# Patient Record
Sex: FEMALE | Race: WHITE | NOT HISPANIC OR LATINO | Employment: PART TIME | URBAN - NONMETROPOLITAN AREA
[De-identification: names, ages, dates, MRNs, and addresses within clinical notes are randomized per-mention and may not be internally consistent; named-entity substitution may affect disease eponyms.]

---

## 2017-02-06 ENCOUNTER — TELEPHONE (OUTPATIENT)
Dept: FAMILY MEDICINE | Facility: CLINIC | Age: 57
End: 2017-02-06

## 2017-02-06 NOTE — TELEPHONE ENCOUNTER
2/6/2017    Call Regarding Preventive Health Screening Colonoscopy, Mammogram, Cervical/PAP and LDL    Attempt 1    Message on voicemail     Comments:         Outreach   Yanet Martin

## 2017-02-13 NOTE — TELEPHONE ENCOUNTER
2/13/2017    Call Regarding Preventive Health Screening Colonoscopy, Mammogram and Cervical/PAP and LDL    Attempt 2    Message on voicemail     Comments:           Outreach   chano

## 2017-02-20 NOTE — TELEPHONE ENCOUNTER
2/20/2017    Call Regarding Preventive Health Screening Colonoscopy, Mammogram and Cervical/PAP    Attempt 3    Message on voicemail     Comments:           Outreach   chano

## 2017-05-05 ENCOUNTER — OFFICE VISIT (OUTPATIENT)
Dept: FAMILY MEDICINE | Facility: CLINIC | Age: 57
End: 2017-05-05
Payer: COMMERCIAL

## 2017-05-05 VITALS — TEMPERATURE: 99.3 F | HEART RATE: 95 BPM | OXYGEN SATURATION: 93 %

## 2017-05-05 DIAGNOSIS — J44.9 CHRONIC OBSTRUCTIVE PULMONARY DISEASE, UNSPECIFIED COPD TYPE (H): Primary | ICD-10-CM

## 2017-05-05 DIAGNOSIS — L03.011 CELLULITIS OF FINGER OF RIGHT HAND: ICD-10-CM

## 2017-05-05 PROCEDURE — 99214 OFFICE O/P EST MOD 30 MIN: CPT | Performed by: NURSE PRACTITIONER

## 2017-05-05 RX ORDER — ALBUTEROL SULFATE 90 UG/1
1-2 AEROSOL, METERED RESPIRATORY (INHALATION) EVERY 4 HOURS PRN
Qty: 2 INHALER | Refills: 3 | Status: SHIPPED | OUTPATIENT
Start: 2017-05-05 | End: 2017-09-27

## 2017-05-05 RX ORDER — CEPHALEXIN 500 MG/1
500 CAPSULE ORAL 2 TIMES DAILY
Qty: 20 CAPSULE | Refills: 0 | Status: SHIPPED | OUTPATIENT
Start: 2017-05-05 | End: 2017-09-27

## 2017-05-05 NOTE — MR AVS SNAPSHOT
After Visit Summary   5/5/2017    Astrid Santoro    MRN: [id number]           Patient Information     Date Of Birth          1960        Visit Information        Provider Department      5/5/2017 2:40 PM Jacque Sherwood APRN [facility]        Today's Diagnoses     Chronic obstructive pulmonary disease, unspecified COPD type (H)    -  1    Cellulitis of finger of right hand          Care Instructions    Complete full course of antibiotics even if you notice improvement in the finger    Soak your finger in warm water twice a day.     No need to put a band aid on unless it opens up and drains    If you develop fever, increased swelling, warmth or redness go to the urgent care of ER.        Your clinic record indicates that you are due for:   Mammogram, Pap and physical exam, Colonoscopy or yearly stool blood testing (FIT) and fasting labs  Need to be fasting for 10 hrs so just coming in before eating breakfast is the easiest.   You are due for your mammogram, please schedule: [place] FallsVnyp=[phone], [place]Jbzv=[phone], [place] Clemente[place]dxetx=[phone], [facility] [phone] or Snicojz=[phone]              Follow-ups after your visit        Who to contact     If you have questions or need follow up information about today's clinic visit or your schedule please contact [facility] directly at [phone].  Normal or non-critical lab and imaging results will be communicated to you by MyChart, letter or phone within 4 business days after the clinic has received the results. If you do not hear from us within 7 days, please contact the clinic through MyChart or phone. If you have a critical or abnormal lab result, we will notify you by phone as soon as possible.  Submit refill requests through [employer] or call your pharmacy and they will forward the refill request to us. Please allow 3 business days for your refill to be completed.          Additional Information  "About Your Visit        [employer] Information     [employer] lets you send messages to your doctor, view your test results, renew your prescriptions, schedule appointments and more. To sign up, go to [email or web address] . Click on \"Log in\" on the left side of the screen, which will take you to the Welcome page. Then click on \"Sign up Now\" on the right side of the page.     You will be asked to enter the access code listed below, as well as some personal information. Please follow the directions to create your username and password.     Your access code is: [email or web address]  Expires: 8/3/2017  3:21 PM     Your access code will  in 90 days. If you need help or a new code, please call your [facility] clinic or [phone].        Care EveryWhere ID     This is your Care EveryWhere ID. This could be used by other organizations to access your [facility] medical records  [id number]        Your Vitals Were     Pulse Temperature Pulse Oximetry             95 99.3  F (37.4  C) (Tympanic) 93%          Blood Pressure from Last 3 Encounters:   16 114/60   12/11/15 108/60   07/10/15 118/60    Weight from Last 3 Encounters:   16 169 lb (76.7 kg)   16 169 lb 4.8 oz (76.8 kg)   12/11/15 173 lb (78.5 kg)              Today, you had the following     No orders found for display         Today's Medication Changes          These changes are accurate as of: 17  3:21 PM.  If you have any questions, ask your nurse or doctor.               Start taking these medicines.        Dose/Directions    cephALEXin 500 MG capsule   Commonly known as:  KEFLEX   Used for:  Cellulitis of finger of right hand        Dose:  500 mg   Take 1 capsule (500 mg) by mouth 2 times daily   Quantity:  20 capsule   Refills:  0            Where to get your medicines      These medications were sent to [facility] [address] [address]     Phone:  [phone]     albuterol 108 (90 " BASE) MCG/ACT Inhaler    cephALEXin 500 MG capsule    mometasone-formoterol 200-5 MCG/ACT oral inhaler    tiotropium 2.5 MCG/ACT inhalation aerosol                Primary Care Provider Office Phone # Fax #    MARISSA Alvarado CHRIS [phone]       [facility] [address] [facility] [address]        Thank you!     Thank you for choosing [facility]  for your care. Our goal is always to provide you with excellent care. Hearing back from our patients is one way we can continue to improve our services. Please take a few minutes to complete the written survey that you may receive in the mail after your visit with us. Thank you!             Your Updated Medication List - Protect others around you: Learn how to safely use, store and throw away your medicines at www.disposemymeds.org.          This list is accurate as of: 5/5/17  3:21 PM.  Always use your most recent med list.                   Brand Name Dispense Instructions for use    albuterol 108 (90 BASE) MCG/ACT Inhaler    PROAIR HFA/PROVENTIL HFA/VENTOLIN HFA    2 Inhaler    Inhale 1-2 puffs into the lungs every 4 hours as needed for shortness of breath / dyspnea       cephALEXin 500 MG capsule    KEFLEX    20 capsule    Take 1 capsule (500 mg) by mouth 2 times daily       IBU-200 PO      as directed as needed       mometasone-formoterol 200-5 MCG/ACT oral inhaler    DULERA    39 g    Inhale 2 puffs into the lungs 2 times daily       tiotropium 2.5 MCG/ACT inhalation aerosol    SPIRIVA RESPIMAT    12 g    Inhale 2 puffs into the lungs daily       TYLENOL EXTRA STRENGTH PO      as directed

## 2017-05-05 NOTE — PATIENT INSTRUCTIONS
Complete full course of antibiotics even if you notice improvement in the finger    Soak your finger in warm water twice a day.     No need to put a band aid on unless it opens up and drains    If you develop fever, increased swelling, warmth or redness go to the urgent care of ER.        Your clinic record indicates that you are due for:  Mammogram, Pap and physical exam, Colonoscopy or yearly stool blood testing (FIT) and fasting labs  Need to be fasting for 10 hrs so just coming in before eating breakfast is the easiest.   You are due for your mammogram, please schedule: [place]Rlsj=[phone], [place] ValleyWavz=[phone], MarionVmmtvf=[phone], [facility] [phone] or Dxzwemx=[phone]

## 2017-05-05 NOTE — PROGRESS NOTES
SUBJECTIVE:                                                    Astrid Santoro is a 56 year old female who presents to clinic today for the following health issues:    COPD Follow-Up    Symptoms are currently: stable    Current fatigue or dyspnea with ambulation: none    Shortness of breath: stable    Increased or change in Cough/Sputum: No    Fever(s): No    Baseline ambulation without stopping to rest 1 miles. Able to walk up 2 flights of stairs without stopping to rest.    Any ER/UC or hospital admissions since your last visit? No     History   Smoking Status     Current Every Day Smoker     Packs/day: 0.50     Years: 36.00     Types: Cigarettes   Smokeless Tobacco     Never Used     Comment: cutting down,      No results found for: FEV1, XWS1QAE  No bronchitis or pneumonia for a few years.  Needs refills of medications       Amount of exercise or physical activity: 4-5 days/week for an average of 15-30 minutes    Problems taking medications regularly: Yes,  Put of medication because didn't have insurance    Medication side effects: none    Diet: regular (no restrictions)        infection      Duration: 1 week    Description (location/character/radiation): tip of right ring finger     Intensity:  moderate    Accompanying signs and symptoms: swelling, redness    History (similar episodes/previous evaluation): amputation of tip of finger 1997    Precipitating or alleviating factors: None    Therapies tried and outcome: 1 week ago had a yellow spot on tip, inserted a needle and pus came out. Soaking and hydrogen peroxide didn't help   Has been present for the past week. No apparent injury.   One week ago fingertip was red, inflamed.  Drained a small amount of purulent drainage after being poked with a needle one week ago.   She has been applying Bacitracin and covering the area.  Appearance has worsened.      Problem list and histories reviewed & adjusted, as indicated.  Additional history: as  documented      Reviewed and updated as needed this visit by clinical staff  Allergies       Reviewed and updated as needed this visit by Provider         ROS:  Constitutional, HEENT, cardiovascular, pulmonary, gi and gu systems are negative, except as otherwise noted.    OBJECTIVE:                                                    Pulse 95  Temp 99.3  F (37.4  C) (Tympanic)  SpO2 93%  There is no height or weight on file to calculate BMI.  GENERAL: healthy, alert and no distress  RESP: normal work of breathing  SKIN: tip of the right ring finger is erythematous, swollen and warm to touch. Dry, closed pinpoint noted at tip of finger. No drainage noted   PSYCH: mentation appears normal, affect normal/bright    Diagnostic Test Results:       ASSESSMENT/PLAN:                                                        1. Chronic obstructive pulmonary disease, unspecified COPD type (H)  Stable.  No change in plan of care.  - albuterol (PROAIR HFA/PROVENTIL HFA/VENTOLIN HFA) 108 (90 BASE) MCG/ACT Inhaler; Inhale 1-2 puffs into the lungs every 4 hours as needed for shortness of breath / dyspnea  Dispense: 2 Inhaler; Refill: 3  - mometasone-formoterol (DULERA) 200-5 MCG/ACT oral inhaler; Inhale 2 puffs into the lungs 2 times daily  Dispense: 39 g; Refill: 6  - tiotropium (SPIRIVA RESPIMAT) 2.5 MCG/ACT inhalation aerosol; Inhale 2 puffs into the lungs daily  Dispense: 12 g; Refill: 3    2. Cellulitis of finger of right hand  Likely worsened after patient used needle to drain.  Instructed to soak finger in  Warm water.  Reviewed with her the reasons that she should return to the ER.  Patient voices understanding.  - cephALEXin (KEFLEX) 500 MG capsule; Take 1 capsule (500 mg) by mouth 2 times daily  Dispense: 20 capsule; Refill: 0    Patient Instructions   Complete full course of antibiotics even if you notice improvement in the finger    Soak your finger in warm water twice a day.     No need to put a band aid on unless it  opens up and drains    If you develop fever, increased swelling, warmth or redness go to the urgent care of ER.        Your clinic record indicates that you are due for:  Mammogram, Pap and physical exam, Colonoscopy or yearly stool blood testing (FIT) and fasting labs  Need to be fasting for 10 hrs so just coming in before eating breakfast is the easiest.   You are due for your mammogram, please schedule: [place]Fcok=[phone], [place]daleVjrr=[phone], [place] RepublicQdaebg=[phone], [facility] [phone] or Hgdcxqv=[phone]            Jacque Sherwood NP, APRN [facility]

## 2017-07-15 ENCOUNTER — HEALTH MAINTENANCE LETTER (OUTPATIENT)
Age: 57
End: 2017-07-15

## 2017-09-27 ENCOUNTER — OFFICE VISIT (OUTPATIENT)
Dept: FAMILY MEDICINE | Facility: CLINIC | Age: 57
End: 2017-09-27
Payer: COMMERCIAL

## 2017-09-27 ENCOUNTER — RESULT FOLLOW UP (OUTPATIENT)
Dept: FAMILY MEDICINE | Facility: CLINIC | Age: 57
End: 2017-09-27

## 2017-09-27 ENCOUNTER — TELEPHONE (OUTPATIENT)
Dept: FAMILY MEDICINE | Facility: CLINIC | Age: 57
End: 2017-09-27

## 2017-09-27 VITALS
RESPIRATION RATE: 14 BRPM | TEMPERATURE: 98.7 F | SYSTOLIC BLOOD PRESSURE: 114 MMHG | WEIGHT: 164 LBS | OXYGEN SATURATION: 99 % | BODY MASS INDEX: 30.18 KG/M2 | DIASTOLIC BLOOD PRESSURE: 82 MMHG | HEIGHT: 62 IN

## 2017-09-27 DIAGNOSIS — Z87.42 HISTORY OF VAGINAL SORES: ICD-10-CM

## 2017-09-27 DIAGNOSIS — Z86.32 H/O GESTATIONAL DIABETES MELLITUS, NOT CURRENTLY PREGNANT: ICD-10-CM

## 2017-09-27 DIAGNOSIS — J44.9 CHRONIC OBSTRUCTIVE PULMONARY DISEASE, UNSPECIFIED COPD TYPE (H): ICD-10-CM

## 2017-09-27 DIAGNOSIS — R04.0 EPISTAXIS: ICD-10-CM

## 2017-09-27 DIAGNOSIS — Z00.00 ROUTINE GENERAL MEDICAL EXAMINATION AT A HEALTH CARE FACILITY: Primary | ICD-10-CM

## 2017-09-27 DIAGNOSIS — M25.511 RIGHT SHOULDER PAIN, UNSPECIFIED CHRONICITY: ICD-10-CM

## 2017-09-27 DIAGNOSIS — N89.8 VAGINAL DRYNESS: ICD-10-CM

## 2017-09-27 DIAGNOSIS — B97.7 HPV IN FEMALE: ICD-10-CM

## 2017-09-27 DIAGNOSIS — R87.810 ASCUS WITH POSITIVE HIGH RISK HPV CERVICAL: ICD-10-CM

## 2017-09-27 DIAGNOSIS — N95.1 SYMPTOMATIC MENOPAUSAL OR FEMALE CLIMACTERIC STATES: ICD-10-CM

## 2017-09-27 DIAGNOSIS — R87.610 ASCUS WITH POSITIVE HIGH RISK HPV CERVICAL: ICD-10-CM

## 2017-09-27 DIAGNOSIS — M25.552 HIP PAIN, LEFT: ICD-10-CM

## 2017-09-27 DIAGNOSIS — Z11.59 NEED FOR HEPATITIS C SCREENING TEST: ICD-10-CM

## 2017-09-27 DIAGNOSIS — F10.20 ALCOHOL DEPENDENCE, DAILY USE (H): ICD-10-CM

## 2017-09-27 DIAGNOSIS — M25.50 MULTIPLE JOINT PAIN: ICD-10-CM

## 2017-09-27 LAB
ERYTHROCYTE [DISTWIDTH] IN BLOOD BY AUTOMATED COUNT: 12.5 % (ref 10–15)
HBA1C MFR BLD: 4.9 % (ref 4.3–6)
HCT VFR BLD AUTO: 40 % (ref 35–47)
HGB BLD-MCNC: 13.9 G/DL (ref 11.7–15.7)
INR PPP: 0.95 (ref 0.86–1.14)
MCH RBC QN AUTO: 34.4 PG (ref 26.5–33)
MCHC RBC AUTO-ENTMCNC: 34.8 G/DL (ref 31.5–36.5)
MCV RBC AUTO: 99 FL (ref 78–100)
PLATELET # BLD AUTO: 210 10E9/L (ref 150–450)
RBC # BLD AUTO: 4.04 10E12/L (ref 3.8–5.2)
WBC # BLD AUTO: 7.5 10E9/L (ref 4–11)

## 2017-09-27 PROCEDURE — 82947 ASSAY GLUCOSE BLOOD QUANT: CPT | Performed by: NURSE PRACTITIONER

## 2017-09-27 PROCEDURE — G0145 SCR C/V CYTO,THINLAYER,RESCR: HCPCS | Performed by: NURSE PRACTITIONER

## 2017-09-27 PROCEDURE — 87529 HSV DNA AMP PROBE: CPT | Performed by: NURSE PRACTITIONER

## 2017-09-27 PROCEDURE — 99396 PREV VISIT EST AGE 40-64: CPT | Performed by: NURSE PRACTITIONER

## 2017-09-27 PROCEDURE — 86431 RHEUMATOID FACTOR QUANT: CPT | Performed by: NURSE PRACTITIONER

## 2017-09-27 PROCEDURE — 80076 HEPATIC FUNCTION PANEL: CPT | Performed by: NURSE PRACTITIONER

## 2017-09-27 PROCEDURE — G0472 HEP C SCREEN HIGH RISK/OTHER: HCPCS | Performed by: NURSE PRACTITIONER

## 2017-09-27 PROCEDURE — 86039 ANTINUCLEAR ANTIBODIES (ANA): CPT | Performed by: NURSE PRACTITIONER

## 2017-09-27 PROCEDURE — 36415 COLL VENOUS BLD VENIPUNCTURE: CPT | Performed by: NURSE PRACTITIONER

## 2017-09-27 PROCEDURE — 84550 ASSAY OF BLOOD/URIC ACID: CPT | Performed by: NURSE PRACTITIONER

## 2017-09-27 PROCEDURE — 87624 HPV HI-RISK TYP POOLED RSLT: CPT | Performed by: NURSE PRACTITIONER

## 2017-09-27 PROCEDURE — 85027 COMPLETE CBC AUTOMATED: CPT | Performed by: NURSE PRACTITIONER

## 2017-09-27 PROCEDURE — 99214 OFFICE O/P EST MOD 30 MIN: CPT | Mod: 25 | Performed by: NURSE PRACTITIONER

## 2017-09-27 PROCEDURE — 80061 LIPID PANEL: CPT | Performed by: NURSE PRACTITIONER

## 2017-09-27 PROCEDURE — 85610 PROTHROMBIN TIME: CPT | Performed by: NURSE PRACTITIONER

## 2017-09-27 PROCEDURE — 86038 ANTINUCLEAR ANTIBODIES: CPT | Performed by: NURSE PRACTITIONER

## 2017-09-27 PROCEDURE — 86200 CCP ANTIBODY: CPT | Performed by: NURSE PRACTITIONER

## 2017-09-27 PROCEDURE — G0476 HPV COMBO ASSAY CA SCREEN: HCPCS | Performed by: NURSE PRACTITIONER

## 2017-09-27 PROCEDURE — 83036 HEMOGLOBIN GLYCOSYLATED A1C: CPT | Performed by: NURSE PRACTITIONER

## 2017-09-27 RX ORDER — FLUTICASONE PROPIONATE AND SALMETEROL 113; 14 UG/1; UG/1
1 POWDER, METERED RESPIRATORY (INHALATION) 2 TIMES DAILY
Qty: 3 EACH | Refills: 3 | Status: SHIPPED | OUTPATIENT
Start: 2017-09-27 | End: 2020-06-01

## 2017-09-27 RX ORDER — MELOXICAM 15 MG/1
15 TABLET ORAL DAILY
Qty: 90 TABLET | Refills: 1 | Status: SHIPPED | OUTPATIENT
Start: 2017-09-27 | End: 2020-06-01

## 2017-09-27 RX ORDER — ALBUTEROL SULFATE 90 UG/1
2 AEROSOL, METERED RESPIRATORY (INHALATION) EVERY 6 HOURS
Qty: 3 INHALER | Refills: 3 | Status: SHIPPED | OUTPATIENT
Start: 2017-09-27 | End: 2020-12-15

## 2017-09-27 NOTE — MR AVS SNAPSHOT
After Visit Summary   9/27/2017    Astrid Santoro    MRN: [id number]           Patient Information     Date Of Birth          1960        Visit Information        Provider Department      9/27/2017 3:00 PM Nohelia Darby APRN [facility]        Today's Diagnoses     Chronic obstructive pulmonary disease, unspecified COPD type (H)    -  1    H/O gestational diabetes mellitus, not currently pregnant        HPV in female        Need for hepatitis C screening test        Routine general medical examination at a health care facility        Multiple joint pain        Epistaxis        Hip pain, left        Right shoulder pain, unspecified chronicity        Symptomatic menopausal or female climacteric states        Vaginal dryness        History of vaginal sores          Care Instructions      Preventive Health Recommendations  Female Ages 50 - 64    Yearly exam: See your health care provider every year in order to  o Review health changes.   o Discuss preventive care.    o Review your medicines if your doctor has prescribed any.      Get a Pap test every three years (unless you have an abnormal result and your provider advises testing more often).    If you get Pap tests with HPV test, you only need to test every 5 years, unless you have an abnormal result.     You do not need a Pap test if your uterus was removed (hysterectomy) and you have not had cancer.    You should be tested each year for STDs (sexually transmitted diseases) if you're at risk.     Have a mammogram every 1 to 2 years.    Have a colonoscopy at age 50, or have a yearly FIT test (stool test). These exams screen for colon cancer.      Have a cholesterol test every 5 years, or more often if advised.    Have a diabetes test (fasting glucose) every three years. If you are at risk for diabetes, you should have this test more often.     If you are at risk for osteoporosis (brittle bone disease), think about having  a bone density scan (DEXA).    Shots: Get a flu shot each year. Get a tetanus shot every 10 years.    Nutrition:     Eat at least 5 servings of fruits and vegetables each day.    Eat whole-grain bread, whole-wheat pasta and brown rice instead of white grains and rice.    Talk to your provider about Calcium and Vitamin D.     Lifestyle    Exercise at least 150 minutes a week (30 minutes a day, 5 days a week). This will help you control your weight and prevent disease.    Limit alcohol to one drink per day.    No smoking.     Wear sunscreen to prevent skin cancer.     See your dentist every six months for an exam and cleaning.    See your eye doctor every 1 to 2 years.                Follow-ups after your visit        Future tests that were ordered for you today     Open Future Orders        Priority Expected Expires Ordered    Fecal colorectal cancer screen FIT Routine 10/18/2017 12/20/2017 9/27/2017    **Lyme Disease Irina with reflex to WB Serum FUTURE 14d Routine 10/4/2017 10/11/2017 9/27/2017            Who to contact     If you have questions or need follow up information about today's clinic visit or your schedule please contact [facility] directly at [phone].  Normal or non-critical lab and imaging results will be communicated to you by MyChart, letter or phone within 4 business days after the clinic has received the results. If you do not hear from us within 7 days, please contact the clinic through [employer] or phone. If you have a critical or abnormal lab result, we will notify you by phone as soon as possible.  Submit refill requests through [employer] or call your pharmacy and they will forward the refill request to us. Please allow 3 business days for your refill to be completed.          Additional Information About Your Visit        [employer] Information     [employer] lets you send messages to your doctor, view your test results, renew your prescriptions, schedule appointments and more. To  "sign up, go to [email or web address] . Click on \"Log in\" on the left side of the screen, which will take you to the Welcome page. Then click on \"Sign up Now\" on the right side of the page.     You will be asked to enter the access code listed below, as well as some personal information. Please follow the directions to create your username and password.     Your access code is: [email or web address]  Expires: 2017  3:51 PM     Your access code will  in 90 days. If you need help or a new code, please call your [place] clinic or [phone].        Care EveryWhere ID     This is your Care EveryWhere ID. This could be used by other organizations to access your [facility] medical records  [id number]        Your Vitals Were     Temperature Respirations Height Pulse Oximetry BMI (Body Mass Index)       98.7  F (37.1  C) (Tympanic) 14 5' 2.25\" (1.581 m) 99% 29.76 kg/m2        Blood Pressure from Last 3 Encounters:   17 114/82   16 114/60   12/11/15 108/60    Weight from Last 3 Encounters:   17 164 lb (74.4 kg)   16 169 lb (76.7 kg)   16 169 lb 4.8 oz (76.8 kg)              We Performed the Following     Anti Nuclear Irina IgG by IFA with Reflex     CBC with platelets     Cyclic Citrullinated Peptide Antibody IgG     GLUCOSE     Hemoglobin A1c     Hepatic panel (Albumin, ALT, AST, Bili, Alk Phos, TP)     Hepatitis C antibody     HPV High Risk Types DNA Cervical     HSV 1 and 2 DNA by PCR     INR     Lipid panel reflex to direct LDL     Pap imaged thin layer screen with HPV - recommended age 30 - 65     Rheumatoid factor     Uric acid          Today's Medication Changes          These changes are accurate as of: 17  3:59 PM.  If you have any questions, ask your nurse or doctor.               Start taking these medicines.        Dose/Directions    estrogen (conjugated)-medroxyPROGESTERone 0.625-2.5 MG per tablet   Commonly known as:  PREMPRO   Used for:  Symptomatic menopausal or female " climacteric states   Started by:  Nohelia Darby APRN CNP        Dose:  1 tablet   Take 1 tablet by mouth daily   Quantity:  90 tablet   Refills:  3       Fluticasone-Salmeterol 113-14 MCG/ACT Aepb   Used for:  Chronic obstructive pulmonary disease, unspecified COPD type (H)   Started by:  Nohelia Darby APRN CNP        Dose:  1 puff   Inhale 1 puff into the lungs 2 times daily   Quantity:  3 each   Refills:  3       meloxicam 15 MG tablet   Commonly known as:  MOBIC   Used for:  Multiple joint pain   Started by:  Nohelia Darby APRN CNP        Dose:  15 mg   Take 1 tablet (15 mg) by mouth daily   Quantity:  90 tablet   Refills:  1         These medicines have changed or have updated prescriptions.        Dose/Directions    albuterol 108 (90 BASE) MCG/ACT Inhaler   Commonly known as:  VENTOLIN HFA   This may have changed:    - how much to take  - when to take this  - reasons to take this   Used for:  Chronic obstructive pulmonary disease, unspecified COPD type (H)   Changed by:  Nohelia Darby APRN CNP        Dose:  2 puff   Inhale 2 puffs into the lungs every 6 hours   Quantity:  3 Inhaler   Refills:  3         Stop taking these medicines if you haven't already. Please contact your care team if you have questions.     cephALEXin 500 MG capsule   Commonly known as:  KEFLEX   Stopped by:  Nohelia Darby APRN CNP           mometasone-formoterol 200-5 MCG/ACT oral inhaler   Commonly known as:  DULERA   Stopped by:  Nohelia Darby APRN CNP                Where to get your medicines      These medications were sent to [facility] [address] [address]     Phone:  [phone]     albuterol 108 (90 BASE) MCG/ACT Inhaler    estrogen (conjugated)-medroxyPROGESTERone 0.625-2.5 MG per tablet    Fluticasone-Salmeterol 113-14 MCG/ACT Aepb    meloxicam 15 MG tablet    tiotropium 2.5 MCG/ACT inhalation aerosol                 Primary Care Provider Office Phone # Fax #    MARISSA Alvarado [facility] [phone]       [address] [facility] [address]        Equal Access to Services     ANJALI GIBBS : Hadii aad ku hadfer Puckett, waaxda luqadaha, qaybta kaalmada ruddy, sharri martin tyrelcurtis lepereyna porras. So [facility] [phone].    ATENCIÓN: Si habla español, tiene a pat disposición servicios gratuitos de asistencia lingüística. Llame al [phone].    We comply with applicable federal civil rights laws and [place] laws. We do not discriminate on the basis of race, color, national origin, age, disability sex, sexual orientation or gender identity.            Thank you!     Thank you for choosing [facility]  for your care. Our goal is always to provide you with excellent care. Hearing back from our patients is one way we can continue to improve our services. Please take a few minutes to complete the written survey that you may receive in the mail after your visit with us. Thank you!             Your Updated Medication List - Protect others around you: Learn how to safely use, store and throw away your medicines at www.disposemymeds.org.          This list is accurate as of: 9/27/17  3:59 PM.  Always use your most recent med list.                   Brand Name Dispense Instructions for use Diagnosis    albuterol 108 (90 BASE) MCG/ACT Inhaler    VENTOLIN HFA    3 Inhaler    Inhale 2 puffs into the lungs every 6 hours    Chronic obstructive pulmonary disease, unspecified COPD type (H)       estrogen (conjugated)-medroxyPROGESTERone 0.625-2.5 MG per tablet    PREMPRO    90 tablet    Take 1 tablet by mouth daily    Symptomatic menopausal or female climacteric states       Fluticasone-Salmeterol 113-14 MCG/ACT Aepb     3 each    Inhale 1 puff into the lungs 2 times daily    Chronic obstructive pulmonary disease, unspecified COPD type (H)       IBU-200 PO      as directed as needed         meloxicam 15 MG tablet    MOBIC    90 tablet    Take 1 tablet (15 mg) by mouth daily    Multiple joint pain       tiotropium 2.5 MCG/ACT inhalation aerosol    SPIRIVA RESPIMAT    12 g    Inhale 2 puffs into the lungs daily    Chronic obstructive pulmonary disease, unspecified COPD type (H)       TYLENOL EXTRA STRENGTH PO      as directed

## 2017-09-27 NOTE — PROGRESS NOTES
SUBJECTIVE:   CC: Astrid Santoro is an 57 year old woman who presents for preventive health visit.     Healthy Habits:    Do you get at least three servings of calcium containing foods daily (dairy, green leafy vegetables, etc.)? yes    Amount of exercise or daily activities, outside of work: active at work     Problems taking medications regularly No    Medication side effects: No    Have you had an eye exam in the past two years? no    Do you see a dentist twice per year? yes    Do you have sleep apnea, excessive snoring or daytime drowsiness?no          Joint pain       Duration: 2 years     Description (location/character/radiation): joint pain and fatigue     Intensity:  moderate    Accompanying signs and symptoms: none    History (similar episodes/previous evaluation): None  Facial rash. History of COPD, intermittent pleuritic chest pain      Menopause for 3 years.  COPD for 6 years  Smoker since age 14.. < 1ppd for   ETOH use 3/4 beers a day. 2 shots before bed. Daily use for 10 years.   Legal issues in the past DWI x 1 in the past.   No withdrawal in the past. No black outs or passed out.   Denies any concerns about her drinking.          ...  Tubal ligation in the past.   Ovaries and uterus remains.  Mammogram never.   No family history of breast or ovarian cancer.   Hormones ORAL BIRTH CONTROL for 2 years.   IUD painful removal.   Intermittent vaginal burning and soreness     Lab Results   Component Value Date    PAP NIL 2009    PAP NIL 2007    PAP NIL 2005     HPV in the past.     Today's PHQ-2 Score: PHQ-2 (  [employer]) 2016   Q1: Little interest or pleasure in doing things 0 0   Q2: Feeling down, depressed or hopeless 0 0   PHQ-2 Score 0 0         Abuse: Current or Past(Physical, Sexual or Emotional)- No  Do you feel safe in your environment - Yes  Social History   Substance Use Topics     Smoking status: Current Every Day Smoker     Packs/day:  "0.50     Years: 36.00     Types: Cigarettes     Smokeless tobacco: Never Used      Comment: cutting down,      Alcohol use Yes      Comment: 1-2 drinks per day ( 2 bottles)     The patient does not drink >3 drinks per day nor >7 drinks per week.    Reviewed orders with patient.  Reviewed health maintenance and updated orders accordingly - Yes  BP Readings from Last 3 Encounters:   09/27/17 114/82   01/22/16 114/60   12/11/15 108/60    Wt Readings from Last 3 Encounters:   09/27/17 164 lb (74.4 kg)   01/26/16 169 lb (76.7 kg)   01/22/16 169 lb 4.8 oz (76.8 kg)             Patient over age 50, mutual decision to screen reflected in health maintenance.      Pertinent mammograms are reviewed under the imaging tab.  History of abnormal Pap smear:   Last 3 Pap Results:   PAP (no units)   Date Value   08/21/2009 NIL   11/19/2007 NIL   07/28/2005 NIL       Reviewed and updated as needed this visit by clinical staffTobacco  Allergies         Reviewed and updated as needed this visit by Provider            ROS:   ROS: 10 point ROS neg other than the symptoms noted above in the HPI.      OBJECTIVE:   /82  Temp 98.7  F (37.1  C) (Tympanic)  Resp 14  Ht 5' 2.25\" (1.581 m)  Wt 164 lb (74.4 kg)  SpO2 99%  BMI 29.76 kg/m2  EXAM:  GENERAL APPEARANCE: healthy, alert and no distress  EYES: Eyes grossly normal to inspection, PERRL and conjunctivae and sclerae normal  HENT: ear canals and TM's normal, nose and mouth without ulcers or lesions, oropharynx clear and oral mucous membranes moist  NECK: no adenopathy, no asymmetry, masses, or scars and thyroid normal to palpation  RESP: lungs clear to auscultation - no rales, rhonchi or wheezes and decreased breath sounds bilateral and throughout  BREAST: normal without masses, tenderness or nipple discharge and no palpable axillary masses or adenopathy  CV: regular rate and rhythm, normal S1 S2, no S3 or S4, no murmur, click or rub, no peripheral edema and peripheral pulses " strong  ABDOMEN: soft, nontender, no hepatosplenomegaly, no masses and bowel sounds normal   (female): normal female external genitalia, normal urethral meatus, vaginal mucosal atrophy noted, normal cervix, adnexae, and uterus without masses or abnormal discharge  MS: no musculoskeletal defects are noted and gait is age appropriate without ataxia patient has diffuse joint tenderness with palpation. Positive squeeze test hands bilateral  SKIN: no suspicious lesions red rash over cheeks and nose  NEURO: Normal strength and tone, sensory exam grossly normal, mentation intact and speech normal  PSYCH: mentation appears normal and affect normal/bright    ASSESSMENT/PLAN:   Astrid was seen today for physical and arthritis.    Diagnoses and all orders for this visit:    Chronic obstructive pulmonary disease, unspecified COPD type (H)  -     Fluticasone-Salmeterol 113-14 MCG/ACT AEPB; Inhale 1 puff into the lungs 2 times daily  -     albuterol (VENTOLIN HFA) 108 (90 BASE) MCG/ACT Inhaler; Inhale 2 puffs into the lungs every 6 hours  -     tiotropium (SPIRIVA RESPIMAT) 2.5 MCG/ACT inhalation aerosol; Inhale 2 puffs into the lungs daily    H/O gestational diabetes mellitus, not currently pregnant  -     GLUCOSE  -     Lipid panel reflex to direct LDL  -     Hemoglobin A1c    HPV in female  -     HPV High Risk Types DNA Cervical    Need for hepatitis C screening test  -     Hepatitis C antibody    Routine general medical examination at a health care facility  -     Pap imaged thin layer screen with HPV - recommended age 30 - 65  -     Fecal colorectal cancer screen FIT; Future    Multiple joint pain  -     Cyclic Citrullinated Peptide Antibody IgG  -     Uric acid  -     Rheumatoid factor  -     Anti Nuclear Irina IgG by IFA with Reflex  -     **Lyme Disease Irina with reflex to WB Serum FUTURE 14d; Future  -     meloxicam (MOBIC) 15 MG tablet; Take 1 tablet (15 mg) by mouth daily    Epistaxis  -     CBC with platelets  -      "INR  -     Hepatic panel (Albumin, ALT, AST, Bili, Alk Phos, TP)    Hip pain, left    Right shoulder pain, unspecified chronicity    Symptomatic menopausal or female climacteric states  -     estrogen, conjugated,-medroxyPROGESTERone (PREMPRO) 0.625-2.5 MG per tablet; Take 1 tablet by mouth daily    Vaginal dryness  -     HSV 1 and 2 DNA by PCR    History of vaginal sores  -     HSV 1 and 2 DNA by PCR    Alcohol dependence, daily use (H)      Daily alcohol use discussed. Patient does not see any problem with this. Discussed health effects.  Symptomatic menopause. Begin hormone replacement therapy  Labs today to look for any inflammatory arthropathy ? LUPUS . Begin meloxicam 15 mg daily for discomfort  Consider imaging of the left hip and right shoulder with ongoing pain  STD testing done today per patient request  Current testing/colonoscopy recommended. Colonoscopy declined. It test cards given.  Screening mammogram recommended    COUNSELING:   Reviewed preventive health counseling, as reflected in patient instructions         reports that she has been smoking Cigarettes.  She has a 18.00 pack-year smoking history. She has never used smokeless tobacco.  Tobacco Cessation Action Plan: Information offered: Patient not interested at this time  Estimated body mass index is 29.76 kg/(m^2) as calculated from the following:    Height as of this encounter: 5' 2.25\" (1.581 m).    Weight as of this encounter: 164 lb (74.4 kg).   Weight management plan: Discussed healthy diet and exercise guidelines and patient will follow up in 6 months in clinic to re-evaluate.    Counseling Resources:  ATP IV Guidelines  Pooled Cohorts Equation Calculator  Breast Cancer Risk Calculator  FRAX Risk Assessment  ICSI Preventive Guidelines  Dietary Guidelines for Americans, 2010  USDA's MyPlate  ASA Prophylaxis  Lung CA Screening  Call or return to the clinic with any worsening of symptoms or no resolution. Patient/Parent verbalized " understanding and is in agreement. Medication side effects reviewed.   Current Outpatient Prescriptions   Medication Sig Dispense Refill     Fluticasone-Salmeterol 113-14 MCG/ACT AEPB Inhale 1 puff into the lungs 2 times daily 3 each 3     albuterol (VENTOLIN HFA) 108 (90 BASE) MCG/ACT Inhaler Inhale 2 puffs into the lungs every 6 hours 3 Inhaler 3     tiotropium (SPIRIVA RESPIMAT) 2.5 MCG/ACT inhalation aerosol Inhale 2 puffs into the lungs daily 12 g 3     meloxicam (MOBIC) 15 MG tablet Take 1 tablet (15 mg) by mouth daily 90 tablet 1     estrogen, conjugated,-medroxyPROGESTERone (PREMPRO) 0.625-2.5 MG per tablet Take 1 tablet by mouth daily 90 tablet 3     IBU-200 PO as directed as needed       TYLENOL EXTRA STRENGTH OR as directed           MARISSA Correa [facility]

## 2017-09-27 NOTE — LETTER
[facility]  [address] [facility] [address]  Phone: [phone]    October 3, 2017        Astrid Santoro  [address] [address]          Dear Astrid,      Normal red and white blood cell counts   Normal blood sugar. No diabetes.   Normal liver function   Normal platelets and bleeding time.   Negative hepatitis C   No gout, no rheumatoid arthritis.   Cholesterol mildly elevated.   Low fat diet and daily exercise for 30-60 minutes most likely will lower this.   Repeat cholesterol fasting again in 1 year to monitor.     Labs for EUNICE show evidence of LUPUS. SLE.     Would recommend consultation with Rheumatology to discuss other evaluation and treatment needed to confirm this.     Referral has been placed.   Call at your convenience to schedule this appointment. Dr Raza   [facility] [place] [facility] [phone]   [email or web address]   G: [place] [facility] [place] [phone]   [email or web address]   Call or return to the clinic with any worsening of symptoms or no resolution.     Component      Latest Ref Rng & Units 9/27/2017   WBC      4.0 - 11.0 10e9/L 7.5   RBC Count      3.8 - 5.2 10e12/L 4.04   Hemoglobin      11.7 - 15.7 g/dL 13.9   Hematocrit      35.0 - 47.0 % 40.0   MCV      78 - 100 fl 99   MCH      26.5 - 33.0 pg 34.4 (H)   MCHC      31.5 - 36.5 g/dL 34.8   RDW      10.0 - 15.0 % 12.5   Platelet Count      150 - 450 10e9/L 210   Bilirubin Direct      0.0 - 0.2 mg/dL <0.1   Bilirubin Total      0.2 - 1.3 mg/dL 0.5   Albumin      3.4 - 5.0 g/dL 4.4   Protein Total      6.8 - 8.8 g/dL 7.8   Alkaline Phosphatase      40 - 150 U/L 47   ALT      0 - 50 U/L 29   AST      0 - 45 U/L 26   Cholesterol      <200 mg/dL 213 (H)   Triglycerides      <150 mg/dL 56   HDL Cholesterol      >49 mg/dL 84   LDL Cholesterol Calculated      <100 mg/dL 118 (H)   Non HDL Cholesterol      <130 mg/dL 129   EUNICE interpretation       NEG:Negative Positive (A)   EUNICE pattern 1       NUCLEOLAR   EUNICE titer 1       >1280   EUNICE pattern 2       SPECKLED   EUNICE titer 2       1:1280   HSV Specimen Type       Blood   HSV Type 1 PCR      NEG:Negative Canceled, Test credited (A)   HSV Type 2 PCR      NEG:Negative Canceled, Test credited (A)   Glucose      70 - 99 mg/dL 85   Hemoglobin A1C      4.3 - 6.0 % 4.9   Hepatitis C Antibody      NR:Nonreactive Nonreactive   Cyclic Citrullinated Peptide Antibody, IgG      <7 U/mL 1   Uric Acid      2.6 - 6.0 mg/dL 5.4   Rheumatoid Factor      <20 IU/mL <20   INR      0.86 - 1.14 0.95     Sincerely,        Nhoelia Darby FNP-BC / sb

## 2017-09-27 NOTE — NURSING NOTE
"Chief Complaint   Patient presents with     Physical     Arthritis     joint pain and fatigue for 2 years        Initial /82  Temp 98.7  F (37.1  C) (Tympanic)  Resp 14  Ht 5' 2.25\" (1.581 m)  Wt 164 lb (74.4 kg)  SpO2 99%  BMI 29.76 kg/m2 Estimated body mass index is 29.76 kg/(m^2) as calculated from the following:    Height as of this encounter: 5' 2.25\" (1.581 m).    Weight as of this encounter: 164 lb (74.4 kg).  Medication Reconciliation: complete    Health Maintenance that is potentially due pending provider review:  Mammogram, Pap Smear and Colonoscopy/FIT    n/a    Is there anyone who you would like to be able to receive your results? No  If yes have patient fill out DINAH    "

## 2017-09-27 NOTE — LETTER
October 24, 2018      Astrid Santoro  [address] [address]    Dear MsAnnette,      At [facility], your health and wellness is our primary concern. That is why we are following up on a colposcopy from 10/30/17. Your provider had recommended that you have a Pap smear and HPV test completed by 10/30/18. Our records do not show that this has been scheduled.    It is important to complete the follow up that your provider has suggested for you to ensure that there are no worsening changes which may, over time, develop into cancer.      Please contact our office at  [phone] to schedule an appointment for a Pap smear and HPV test at your earliest convenience. If you have questions or concerns, please call the clinic and we will be happy to assist you.    If you have completed the tests outside of [facility], please have the results forwarded to our office. We will update the chart for your primary Physician to review before your next annual physical.     Thank you for choosing [facility]!    Sincerely,      Nohelia Darby, MARISSA CNP/es

## 2017-09-27 NOTE — PATIENT INSTRUCTIONS

## 2017-09-27 NOTE — LETTER
December 7, 2018        Astrid Santoro  [address] [address]    Dear ,      Hi,  Just wanted to send you a reminder note that you are due for your pap to be repeated.   We are in [place] now. Please call and set up a time to do this soon.   Please call with any questions or concerns.       Take Care !        Nohelia Darby MSN,[facility]-[address] [address] [phone]

## 2017-09-28 LAB
ALBUMIN SERPL-MCNC: 4.4 G/DL (ref 3.4–5)
ALP SERPL-CCNC: 47 U/L (ref 40–150)
ALT SERPL W P-5'-P-CCNC: 29 U/L (ref 0–50)
AST SERPL W P-5'-P-CCNC: 26 U/L (ref 0–45)
BILIRUB DIRECT SERPL-MCNC: <0.1 MG/DL (ref 0–0.2)
BILIRUB SERPL-MCNC: 0.5 MG/DL (ref 0.2–1.3)
CHOLEST SERPL-MCNC: 213 MG/DL
GLUCOSE SERPL-MCNC: 85 MG/DL (ref 70–99)
HCV AB SERPL QL IA: NONREACTIVE
HDLC SERPL-MCNC: 84 MG/DL
LDLC SERPL CALC-MCNC: 118 MG/DL
NONHDLC SERPL-MCNC: 129 MG/DL
PROT SERPL-MCNC: 7.8 G/DL (ref 6.8–8.8)
RHEUMATOID FACT SER NEPH-ACNC: <20 IU/ML (ref 0–20)
TRIGL SERPL-MCNC: 56 MG/DL
URATE SERPL-MCNC: 5.4 MG/DL (ref 2.6–6)

## 2017-09-29 LAB
ANA PAT SER IF-IMP: ABNORMAL
ANA PAT SER IF-IMP: ABNORMAL
ANA SER QL IF: POSITIVE
ANA TITR SER IF: >1280 {TITER}
ANA TITR SER IF: ABNORMAL {TITER}
CCP AB SER IA-ACNC: 1 U/ML
HSV1 DNA SPEC QL NAA+PROBE: ABNORMAL
HSV2 DNA SPEC QL NAA+PROBE: ABNORMAL
SPECIMEN SOURCE: ABNORMAL

## 2017-10-02 DIAGNOSIS — M79.10 MYALGIA: ICD-10-CM

## 2017-10-02 DIAGNOSIS — R76.8 POSITIVE ANA (ANTINUCLEAR ANTIBODY): Primary | ICD-10-CM

## 2017-10-02 DIAGNOSIS — J43.8 OTHER EMPHYSEMA (H): ICD-10-CM

## 2017-10-02 DIAGNOSIS — R21 FACIAL RASH: ICD-10-CM

## 2017-10-02 LAB
COPATH REPORT: ABNORMAL
PAP: ABNORMAL

## 2017-10-03 LAB
FINAL DIAGNOSIS: ABNORMAL
HPV HR 12 DNA CVX QL NAA+PROBE: POSITIVE
HPV16 DNA SPEC QL NAA+PROBE: NEGATIVE
HPV18 DNA SPEC QL NAA+PROBE: NEGATIVE
SPECIMEN DESCRIPTION: ABNORMAL

## 2017-10-04 PROBLEM — R87.610 ASCUS WITH POSITIVE HIGH RISK HPV CERVICAL: Status: ACTIVE | Noted: 2017-10-04

## 2017-10-04 NOTE — PROGRESS NOTES
"9/27/2017 Pap: ASCUS, +HR HPV \"other\" (neg 16/18). Plan [place]-  10/4/17 Pt notified via phone. Patient will call the [facility] to schedule a colp with Dr. Marroquin.   10/30/17 [place] Bx & ECC - negative. Plan cotest in 1 year.   11/10/17 Clinic staff notified pt of result.   10/24/18 Co-test reminder letter sent. ([facility])  11/14/18 Reminder call to pt, pt will call to schedule ([facility])  12/6/18 Lost to follow-up for pap tracking    "

## 2017-10-05 PROCEDURE — 82274 ASSAY TEST FOR BLOOD FECAL: CPT | Performed by: NURSE PRACTITIONER

## 2017-10-08 LAB — HEMOCCULT STL QL IA: NEGATIVE

## 2017-10-09 ENCOUNTER — RADIANT APPOINTMENT (OUTPATIENT)
Dept: MAMMOGRAPHY | Facility: CLINIC | Age: 57
End: 2017-10-09
Attending: NURSE PRACTITIONER
Payer: COMMERCIAL

## 2017-10-09 DIAGNOSIS — Z12.31 VISIT FOR SCREENING MAMMOGRAM: ICD-10-CM

## 2017-10-09 DIAGNOSIS — Z00.00 ROUTINE GENERAL MEDICAL EXAMINATION AT A HEALTH CARE FACILITY: ICD-10-CM

## 2017-10-09 PROCEDURE — G0202 SCR MAMMO BI INCL CAD: HCPCS | Mod: TC

## 2017-10-20 ENCOUNTER — TELEPHONE (OUTPATIENT)
Dept: FAMILY MEDICINE | Facility: CLINIC | Age: 57
End: 2017-10-20

## 2017-10-30 ENCOUNTER — OFFICE VISIT (OUTPATIENT)
Dept: FAMILY MEDICINE | Facility: CLINIC | Age: 57
End: 2017-10-30
Payer: COMMERCIAL

## 2017-10-30 VITALS
BODY MASS INDEX: 29.28 KG/M2 | RESPIRATION RATE: 20 BRPM | WEIGHT: 161.4 LBS | TEMPERATURE: 97.7 F | DIASTOLIC BLOOD PRESSURE: 58 MMHG | SYSTOLIC BLOOD PRESSURE: 114 MMHG | HEART RATE: 84 BPM

## 2017-10-30 DIAGNOSIS — R87.810 CERVICAL HIGH RISK HPV (HUMAN PAPILLOMAVIRUS) TEST POSITIVE: Primary | ICD-10-CM

## 2017-10-30 PROCEDURE — 57452 EXAM OF CERVIX W/SCOPE: CPT | Performed by: FAMILY MEDICINE

## 2017-10-30 PROCEDURE — 88305 TISSUE EXAM BY PATHOLOGIST: CPT | Performed by: FAMILY MEDICINE

## 2017-10-30 PROCEDURE — 88305 TISSUE EXAM BY PATHOLOGIST: CPT | Mod: 26 | Performed by: FAMILY MEDICINE

## 2017-10-30 NOTE — NURSING NOTE
"Chief Complaint   Patient presents with     Colposcopy       Initial /58 (BP Location: Right arm, Cuff Size: Adult Regular)  Pulse 84  Temp 97.7  F (36.5  C) (Tympanic)  Resp 20  Wt 161 lb 6.4 oz (73.2 kg)  BMI 29.28 kg/m2 Estimated body mass index is 29.28 kg/(m^2) as calculated from the following:    Height as of 9/27/17: 5' 2.25\" (1.581 m).    Weight as of this encounter: 161 lb 6.4 oz (73.2 kg).  Medication Reconciliation: complete    Health Maintenance that is potentially due pending provider review:  Colonoscopy/FIT    Patient just sent in FIT test.    Is there anyone who you would like to be able to receive your results? Not Applicable  If yes have patient fill out DINAH Padron M.A.        "

## 2017-10-30 NOTE — MR AVS SNAPSHOT
"              After Visit Summary   10/30/2017    Astrid Santoro    MRN: [id number]           Patient Information     Date Of Birth          1960        Visit Information        Provider Department      10/30/2017 4:20 PM My Santa MD [facility]        Care Instructions    Bleeding may occur for a few hours afterwards.  Avoid intercourse for approximately 1 week to allow biopsy site(s) to occur.   If you experience abnormal bleeding or foul discharge, please return to the clinic.  Results will be sent via MyChart or letter if normal. I will call if there are any concerning results.             Follow-ups after your visit        Your next 10 appointments already scheduled     Dec 13, 2017  3:00 PM CST   New Visit with Mook Raza MD   [facility] ([facility]    [address]  [place] [address] [phone]              Who to contact     If you have questions or need follow up information about today's clinic visit or your schedule please contact [facility] directly at [phone].  Normal or non-critical lab and imaging results will be communicated to you by MyChart, letter or phone within 4 business days after the clinic has received the results. If you do not hear from us within 7 days, please contact the clinic through MyChart or phone. If you have a critical or abnormal lab result, we will notify you by phone as soon as possible.  Submit refill requests through [employer] or call your pharmacy and they will forward the refill request to us. Please allow 3 business days for your refill to be completed.          Additional Information About Your Visit        MyChart Information     [employer] lets you send messages to your doctor, view your test results, renew your prescriptions, schedule appointments and more. To sign up, go to [email or web address] . Click on \"Log in\" on the left side of the screen, which will take you to " "the Welcome page. Then click on \"Sign up Now\" on the right side of the page.     You will be asked to enter the access code listed below, as well as some personal information. Please follow the directions to create your username and password.     Your access code is: [email or web address]  Expires: 2017  3:51 PM     Your access code will  in 90 days. If you need help or a new code, please call your [place] clinic or [phone].        Care EveryWhere ID     This is your Care EveryWhere ID. This could be used by other organizations to access your [place] medical records  [id number]        Your Vitals Were     Pulse Temperature Respirations BMI (Body Mass Index)          84 97.7  F (36.5  C) (Tympanic) 20 29.28 kg/m2         Blood Pressure from Last 3 Encounters:   10/30/17 114/58   17 114/82   16 114/60    Weight from Last 3 Encounters:   10/30/17 73.2 kg (161 lb 6.4 oz)   17 74.4 kg (164 lb)   16 76.7 kg (169 lb)              Today, you had the following     No orders found for display       Primary Care Provider Office Phone # Fax #    Jacque Sawyer MARISSA [facility] [phone]       [address] [place] [address]        Equal Access to Services     ANJALI GIBBS : Hadii azalia castellanoso Sojuan, waaxda luqadaha, qaybta kaalmada ruddy, sharri chery . So [facility] [phone].    ATENCIÓN: Si habla español, tiene a pat disposición servicios gratuitos de asistencia lingüística. Nuria al [phone].    We comply with applicable federal civil rights laws and [place] laws. We do not discriminate on the basis of race, color, national origin, age, disability, sex, sexual orientation, or gender identity.            Thank you!     Thank you for choosing [facility]  for your care. Our goal is always to provide you with excellent care. Hearing back from our patients is one way we can continue to improve our services. Please take a few " minutes to complete the written survey that you may receive in the mail after your visit with us. Thank you!             Your Updated Medication List - Protect others around you: Learn how to safely use, store and throw away your medicines at www.disposemymeds.org.          This list is accurate as of: 10/30/17  5:15 PM.  Always use your most recent med list.                   Brand Name Dispense Instructions for use Diagnosis    albuterol 108 (90 BASE) MCG/ACT Inhaler    VENTOLIN HFA    3 Inhaler    Inhale 2 puffs into the lungs every 6 hours    Chronic obstructive pulmonary disease, unspecified COPD type (H)       estrogen (conjugated)-medroxyPROGESTERone 0.625-2.5 MG per tablet    PREMPRO    90 tablet    Take 1 tablet by mouth daily    Symptomatic menopausal or female climacteric states       Fluticasone-Salmeterol 113-14 MCG/ACT Aepb     3 each    Inhale 1 puff into the lungs 2 times daily    Chronic obstructive pulmonary disease, unspecified COPD type (H)       IBU-200 PO      as directed as needed        meloxicam 15 MG tablet    MOBIC    90 tablet    Take 1 tablet (15 mg) by mouth daily    Multiple joint pain       tiotropium 2.5 MCG/ACT inhalation aerosol    SPIRIVA RESPIMAT    12 g    Inhale 2 puffs into the lungs daily    Chronic obstructive pulmonary disease, unspecified COPD type (H)       TYLENOL EXTRA STRENGTH PO      as directed

## 2017-10-30 NOTE — PROGRESS NOTES
SUBJECTIVE:  Astrid Santoro is a 58 y/o female referred by Nohelia Darby for a colposcopy following a POSITIVE HPV on 09/27/17.Patient self reports positive HPV test in the past, but this is not in our system. Patient had tubal ligation previously, but ovaries and uterus are intact.     OBJECTIVE:  Previous paps (date & results):  Lab Results   Component Value Date    PAP ASC-US 09/27/2017    PAP NIL 08/21/2009    PAP NIL 11/19/2007    PAP NIL 07/28/2005     Pap repeated?: Yes  Previous Biopsy?: No  Previous Cryo?: No  Previous LEEP?: Maybe- not sure. Not in our system or Care Everywhere.   Previous Laser?: No      COLPOSCOPIC EXAM: satisfactory    Examined with Acetic Acid staining?:Yes- Minimal acetowhite noted at 10 o'clock  Examined with Lugol's Iodine staining?: Yes  Vagina examined?: Yes- normal  Vulva examined?: Yes- normal  Refer to Education Images?: no    Colposcopic Impression: one small area of acetowhite, cervix and vaginal canal were normal in appearance with no concerning features. Patient was in mild distress at the beginning of the procedure, but became more distressed through the procedure due to increased anxiety and discomfort.     Post Colposcopy Instructions given?: yes - Bleeding may occur. Results will be sent via MyChart or letter if normal. I will call if there are any concerning results.       ASSESSMENT/PLAN:  Per encounter diagnoses and orders.      This document serves as a record of the services and decisions personally performed and made by My Santa MD. It was created on her behalf by Iris Nguyễn, a trained medical scribe. The creation of this document is based the provider's statements to the medical scribe.  Iris Nguyễn 5:07 PM 10/30/2017    Provider:   The information in this document, created by the medical scribe for me, accurately reflects the services I personally performed and the decisions made by me. I have reviewed and approved this document for accuracy prior to  leaving the patient care area.  My Santa MD 5:07 PM 10/30/2017    My Santa MD   [facility]

## 2017-10-30 NOTE — PATIENT INSTRUCTIONS
Bleeding may occur for a few hours afterwards.  Avoid intercourse for approximately 1 week to allow biopsy site(s) to occur.   If you experience abnormal bleeding or foul discharge, please return to the clinic.  Results will be sent via [employer] if you have it and the results are normal or I will call if there are any concerning results. I will call either way if you do not have MyChart.

## 2017-11-01 LAB — COPATH REPORT: NORMAL

## 2018-03-22 NOTE — TELEPHONE ENCOUNTER
Panel Management Review      Patient has the following on her problem list: None      Composite cancer screening  Chart review shows that this patient is due/due soon for the following Pap Smear, Mammogram and Colonoscopy  Summary:    Patient is due/failing the following:   COLONOSCOPY, MAMMOGRAM and PAP    Action needed:   Patient needs office visit for physical.    Type of outreach:    Sent letter.    Questions for provider review:    None                                                                                                                                    Huong ARAGON [facility]       Chart routed to Care Team .           [address] [facility]- patient returned call in regards to directions on meds   C/b [phone]

## 2018-12-02 ENCOUNTER — HEALTH MAINTENANCE LETTER (OUTPATIENT)
Age: 58
End: 2018-12-02

## 2019-05-22 ENCOUNTER — OFFICE VISIT (OUTPATIENT)
Dept: FAMILY MEDICINE | Facility: CLINIC | Age: 59
End: 2019-05-22
Payer: COMMERCIAL

## 2019-05-22 ENCOUNTER — ANCILLARY PROCEDURE (OUTPATIENT)
Dept: GENERAL RADIOLOGY | Facility: CLINIC | Age: 59
End: 2019-05-22
Attending: NURSE PRACTITIONER
Payer: COMMERCIAL

## 2019-05-22 VITALS
SYSTOLIC BLOOD PRESSURE: 104 MMHG | BODY MASS INDEX: 25.95 KG/M2 | DIASTOLIC BLOOD PRESSURE: 63 MMHG | RESPIRATION RATE: 16 BRPM | OXYGEN SATURATION: 94 % | WEIGHT: 143 LBS | HEART RATE: 74 BPM

## 2019-05-22 DIAGNOSIS — M19.011 PRIMARY LOCALIZED OSTEOARTHROSIS OF RIGHT SHOULDER REGION: ICD-10-CM

## 2019-05-22 DIAGNOSIS — G89.29 CHRONIC RIGHT SHOULDER PAIN: Primary | ICD-10-CM

## 2019-05-22 DIAGNOSIS — G89.29 CHRONIC RIGHT SHOULDER PAIN: ICD-10-CM

## 2019-05-22 DIAGNOSIS — M79.672 LEFT FOOT PAIN: ICD-10-CM

## 2019-05-22 DIAGNOSIS — M25.511 CHRONIC RIGHT SHOULDER PAIN: ICD-10-CM

## 2019-05-22 DIAGNOSIS — M25.511 CHRONIC RIGHT SHOULDER PAIN: Primary | ICD-10-CM

## 2019-05-22 PROCEDURE — 73630 X-RAY EXAM OF FOOT: CPT | Mod: LT

## 2019-05-22 PROCEDURE — 20610 DRAIN/INJ JOINT/BURSA W/O US: CPT | Mod: RT | Performed by: NURSE PRACTITIONER

## 2019-05-22 PROCEDURE — 99214 OFFICE O/P EST MOD 30 MIN: CPT | Mod: 25 | Performed by: NURSE PRACTITIONER

## 2019-05-22 PROCEDURE — 73030 X-RAY EXAM OF SHOULDER: CPT | Mod: RT

## 2019-05-22 RX ORDER — CYCLOBENZAPRINE HCL 10 MG
5 TABLET ORAL
Qty: 31 TABLET | Refills: 0 | Status: SHIPPED | OUTPATIENT
Start: 2019-05-22 | End: 2020-06-01

## 2019-05-22 RX ORDER — LIDOCAINE HYDROCHLORIDE 10 MG/ML
4 INJECTION, SOLUTION INFILTRATION; PERINEURAL ONCE
Status: COMPLETED | OUTPATIENT
Start: 2019-05-22 | End: 2019-05-22

## 2019-05-22 RX ORDER — TRIAMCINOLONE ACETONIDE 40 MG/ML
40 INJECTION, SUSPENSION INTRA-ARTICULAR; INTRAMUSCULAR ONCE
Status: COMPLETED | OUTPATIENT
Start: 2019-05-22 | End: 2019-05-22

## 2019-05-22 RX ORDER — MELOXICAM 15 MG/1
15 TABLET ORAL DAILY
Qty: 90 TABLET | Refills: 1 | Status: SHIPPED | OUTPATIENT
Start: 2019-05-22 | End: 2020-06-01

## 2019-05-22 RX ADMIN — LIDOCAINE HYDROCHLORIDE 4 ML: 10 INJECTION, SOLUTION INFILTRATION; PERINEURAL at 11:57

## 2019-05-22 RX ADMIN — TRIAMCINOLONE ACETONIDE 40 MG: 40 INJECTION, SUSPENSION INTRA-ARTICULAR; INTRAMUSCULAR at 11:58

## 2019-05-22 NOTE — PROGRESS NOTES
"Subjective     Astrid Santoro is a 58 year old female who presents to clinic today for the following health issues:    HPI   Musculoskeletal problem/pain      Duration: RIGHT Shoulder for a few years working at [employer] makes it worse. Overhead and away from the body makes it worse. Makes her up at night. no injury in the past. left foot for a few months felt like something she was standing on . Painful across the top of the foot to the left     Painful left foot at the end of 5-6 10 hr days in a row.    Description  Location: right shoulder, left foot    Intensity:  moderate    Accompanying signs and symptoms: Muscle spasms in calf and feet, cramps at night. As for shoulder \"just hurts\"    History  Previous similar problem: YES- shoulder, not foot  Previous evaluation:  none    Precipitating or alleviating factors:  Trauma or overuse: YES- overuse. Works 10+ hour shifts  Aggravating factors include: lifting and or moving arm away from body - shoulder.     Therapies tried and outcome: nothing    Muscle spasms worse at night.   Watching the muscles in the LE moving.   Pain over the top of the left foot with ambulation x6 to 12 months worse when walking at work  Rest helps. Smoker for 43 yrs. 1/2 ppd .   History of COPD using Spiriva.  Albuterol.  Fluticasone/salmeterol  No color changes in the feet.   Previous use of meloxicam no relief  Spot above lip has been there for a few months.  Denies any pain, was worse when scheduled appointment now resolving  Daily alcohol use ongoing.  BP Readings from Last 3 Encounters:   05/22/19 104/63   10/30/17 114/58   09/27/17 114/82    Wt Readings from Last 3 Encounters:   05/22/19 64.9 kg (143 lb)   10/30/17 73.2 kg (161 lb 6.4 oz)   09/27/17 74.4 kg (164 lb)         -------------------------------------  Reviewed and updated as needed this visit by Provider         Review of Systems    ROS: 10 point ROS neg other than the symptoms noted above in the HPI.        Objective    BP " 104/63   Pulse 74   Resp 16   Wt 64.9 kg (143 lb)   SpO2 94%   BMI 25.95 kg/m    Body mass index is 25.95 kg/m .  Physical Exam   GENERAL: healthy, alert and no distress  EYES: Eyes grossly normal to inspection, PERRL and conjunctivae and sclerae normal  HENT: ear canals and TM's normal, nose and mouth without ulcers or lesions  NECK: no adenopathy, no asymmetry, masses, or scars and thyroid normal to palpation  RESP: lungs rhonchi upper anterior clears with cough.   CV: regular rate and rhythm, normal S1 S2, no S3 or S4, no murmur, click or rub, no peripheral edema and peripheral pulses strong  ABDOMEN: soft, nontender, no hepatosplenomegaly, no masses and bowel sounds normal  MS: decreased range of motion RIGHT SHOULDER WITH OVERHEAD MOVEMENT AND LATERAL EXTENSION, tenderness to palpation LEFT TOP OF FOOT    SKIN: no suspicious lesions or rashes slightly scaling patch pea size left lip  NEURO: Normal strength and tone, mentation intact and speech normal  PSYCH: mentation appears normal, affect normal/bright    Recent Results (from the past 744 hour(s))   XR Shoulder Right 2 Views    Narrative    SHOULDER TWO VIEW RIGHT   5/22/2019 9:24 AM     HISTORY: Chronic right shoulder pain.    COMPARISON: None.      Impression    IMPRESSION: Advanced acromioclavicular degenerative changes with  inferior spurring. Glenohumeral joint is unremarkable. No acute  fracture or subluxation.    SCOTT MONTEJO MD   XR Foot Left G/E 3 Views    Narrative    LEFT FOOT THREE OR MORE VIEWS   5/22/2019 9:24 AM     HISTORY: Left foot pain.    COMPARISON: None.      Impression    IMPRESSION: Joint spaces appear fairly well-preserved. No evidence of  acute fracture or subluxation.    SCOTT MONTEJO MD             Assessment & Plan   Problem List Items Addressed This Visit     None      Visit Diagnoses     Chronic right shoulder pain    -  Primary    Relevant Medications    meloxicam (MOBIC) 15 MG tablet    cyclobenzaprine (FLEXERIL) 10 MG  tablet    triamcinolone (KENALOG-40) injection 40 mg (Completed)    lidocaine 1 % injection 4 mL (Completed)    Other Relevant Orders    XR Shoulder Right 2 Views (Completed)    Large Joint/Bursa injection and/or drainage (Shoulder, Knee) (Completed)    Left foot pain        Relevant Medications    meloxicam (MOBIC) 15 MG tablet    cyclobenzaprine (FLEXERIL) 10 MG tablet    triamcinolone (KENALOG-40) injection 40 mg (Completed)    Other Relevant Orders    XR Foot Left G/E 3 Views (Completed)    Primary localized osteoarthrosis of right shoulder region        Relevant Medications    meloxicam (MOBIC) 15 MG tablet    cyclobenzaprine (FLEXERIL) 10 MG tablet    triamcinolone (KENALOG-40) injection 40 mg (Completed)      Verbal consent was obtained.  Utilizing sterile technique.  A steroid injection was performed at right posterior shoulder    using 1% plain Lidocaine and 40 mg of Kenalog. This was well tolerated.    Restart meloxicam 15 mg daily  Flexeril 10 mg at at bedtime as needed  Topical Aspercreme recommended  Physical therapy recommended  Smoking cessation strongly recommended        Patient Instructions     Patient Education     Chronic Pain  Pain serves an important role. It lets you know something is wrong that needs your attention. When the body heals, pain normally goes away.  When pain lasts longer than 6 months, it is called  chronic  pain. This is pain that is present even after the body has healed. Chronic pain can cause mood problems and get in the way of your relationships and your daily life.  A number of conditions can cause chronic pain. Some of the more common include:    Previous surgery    An old injury    Infection    Diseases such as diabetes    Nerve damage    Back injury    Arthritis    Migraine or other headaches    Fibromyalgia    Cancer  Depression and stress can make chronic pain symptoms worse. In some cases, a cause for the pain can't be found.   Treatment  Treatment can greatly  reduce pain. In many cases, pain can become less severe, occur less often, and interfere less with your daily life. Chronic pain is often treated with a combination of medicines, therapies, and lifestyle changes. You will work closely with your healthcare provider to find a treatment plan that works best for you.    Ask your healthcare provider for a referral to a pain management specialty center. These can provide the most recent and proven pain management strategies, along with emotional support and comprehensive services.    Several different types of medicines may be prescribed for chronic pain. Work with your healthcare provider to develop a medicine plan that helps manage your pain.    Physical therapy can help reduce certain types of chronic pain.    Occupational therapy teaches you how to do routine tasks of daily living in ways that lessen your discomfort.    Counseling can help you cope better with stress and pain.    Other therapies such as meditation, yoga, biofeedback, massage, and acupuncture can also help manage chronic pain.    Changing certain habits can help reduce chronic pain. They include:  ? Eating healthy  ? Developing an exercise routine  ? Getting enough sleep   ? Stopping smoking and limiting alcohol use  ? Losing excess weight  Follow-up care  Follow up with your healthcare provider, or as advised. Let your healthcare provider know if your current treatment plan is working or if changes are needed.  Resources  For more information, contact:    American Headache and Migraine Association, ahma.memberclBestTravelWebsites.net or 738-722-6251    American Chronic Pain Association, theacpa.org or 007-149-8502  Date Last Reviewed: 8/1/2017 2000-2018 [employer]. [address] [place] [address]. All rights reserved. This information is not intended as a substitute for professional medical care. Always follow your healthcare professional's instructions.           Patient Education      Managing Chronic Pain   Being in pain can be exhausting. You may find you have trouble working, sleeping, or just doing day-to-day tasks. But you can learn to manage pain, feel better, and regain control of your life.   Understanding chronic pain  Chronic pain is a serious medical problem. It is defined as pain that lasts longer than 3 months. Chronic pain includes pain that you feel regularly, even if it comes and goes. The pain may be from an ongoing injury or health problem. Or it may be because of a chronic pain syndrome, such as fibromyalgia. Sometimes pain persists when no cause can be found.   Pain should be treated  You have a right to have your pain treated. Untreated chronic pain can affect your overall health. It can lead to depression, anxiety, anger, and personality changes. It can also disrupt work, sleep, relationships, and other aspects of normal life. It may not be possible to relieve all of your pain. But it can be reduced to a level you can cope with.   Your role in treatment  Your healthcare provider will work closely with you on a plan to manage your pain. But it s up to you to put this plan into action. Control of chronic pain is done mainly through self-management. This means that you take an active role in your care. Getting support from family and friends is important too.   Planning your treatment  Your healthcare provider will first look for a cause of your pain that can be treated. He or she will also assess your pain level. This may be done by asking you to rate your pain on a scale from 1 (low pain) to 10 (severe pain). Your provider will also ask you to describe the pain. For example, is your pain sharp or dull? Is it constant or does it come and go? You may be asked to keep a pain log. This is a diary in which you track your pain. It may help identify things that tend to make your pain worse. You and your healthcare provider can make a plan to help prevent and cope with pain on a  daily basis. In some cases, you may be referred to a special pain program or clinic. Your treatment plan may include:    Medicines    Complementary therapies    Mind and body therapies    Other medical treatments    Getting physical activity   Medicines  Medicine will most likely be a part of your treatment plan. Your provider will evaluate which are the best medicines for your pain. You may use over-the-counter or prescription medicines. You may need to take more than one medicine. It may take some time to find the best medicine or combination of medicines for your pain. Take all medicines as directed. Pain medicines can be used in many ways. You may take medicines:    Every day to help   stay ahead  of the pain so that it doesn t flare up    At times when pain is worse than usual    Before activities that tend to trigger pain    To decrease sensitivity to pain   Medicines for chronic pain include:    Nonsteroidal anti-inflammatory medicines (NSAIDs) for pain from swelling and inflammation. Your provider may prescribe a type of NSAID called a MAHER inhibitor.    Acetaminophen    Anticonvulsants to treat nerve pain called neuropathy    Antidepressants to treat neuropathy    Muscle relaxants for muscle spasms    Topical medicines. These are put on the skin.    Opioids, or narcotics, to treat severe pain. These very strong medicines can help ease certain kinds of pain. They most often are used for short periods of time. Your provider will monitor your care very closely if you take opioids.   Complementary therapies  These are treatments that can be used along with medical care to help relieve pain. Look for a licensed practitioner with experience treating chronic pain. Talk with your healthcare provider about using complementary therapies such as:    Massage    Acupuncture and acupressure    Chiropractic    Vitamins or herbal supplements   Mind/body therapies  The brain and the body are both part of the pain response.  The brain reads the pain signals from the body. This means that your mind has some control over how pain signals are processed. Mind/body therapies may help change how your brain reads pain signals. They may be learned with the help of a trained therapist or in a class. They include:    Deep breathing    Distraction    Visualization    Meditation    Biofeedback   Other medical treatments  If other treatments don t work for you, one of these procedures or devices may help:    Nerve blocks to numb nerves in a painful area    Trigger point injections for painful muscles    Steroid injections for joint pain     Transcutaneous electrical nerve stimulation (TENS) to block pain signals to the brain    Spinal stimulation to block spinal pain    Implanted spinal pump that contains pain medicine    Ablation using heat, cold, or chemicals to destroy painful nerves                                                                                                                    Getting physical activity  Being physically active has many benefits. It can improve your ability to cope with pain. It may also help improve your mood, sleep, and overall health. Your healthcare provider can help you plan an exercise program that s right for your needs. This may include:    Stretching and range-of-motion exercises    Low-impact exercise such as walking, biking, swimming, and other water exercise    Strength training using light weights    Walking up the stairs instead of taking the elevator    Riding a bike instead of driving    Parking your car farther from your destination   You may need to not do high-impact activities. These involve jumping, running, or sudden starts, stops, or changes of direction. If you haven t exercised in a long time or you have physical limitations, your healthcare provider may refer you to a physical therapist. He or she can teach you stretches and exercises that fit your condition and fitness level.   Being  active and healthy  A healthier lifestyle makes it easier to cope with pain and function better. Follow these tips:    Choose a balance of healthy foods and drinks.    Limit alcohol and caffeine.    Go to bed at about the same time each day.    Don t let pain keep you from others. Spend time with friends and family.    Keep your mind active. Read books or take classes.    If you re not working, volunteer or join a club or social group.   Getting support  A support group lets you talk with others who also have chronic pain. Chronic pain support groups can help you feel less isolated. They can also give you tips for coping with pain. To find a local support group, contact your nearest hospital or pain clinic.   You may also want to try counseling. Counseling can help you learn coping skills and methods such as visualization. It can also help with mood problems. When choosing a counselor, look for someone who has worked with people who have chronic pain.   See your provider for regular visits and let him or her know how well treatments are working for you. Also reach out to family and friends for help and support.                              For more support and information, contact these groups:    American Academy of Pain Management, www.aapainmanage.org    American Academy of Pain Medicine, www.painmed.org    American Chronic Pain Association, www.theacpa.org    National Pain Foundation, www.thenationalpainfoundation.org  Date Last Reviewed: 12/1/2017 2000-2018 [employer]. [address] [place] [address], [place] [address]. All rights reserved. This information is not intended as a substitute for professional medical care. Always follow your healthcare professional's instructions.             Return in about 1 month (around 6/22/2019) for Ongoing shoulder pain.    MARISSA [facility]

## 2019-05-22 NOTE — LETTER
May 23, 2019      Astrid Santoro  [address] [address]        Astrid,    Advanced degenerative changes right shoulder x-ray with spurring.   Physical therapy and MRI recommended with no resolution of discomfort with injection given.   Please feel free to call with any questions or concerns.      Resulted Orders   XR Shoulder Right 2 Views    Narrative    SHOULDER TWO VIEW RIGHT   5/22/2019 9:24 AM     HISTORY: Chronic right shoulder pain.    COMPARISON: None.      Impression    IMPRESSION: Advanced acromioclavicular degenerative changes with  inferior spurring. Glenohumeral joint is unremarkable. No acute  fracture or subluxation.    SCOTT MONTEJO MD          Take care,     Nohelia Darby MSN,FNP-[address] [address] [phone]

## 2019-05-22 NOTE — NURSING NOTE
"Initial /63   Pulse 74   Resp 16   Wt 64.9 kg (143 lb)   SpO2 94%   BMI 25.95 kg/m   Estimated body mass index is 25.95 kg/m  as calculated from the following:    Height as of 9/27/17: 1.581 m (5' 2.25\").    Weight as of this encounter: 64.9 kg (143 lb). .    Sharona Briones CMA on 5/22/2019 at 8:43 AM    "

## 2019-05-27 NOTE — PATIENT INSTRUCTIONS
Patient Education     Chronic Pain  Pain serves an important role. It lets you know something is wrong that needs your attention. When the body heals, pain normally goes away.  When pain lasts longer than 6 months, it is called  chronic  pain. This is pain that is present even after the body has healed. Chronic pain can cause mood problems and get in the way of your relationships and your daily life.  A number of conditions can cause chronic pain. Some of the more common include:    Previous surgery    An old injury    Infection    Diseases such as diabetes    Nerve damage    Back injury    Arthritis    Migraine or other headaches    Fibromyalgia    Cancer  Depression and stress can make chronic pain symptoms worse. In some cases, a cause for the pain can't be found.   Treatment  Treatment can greatly reduce pain. In many cases, pain can become less severe, occur less often, and interfere less with your daily life. Chronic pain is often treated with a combination of medicines, therapies, and lifestyle changes. You will work closely with your healthcare provider to find a treatment plan that works best for you.    Ask your healthcare provider for a referral to a pain management specialty center. These can provide the most recent and proven pain management strategies, along with emotional support and comprehensive services.    Several different types of medicines may be prescribed for chronic pain. Work with your healthcare provider to develop a medicine plan that helps manage your pain.    Physical therapy can help reduce certain types of chronic pain.    Occupational therapy teaches you how to do routine tasks of daily living in ways that lessen your discomfort.    Counseling can help you cope better with stress and pain.    Other therapies such as meditation, yoga, biofeedback, massage, and acupuncture can also help manage chronic pain.    Changing certain habits can help reduce chronic pain. They include:  ?  Eating healthy  ? Developing an exercise routine  ? Getting enough sleep   ? Stopping smoking and limiting alcohol use  ? Losing excess weight  Follow-up care  Follow up with your healthcare provider, or as advised. Let your healthcare provider know if your current treatment plan is working or if changes are needed.  Resources  For more information, contact:    American Headache and Migraine Associationflorentin.[employer] or 444-579-4737    American Chronic Pain Association, theacpa.org or 648-401-3993  Date Last Reviewed: 8/1/2017 2000-2018 [employer]. [address] [place] [address]. All rights reserved. This information is not intended as a substitute for professional medical care. Always follow your healthcare professional's instructions.           Patient Education     Managing Chronic Pain   Being in pain can be exhausting. You may find you have trouble working, sleeping, or just doing day-to-day tasks. But you can learn to manage pain, feel better, and regain control of your life.   Understanding chronic pain  Chronic pain is a serious medical problem. It is defined as pain that lasts longer than 3 months. Chronic pain includes pain that you feel regularly, even if it comes and goes. The pain may be from an ongoing injury or health problem. Or it may be because of a chronic pain syndrome, such as fibromyalgia. Sometimes pain persists when no cause can be found.   Pain should be treated  You have a right to have your pain treated. Untreated chronic pain can affect your overall health. It can lead to depression, anxiety, anger, and personality changes. It can also disrupt work, sleep, relationships, and other aspects of normal life. It may not be possible to relieve all of your pain. But it can be reduced to a level you can cope with.   Your role in treatment  Your healthcare provider will work closely with you on a plan to manage your pain. But it s up to you to put this plan  into action. Control of chronic pain is done mainly through self-management. This means that you take an active role in your care. Getting support from family and friends is important too.   Planning your treatment  Your healthcare provider will first look for a cause of your pain that can be treated. He or she will also assess your pain level. This may be done by asking you to rate your pain on a scale from 1 (low pain) to 10 (severe pain). Your provider will also ask you to describe the pain. For example, is your pain sharp or dull? Is it constant or does it come and go? You may be asked to keep a pain log. This is a diary in which you track your pain. It may help identify things that tend to make your pain worse. You and your healthcare provider can make a plan to help prevent and cope with pain on a daily basis. In some cases, you may be referred to a special pain program or clinic. Your treatment plan may include:    Medicines    Complementary therapies    Mind and body therapies    Other medical treatments    Getting physical activity   Medicines  Medicine will most likely be a part of your treatment plan. Your provider will evaluate which are the best medicines for your pain. You may use over-the-counter or prescription medicines. You may need to take more than one medicine. It may take some time to find the best medicine or combination of medicines for your pain. Take all medicines as directed. Pain medicines can be used in many ways. You may take medicines:    Every day to help   stay ahead  of the pain so that it doesn t flare up    At times when pain is worse than usual    Before activities that tend to trigger pain    To decrease sensitivity to pain   Medicines for chronic pain include:    Nonsteroidal anti-inflammatory medicines (NSAIDs) for pain from swelling and inflammation. Your provider may prescribe a type of NSAID called a MAHER inhibitor.    Acetaminophen    Anticonvulsants to treat nerve pain  called neuropathy    Antidepressants to treat neuropathy    Muscle relaxants for muscle spasms    Topical medicines. These are put on the skin.    Opioids, or narcotics, to treat severe pain. These very strong medicines can help ease certain kinds of pain. They most often are used for short periods of time. Your provider will monitor your care very closely if you take opioids.   Complementary therapies  These are treatments that can be used along with medical care to help relieve pain. Look for a licensed practitioner with experience treating chronic pain. Talk with your healthcare provider about using complementary therapies such as:    Massage    Acupuncture and acupressure    Chiropractic    Vitamins or herbal supplements   Mind/body therapies  The brain and the body are both part of the pain response. The brain reads the pain signals from the body. This means that your mind has some control over how pain signals are processed. Mind/body therapies may help change how your brain reads pain signals. They may be learned with the help of a trained therapist or in a class. They include:    Deep breathing    Distraction    Visualization    Meditation    Biofeedback   Other medical treatments  If other treatments don t work for you, one of these procedures or devices may help:    Nerve blocks to numb nerves in a painful area    Trigger point injections for painful muscles    Steroid injections for joint pain     Transcutaneous electrical nerve stimulation (TENS) to block pain signals to the brain    Spinal stimulation to block spinal pain    Implanted spinal pump that contains pain medicine    Ablation using heat, cold, or chemicals to destroy painful nerves                                                                                                                    Getting physical activity  Being physically active has many benefits. It can improve your ability to cope with pain. It may also help improve your  mood, sleep, and overall health. Your healthcare provider can help you plan an exercise program that s right for your needs. This may include:    Stretching and range-of-motion exercises    Low-impact exercise such as walking, biking, swimming, and other water exercise    Strength training using light weights    Walking up the stairs instead of taking the elevator    Riding a bike instead of driving    Parking your car farther from your destination   You may need to not do high-impact activities. These involve jumping, running, or sudden starts, stops, or changes of direction. If you haven t exercised in a long time or you have physical limitations, your healthcare provider may refer you to a physical therapist. He or she can teach you stretches and exercises that fit your condition and fitness level.   Being active and healthy  A healthier lifestyle makes it easier to cope with pain and function better. Follow these tips:    Choose a balance of healthy foods and drinks.    Limit alcohol and caffeine.    Go to bed at about the same time each day.    Don t let pain keep you from others. Spend time with friends and family.    Keep your mind active. Read books or take classes.    If you re not working, volunteer or join a club or social group.   Getting support  A support group lets you talk with others who also have chronic pain. Chronic pain support groups can help you feel less isolated. They can also give you tips for coping with pain. To find a local support group, contact your nearest hospital or pain clinic.   You may also want to try counseling. Counseling can help you learn coping skills and methods such as visualization. It can also help with mood problems. When choosing a counselor, look for someone who has worked with people who have chronic pain.   See your provider for regular visits and let him or her know how well treatments are working for you. Also reach out to family and friends for help and  support.                              For more support and information, contact these groups:    American Academy of Pain Management, www.aapainmanage.org    American Academy of Pain Medicine, www.painmed.org    American Chronic Pain Association, www.theacpa.org    National Pain Foundation, www.thenationalpainfoundation.org  Date Last Reviewed: 12/1/2017 2000-2018 [employer]. [address] [place] [address]. All rights reserved. This information is not intended as a substitute for professional medical care. Always follow your healthcare professional's instructions.

## 2020-05-05 ENCOUNTER — TELEPHONE (OUTPATIENT)
Dept: FAMILY MEDICINE | Facility: CLINIC | Age: 60
End: 2020-05-05

## 2020-05-05 NOTE — TELEPHONE ENCOUNTER
Broderick PEREZ Paper work  [facility] - need help completing the paper work. Possible appt.  [facility]

## 2020-05-06 ENCOUNTER — VIRTUAL VISIT (OUTPATIENT)
Dept: FAMILY MEDICINE | Facility: CLINIC | Age: 60
End: 2020-05-06
Payer: COMMERCIAL

## 2020-05-06 DIAGNOSIS — L93.0 LUPUS ERYTHEMATOSUS, UNSPECIFIED FORM: ICD-10-CM

## 2020-05-06 DIAGNOSIS — J42 CHRONIC BRONCHITIS, UNSPECIFIED CHRONIC BRONCHITIS TYPE (H): Primary | ICD-10-CM

## 2020-05-06 PROCEDURE — 99214 OFFICE O/P EST MOD 30 MIN: CPT | Mod: 95 | Performed by: NURSE PRACTITIONER

## 2020-05-06 NOTE — PATIENT INSTRUCTIONS
If you were tested, we will call you with your COVID-19 result. You don't need to call us to check on your result. You can also use the information at the end of this document to sign up for [facility] [employer] where you can get your results and a message about those results sent to you through the [employer] application.    Regardless of if you have been tested or not:  Patient who have symptoms (cough, fever, or shortness of breath), need to isolate for 7 days from when symptoms started AND 72 hours after fever resolves (without fever reducing medications) AND improvement of respiratory symptoms (whichever is longer).      Isolate yourself at home (in own room/own bathroom if possible)    Do Not allow any visitors    Do Not go to work or school    Do Not go to [employer],  centers, shopping, or other public places.    Do Not shake hands.    Avoid close and intimate contact with others (hugging, kissing).    Follow CDC recommendations for household cleaning of frequently touched services.     After the initial 7 days, continue to isolate yourself from household members as much as possible. To continue decrease the risk of community spread and exposure, you and any members of your household should limit activities in public for 14 days after starting home isolation.     You can reference the following CDC link for helpful home isolation/care tips:  https://www.cdc.gov/coronavirus/2019-ncov/downloads/10Things.pdf    Protect Others:    Cover Your Mouth and Nose with a mask, disposable tissue or wash cloth to avoid spreading germs to others.    Wash your hands and face frequently with soap and water    Call Back If: Breathing difficulty develops or you become worse.    For more information about COVID19 and options for caring for yourself at home, please visit the CDC website at https://www.cdc.gov/coronavirus/2019-ncov/about/steps-when-sick.html  For more options for care at [facility], please visit  our website at [email or web address]    For more information, please use the [place] Department of Health COVID-19 Website: https://www.health.[facility].mn.us/diseases/coronavirus/index.html  [place] Department of Health ([facility]) COVID-19 Hotlines (Interpreters available):      Health questions: Phone Number: [phone] or [phone] and Hours: 7 a.m. to 7 p.m.    Schools and  questions: Phone Number: [phone] or [phone] and Hours 7 a.m. to 7 p.m.

## 2020-05-06 NOTE — PROGRESS NOTES
"Astrid Santoro is a 59 year old female who is being evaluated via a billable video visit.      The patient has been notified of following:     \"This video visit will be conducted via a call between you and your physician/provider. We have found that certain health care needs can be provided without the need for an in-person physical exam.  This service lets us provide the care you need with a video conversation.  If a prescription is necessary we can send it directly to your pharmacy.  If lab work is needed we can place an order for that and you can then stop by our lab to have the test done at a later time.    Video visits are billed at different rates depending on your insurance coverage.  Please reach out to your insurance provider with any questions.    If during the course of the call the physician/provider feels a video visit is not appropriate, you will not be charged for this service.\"    Patient has given verbal consent for Video visit? Yes    How would you like to obtain your AVS? Mail a copy    Patient would like the video invitation sent by: Text to cell phone: [phone]    Will anyone else be joining your video visit? No      Subjective     Astrid Santoro is a 59 year old female who presents to clinic today for the following health issues:    HPI  FMLA-Paperwork       Duration: COVID-19    Description (location/character/radiation): Patient states that she is in need of FMLA paperwork so that she can continue to quarantine and not have to go to work.    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     SURGICAL HISTORY OF -   01/30/98, 09/03/97    Loop Electrosurgical Excision Procedure (LEEP)      TUBAL LIGATION         Social History     Tobacco Use     Smoking status: Current Every Day Smoker     Packs/day: 0.50     Years: 36.00     Pack years: 18.00     Types: Cigarettes     Smokeless tobacco: Never Used     Tobacco comment: cutting down,    Substance Use Topics     Alcohol use: Yes     Comment: 1-2 drinks per day ( 2 bottles)     Family History   Problem Relation Age of Onset     Heart Disease Father      Hypertension Father      Alcohol/Drug Father      Diabetes Maternal Grandmother      Family History Negative Mother          Current Outpatient Medications   Medication Sig Dispense Refill     albuterol (VENTOLIN HFA) 108 (90 BASE) MCG/ACT Inhaler Inhale 2 puffs into the lungs every 6 hours 3 Inhaler 3     Fluticasone-Salmeterol 113-14 MCG/ACT AEPB Inhale 1 puff into the lungs 2 times daily 3 each 3     tiotropium (SPIRIVA RESPIMAT) 2.5 MCG/ACT inhalation aerosol Inhale 2 puffs into the lungs daily 12 g 3     TYLENOL EXTRA STRENGTH OR as directed       cyclobenzaprine (FLEXERIL) 10 MG tablet Take 0.5 tablets (5 mg) by mouth nightly as needed for muscle spasms (Patient not taking: Reported on 5/6/2020) 31 tablet 0     estrogen, conjugated,-medroxyPROGESTERone (PREMPRO) 0.625-2.5 MG per tablet Take 1 tablet by mouth daily (Patient not taking: Reported on 5/6/2020) 90 tablet 3     IBU-200 PO as directed as needed       meloxicam (MOBIC) 15 MG tablet Take 1 tablet (15 mg) by mouth daily (Patient not taking: Reported on 5/6/2020) 90 tablet 1     meloxicam (MOBIC) 15 MG tablet Take 1 tablet (15 mg) by mouth daily (Patient not taking: Reported on 5/6/2020) 90 tablet 1     Allergies   Allergen Reactions     Shrimp Nausea and Vomiting     Recent Labs   Lab Test  "09/27/17  1606   A1C 4.9   *   HDL 84   TRIG 56   ALT 29      BP Readings from Last 3 Encounters:   05/22/19 104/63   10/30/17 114/58   09/27/17 114/82    Wt Readings from Last 3 Encounters:   05/22/19 64.9 kg (143 lb)   10/30/17 73.2 kg (161 lb 6.4 oz)   09/27/17 74.4 kg (164 lb)                    Reviewed and updated as needed this visit by Provider         Review of Systems   ROS COMP: Constitutional, HEENT, cardiovascular, pulmonary, gi and gu systems are negative, except as otherwise noted.      Objective    There were no vitals taken for this visit.  Estimated body mass index is 25.95 kg/m  as calculated from the following:    Height as of 9/27/17: 1.581 m (5' 2.25\").    Weight as of 5/22/19: 64.9 kg (143 lb).  Physical Exam     GENERAL: healthy, alert and no distress  EYES: Eyes grossly normal to inspection, conjunctivae and sclerae normal  RESP: no audible wheeze, cough, or visible cyanosis.  No visible retractions or increased work of breathing.  Able to speak fully in complete sentences.  NEURO: Cranial nerves grossly intact, mentation intact and speech normal  PSYCH: mentation appears normal, affect normal/bright, judgement and insight intact, normal speech and appearance well-groomed            Assessment & Plan     (J42) Chronic bronchitis, unspecified chronic bronchitis type (H)  (primary encounter diagnosis)  Comment: Controlled   Plan: no change in treatment plan     (L93.0) Lupus erythematosus, unspecified form  Comment: Controled   Plan: no change in her treatment plan    Due to patient's underlying medical conditions and risk of COVID will be off of work for the next 4 weeks [place] papers filled out.      Tobacco Cessation:   reports that she has been smoking cigarettes. She has a 18.00 pack-year smoking history. She has never used smokeless tobacco.  Tobacco Cessation Action Plan: Information offered: Patient not interested at this time        See Patient Instructions    Return in about 4 " weeks (around 6/3/2020) for Work re-evaluation .    MARISSA Thakkar CNP  [facility]      Video-Visit Details    Type of service:  Video Visit    Video End Time:10:05 AM    Originating Location (pt. Location): Home    Distant Location (provider location):  [facility]     Platform used for Video Visit: AmWell    Return in about 4 weeks (around 6/3/2020) for Work re-evaluation .       MARISSA Thakkar CNP

## 2020-05-06 NOTE — TELEPHONE ENCOUNTER
Form completed at virtual visit with Glenys Green 5/6/20  Faxed back to ,Broderick VASQUEZ  [phone]    Copy to scan.

## 2020-06-01 ENCOUNTER — VIRTUAL VISIT (OUTPATIENT)
Dept: FAMILY MEDICINE | Facility: CLINIC | Age: 60
End: 2020-06-01
Payer: COMMERCIAL

## 2020-06-01 DIAGNOSIS — J42 CHRONIC BRONCHITIS, UNSPECIFIED CHRONIC BRONCHITIS TYPE (H): Primary | ICD-10-CM

## 2020-06-01 PROCEDURE — 99212 OFFICE O/P EST SF 10 MIN: CPT | Mod: 95 | Performed by: NURSE PRACTITIONER

## 2020-06-01 NOTE — PATIENT INSTRUCTIONS
Patient Education     Discharge Instructions for Chronic Bronchitis  You have been diagnosed with chronic bronchitis. With this condition, you cough up mucus for 3 months or more each year for at least 2 years in a row.  Home care  Here is how you can take care of yourself at home:     If you smoke, quit. This is the best thing you can do for your bronchitis and overall health.  ? Try a stop-smoking program. There are even telephone and online programs.  ? Ask your healthcare provider about medicines or other methods to help you quit.  ? Ask family members to quit smoking as well.  ? Don t allow smoking in your home, in your car, or around you.    Protect yourself from infection.  ? Wash your hands often. Do your best to keep your hands away from your face. Most germs are spread from your hands to your mouth or nose.  ? Ask your healthcare provider about a yearly flu vaccine and pneumonia vaccines.  ? Stay away from crowds. It's especially important to do this in the winter when more people have colds and flu.  ? Take care of your overall health. That means:    Getting about 8 hours of sleep every night    Exercising for at least 30 minutes on most days    Eating lots of fresh fruits and vegetables, as well as whole grains, lean meats and fish, and low-fat dairy products. Not eating fat-filled and sugar-filled foods is also important.    Limiting the amount of alcohol you drink.    Take your medicines exactly as directed. Don t skip doses.    Talk with your healthcare provider about ways to keep your mucus thin. Drinking a lot of water helps.    Ask your healthcare provider to show you pursed-lip breathing to help decrease shortness of breath.    Find out about pulmonary rehab programs in your area. Ask your provider or local hospital.  Follow-up care  Follow up with your healthcare provider, or as advised.   When to call your healthcare provider  Call your provider right away if you have any of the  following:    Shortness of breath    Wheezing or coughing    Increased mucus    Yellow, green, bloody, or smelly mucus    Fever    Chills    Tightness in your chest that does not go away with your normal medicines    An irregular heartbeat or feeling that your heart is racing    Swollen ankles  Call 911  Call 911 if you have a hard time talking or catching your breath.  Date Last Reviewed: 5/1/2016 2000-2019 [employer]. [address] [place] [address], [place] [address]. All rights reserved. This information is not intended as a substitute for professional medical care. Always follow your healthcare professional's instructions.

## 2020-06-01 NOTE — LETTER
[facility]  [address] [place] [id number]  Phone: [phone]  Fax: [phone]    June 1, 2020        Astrid Santoro  [address] [address]          To whom it may concern:    RE: Astrid Santoro    Patient may return to work 6/01/2020  with the following:  No working or lifting restrictions.  If further concerns regarding COVID and work will need to review with employer.    Please contact me for questions or concerns.      Sincerely,        MARISSA Thakkar CNP

## 2020-06-01 NOTE — PROGRESS NOTES
"Astrid Santoro is a 59 year old female who is being evaluated via a billable video visit.      The patient has been notified of following:     \"This video visit will be conducted via a call between you and your physician/provider. We have found that certain health care needs can be provided without the need for an in-person physical exam.  This service lets us provide the care you need with a video conversation.  If a prescription is necessary we can send it directly to your pharmacy.  If lab work is needed we can place an order for that and you can then stop by our lab to have the test done at a later time.    Video visits are billed at different rates depending on your insurance coverage.  Please reach out to your insurance provider with any questions.    If during the course of the call the physician/provider feels a video visit is not appropriate, you will not be charged for this service.\"    Patient has given verbal consent for Video visit? Yes    How would you like to obtain your AVS? Mail a copy    Patient would like the video invitation sent by: Text to cell phone: [phone]    Will anyone else be joining your video visit? No    Subjective     Astrid Santoro is a 59 year old female who presents today via video visit for the following health issues:    HPI  Patient needs to be re evaluated to return to work. She has COPD and is at high risk for COVID19.    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     SURGICAL HISTORY OF -   01/30/98, 09/03/97    Loop Electrosurgical Excision Procedure (LEEP)      TUBAL LIGATION         Social History     Tobacco Use     " "Smoking status: Current Every Day Smoker     Packs/day: 0.50     Years: 36.00     Pack years: 18.00     Types: Cigarettes     Smokeless tobacco: Never Used     Tobacco comment: cutting down,    Substance Use Topics     Alcohol use: Yes     Comment: 1-2 drinks per day ( 2 bottles)     Family History   Problem Relation Age of Onset     Heart Disease Father      Hypertension Father      Alcohol/Drug Father      Diabetes Maternal Grandmother      Family History Negative Mother          Current Outpatient Medications   Medication Sig Dispense Refill     albuterol (VENTOLIN HFA) 108 (90 BASE) MCG/ACT Inhaler Inhale 2 puffs into the lungs every 6 hours 3 Inhaler 3     tiotropium (SPIRIVA RESPIMAT) 2.5 MCG/ACT inhalation aerosol Inhale 2 puffs into the lungs daily 12 g 3     TYLENOL EXTRA STRENGTH OR as directed       Allergies   Allergen Reactions     Shrimp Nausea and Vomiting     Recent Labs   Lab Test 09/27/17  1606   A1C 4.9   *   HDL 84   TRIG 56   ALT 29      BP Readings from Last 3 Encounters:   05/22/19 104/63   10/30/17 114/58   09/27/17 114/82    Wt Readings from Last 3 Encounters:   05/22/19 64.9 kg (143 lb)   10/30/17 73.2 kg (161 lb 6.4 oz)   09/27/17 74.4 kg (164 lb)                    Reviewed and updated as needed this visit by Provider         Review of Systems   Constitutional, HEENT, cardiovascular, pulmonary, GI, , musculoskeletal, neuro, skin, endocrine and psych systems are negative, except as otherwise noted.      Objective    There were no vitals taken for this visit.  Estimated body mass index is 25.95 kg/m  as calculated from the following:    Height as of 9/27/17: 1.581 m (5' 2.25\").    Weight as of 5/22/19: 64.9 kg (143 lb).  Physical Exam     GENERAL: Healthy, alert and no distress  EYES: Eyes grossly normal to inspection.  No discharge or erythema, or obvious scleral/conjunctival abnormalities.  RESP: No audible wheeze, cough, or visible cyanosis.  No visible retractions or increased " work of breathing.    SKIN: Visible skin clear. No significant rash, abnormal pigmentation or lesions.  NEURO: Cranial nerves grossly intact.  Mentation and speech appropriate for age.  PSYCH: Mentation appears normal, affect normal/bright, judgement and insight intact, normal speech and appearance well-groomed.              Assessment & Plan     (J42) Chronic bronchitis, unspecified chronic bronchitis type (H)  (primary encounter diagnosis)  Comment: Controlled patient is able to return back to work if further concerns regarding work should review with her employer.  Plan:        See Patient Instructions    No follow-ups on file.    MARISSA Thakkar CNP  [facility]      Video-Visit Details    Type of service:  Video Visit    Video End Time:8:32 AM    Originating Location (pt. Location): Home    Distant Location (provider location):  [facility]     [facility]    No follow-ups on file.       MARISSA Thakkar CNP

## 2020-06-10 ENCOUNTER — VIRTUAL VISIT (OUTPATIENT)
Dept: FAMILY MEDICINE | Facility: CLINIC | Age: 60
End: 2020-06-10
Payer: COMMERCIAL

## 2020-06-10 DIAGNOSIS — J42 CHRONIC BRONCHITIS, UNSPECIFIED CHRONIC BRONCHITIS TYPE (H): Primary | ICD-10-CM

## 2020-06-10 PROCEDURE — 99212 OFFICE O/P EST SF 10 MIN: CPT | Mod: 95 | Performed by: NURSE PRACTITIONER

## 2020-06-10 NOTE — PROGRESS NOTES
"Astrid Santoro is a 59 year old female who is being evaluated via a billable video visit.      The patient has been notified of following:     \"This video visit will be conducted via a call between you and your physician/provider. We have found that certain health care needs can be provided without the need for an in-person physical exam.  This service lets us provide the care you need with a video conversation.  If a prescription is necessary we can send it directly to your pharmacy.  If lab work is needed we can place an order for that and you can then stop by our lab to have the test done at a later time.    Video visits are billed at different rates depending on your insurance coverage.  Please reach out to your insurance provider with any questions.    If during the course of the call the physician/provider feels a video visit is not appropriate, you will not be charged for this service.\"    Patient has given verbal consent for Video visit? Yes    How would you like to obtain your AVS? Mail a copy    Patient would like the video invitation sent by: Text to cell phone: [phone]    Will anyone else be joining your video visit? No      Subjective     Astrid Santoro is a 59 year old female who presents today via video visit for the following health issues:    HPI  Patient is following up with her cough and breathing. She states that she is feeling good today.     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     SURGICAL HISTORY OF -   01/30/98, 09/03/97    Loop Electrosurgical Excision Procedure (LEEP)      TUBAL LIGATION         Social History     Tobacco Use     " "Smoking status: Current Every Day Smoker     Packs/day: 0.50     Years: 36.00     Pack years: 18.00     Types: Cigarettes     Smokeless tobacco: Never Used     Tobacco comment: cutting down,    Substance Use Topics     Alcohol use: Yes     Comment: 1-2 drinks per day ( 2 bottles)     Family History   Problem Relation Age of Onset     Heart Disease Father      Hypertension Father      Alcohol/Drug Father      Diabetes Maternal Grandmother      Family History Negative Mother          Current Outpatient Medications   Medication Sig Dispense Refill     albuterol (VENTOLIN HFA) 108 (90 BASE) MCG/ACT Inhaler Inhale 2 puffs into the lungs every 6 hours 3 Inhaler 3     tiotropium (SPIRIVA RESPIMAT) 2.5 MCG/ACT inhalation aerosol Inhale 2 puffs into the lungs daily 12 g 3     TYLENOL EXTRA STRENGTH OR as directed       Allergies   Allergen Reactions     Shrimp Nausea and Vomiting     Recent Labs   Lab Test 09/27/17  1606   A1C 4.9   *   HDL 84   TRIG 56   ALT 29      BP Readings from Last 3 Encounters:   05/22/19 104/63   10/30/17 114/58   09/27/17 114/82    Wt Readings from Last 3 Encounters:   05/22/19 64.9 kg (143 lb)   10/30/17 73.2 kg (161 lb 6.4 oz)   09/27/17 74.4 kg (164 lb)                    Reviewed and updated as needed this visit by Provider         Review of Systems   Constitutional, HEENT, cardiovascular, pulmonary, GI, , musculoskeletal, neuro, skin, endocrine and psych systems are negative, except as otherwise noted.      Objective    There were no vitals taken for this visit.  Estimated body mass index is 25.95 kg/m  as calculated from the following:    Height as of 9/27/17: 1.581 m (5' 2.25\").    Weight as of 5/22/19: 64.9 kg (143 lb).  Physical Exam     GENERAL: Healthy, alert and no distress  EYES: Eyes grossly normal to inspection.  No discharge or erythema, or obvious scleral/conjunctival abnormalities.  RESP: No audible wheeze, cough, or visible cyanosis.  No visible retractions or increased " work of breathing.    SKIN: Visible skin clear. No significant rash, abnormal pigmentation or lesions.  NEURO: Cranial nerves grossly intact.  Mentation and speech appropriate for age.  PSYCH: Mentation appears normal, affect normal/bright, judgement and insight intact, normal speech and appearance well-groomed.            Assessment & Plan   (J42) Chronic bronchitis, unspecified chronic bronchitis type (H)  (primary encounter diagnosis)  Comment: *Patient's COPD controlled patient wanted a letter for work that she cannot wear a mask.  Unable to write that letter for patient did review with patient she will need to review with her work   plan: Follow-up with work in regards to their requirements and mask requirements  cannot medically write a note that states she cannot wear a mask           Return in about 1 week (around 6/17/2020), or if symptoms worsen or fail to improve.    MARISSA Thakkar CNP  [facility]      Video-Visit Details    Type of service:  Video Visit    Video End Time:2:31 PM    Originating Location (pt. Location): Home    Distant Location (provider location):  [facility]     Platform used for Video Visit: [employer]    Return in about 1 week (around 6/17/2020), or if symptoms worsen or fail to improve.       MARISSA Thakkar CNP

## 2020-06-15 NOTE — PATIENT INSTRUCTIONS
Patient Education     Discharge Instructions for Chronic Bronchitis  You have been diagnosed with chronic bronchitis. With this condition, you cough up mucus for 3 months or more each year for at least 2 years in a row.  Home care  Here is how you can take care of yourself at home:     If you smoke, quit. This is the best thing you can do for your bronchitis and overall health.  ? Try a stop-smoking program. There are even telephone and online programs.  ? Ask your healthcare provider about medicines or other methods to help you quit.  ? Ask family members to quit smoking as well.  ? Don t allow smoking in your home, in your car, or around you.    Protect yourself from infection.  ? Wash your hands often. Do your best to keep your hands away from your face. Most germs are spread from your hands to your mouth or nose.  ? Ask your healthcare provider about a yearly flu vaccine and pneumonia vaccines.  ? Stay away from crowds. It's especially important to do this in the winter when more people have colds and flu.  ? Take care of your overall health. That means:    Getting about 8 hours of sleep every night    Exercising for at least 30 minutes on most days    Eating lots of fresh fruits and vegetables, as well as whole grains, lean meats and fish, and low-fat dairy products. Not eating fat-filled and sugar-filled foods is also important.    Limiting the amount of alcohol you drink.    Take your medicines exactly as directed. Don t skip doses.    Talk with your healthcare provider about ways to keep your mucus thin. Drinking a lot of water helps.    Ask your healthcare provider to show you pursed-lip breathing to help decrease shortness of breath.    Find out about pulmonary rehab programs in your area. Ask your provider or local hospital.  Follow-up care  Follow up with your healthcare provider, or as advised.   When to call your healthcare provider  Call your provider right away if you have any of the  following:    Shortness of breath    Wheezing or coughing    Increased mucus    Yellow, green, bloody, or smelly mucus    Fever    Chills    Tightness in your chest that does not go away with your normal medicines    An irregular heartbeat or feeling that your heart is racing    Swollen ankles  Call 911  Call 911 if you have a hard time talking or catching your breath.  Date Last Reviewed: 5/1/2016 2000-2019 [employer]. [address] [place] [address], [place] [address]. All rights reserved. This information is not intended as a substitute for professional medical care. Always follow your healthcare professional's instructions.

## 2020-06-30 ENCOUNTER — OFFICE VISIT (OUTPATIENT)
Dept: URGENT CARE | Facility: URGENT CARE | Age: 60
End: 2020-06-30
Payer: COMMERCIAL

## 2020-06-30 VITALS
RESPIRATION RATE: 16 BRPM | OXYGEN SATURATION: 95 % | DIASTOLIC BLOOD PRESSURE: 70 MMHG | HEART RATE: 82 BPM | BODY MASS INDEX: 24.18 KG/M2 | WEIGHT: 131.4 LBS | SYSTOLIC BLOOD PRESSURE: 120 MMHG | HEIGHT: 62 IN | TEMPERATURE: 98.4 F

## 2020-06-30 DIAGNOSIS — N76.0 BV (BACTERIAL VAGINOSIS): Primary | ICD-10-CM

## 2020-06-30 DIAGNOSIS — B96.89 BV (BACTERIAL VAGINOSIS): Primary | ICD-10-CM

## 2020-06-30 DIAGNOSIS — R30.0 DYSURIA: ICD-10-CM

## 2020-06-30 LAB
ALBUMIN UR-MCNC: NEGATIVE MG/DL
APPEARANCE UR: CLEAR
BACTERIA #/AREA URNS HPF: ABNORMAL /HPF
BILIRUB UR QL STRIP: NEGATIVE
COLOR UR AUTO: YELLOW
GLUCOSE UR STRIP-MCNC: NEGATIVE MG/DL
HGB UR QL STRIP: ABNORMAL
KETONES UR STRIP-MCNC: NEGATIVE MG/DL
LEUKOCYTE ESTERASE UR QL STRIP: NEGATIVE
NITRATE UR QL: NEGATIVE
NON-SQ EPI CELLS #/AREA URNS LPF: ABNORMAL /LPF
PH UR STRIP: 6 PH (ref 5–7)
RBC #/AREA URNS AUTO: ABNORMAL /HPF
SOURCE: ABNORMAL
SP GR UR STRIP: 1.02 (ref 1–1.03)
SPECIMEN SOURCE: ABNORMAL
UROBILINOGEN UR STRIP-ACNC: 0.2 EU/DL (ref 0.2–1)
WBC #/AREA URNS AUTO: ABNORMAL /HPF
WET PREP SPEC: ABNORMAL

## 2020-06-30 PROCEDURE — 87210 SMEAR WET MOUNT SALINE/INK: CPT | Performed by: PHYSICIAN ASSISTANT

## 2020-06-30 PROCEDURE — 87086 URINE CULTURE/COLONY COUNT: CPT | Performed by: PHYSICIAN ASSISTANT

## 2020-06-30 PROCEDURE — 81001 URINALYSIS AUTO W/SCOPE: CPT | Performed by: PHYSICIAN ASSISTANT

## 2020-06-30 PROCEDURE — 99213 OFFICE O/P EST LOW 20 MIN: CPT | Performed by: PHYSICIAN ASSISTANT

## 2020-06-30 RX ORDER — CLINDAMYCIN HCL 300 MG
300 CAPSULE ORAL 2 TIMES DAILY
Qty: 14 CAPSULE | Refills: 0 | Status: SHIPPED | OUTPATIENT
Start: 2020-06-30 | End: 2020-07-07

## 2020-06-30 ASSESSMENT — ENCOUNTER SYMPTOMS
FREQUENCY: 0
MYALGIAS: 0
CHILLS: 0
HEMATURIA: 0
RHINORRHEA: 0
PALPITATIONS: 0
NAUSEA: 0
EYE PAIN: 0
UNEXPECTED WEIGHT CHANGE: 0
ARTHRALGIAS: 0
ABDOMINAL PAIN: 0
DYSURIA: 1
VOMITING: 0
DIARRHEA: 0
FEVER: 0
COUGH: 0
EYE REDNESS: 0
CHEST TIGHTNESS: 0
FATIGUE: 0
WHEEZING: 0
SINUS PRESSURE: 0
BACK PAIN: 0
SORE THROAT: 0
SHORTNESS OF BREATH: 0
TROUBLE SWALLOWING: 0
FLANK PAIN: 0

## 2020-06-30 ASSESSMENT — MIFFLIN-ST. JEOR: SCORE: 1138.25

## 2020-06-30 NOTE — PROGRESS NOTES
SUBJECTIVE:   Astrid Santoro is a 59 year old female presenting with a chief complaint of   Chief Complaint   Patient presents with     UTI     2 weeks ago had discharge. 5 days burning, frequency but only a little comes out. Needs refill on Ventolin inhaler.     Refill Request       She is an established patient of [facility].    UTI    Onset of symptoms was 2week(s).  Course of illness is same  Severity moderate  Current and associated symptoms dysuria and frequency  Treatment and measures tried None  Predisposing factors include none  Patient denies rigors, flank pain and temperature > 101 degrees F.          Review of Systems   Constitutional: Negative for chills, fatigue, fever and unexpected weight change.   HENT: Negative for congestion, ear pain, postnasal drip, rhinorrhea, sinus pressure, sore throat and trouble swallowing.    Eyes: Negative for pain, redness and visual disturbance.   Respiratory: Negative for cough, chest tightness, shortness of breath and wheezing.    Cardiovascular: Negative for chest pain and palpitations.   Gastrointestinal: Negative for abdominal pain, diarrhea, nausea and vomiting.   Genitourinary: Positive for dysuria and vaginal discharge. Negative for flank pain, frequency, hematuria and vaginal pain.   Musculoskeletal: Negative for arthralgias, back pain and myalgias.   Skin: Negative for rash.       Past Medical History:   Diagnosis Date     ASCUS with positive high risk HPV cervical 10/4/2017     Human papillomavirus in conditions classified elsewhere and of unspecified site      Lupus erythematosus      Other, mixed, or unspecified nondependent drug abuse, unspecified     Meth, Marijuana. Currently smoking marijuana.     Raynaud's syndrome      Family History   Problem Relation Age of Onset     Heart Disease Father      Hypertension Father      Alcohol/Drug Father      Diabetes Maternal Grandmother      Family History Negative Mother      Current Outpatient Medications  "  Medication Sig Dispense Refill     albuterol (VENTOLIN HFA) 108 (90 BASE) MCG/ACT Inhaler Inhale 2 puffs into the lungs every 6 hours 3 Inhaler 3     clindamycin (CLEOCIN) 300 MG capsule Take 1 capsule (300 mg) by mouth 2 times daily for 7 days 14 capsule 0     tiotropium (SPIRIVA RESPIMAT) 2.5 MCG/ACT inhalation aerosol Inhale 2 puffs into the lungs daily 12 g 3     TYLENOL EXTRA STRENGTH OR as directed       Social History     Tobacco Use     Smoking status: Current Every Day Smoker     Packs/day: 0.50     Years: 36.00     Pack years: 18.00     Types: Cigarettes     Smokeless tobacco: Never Used     Tobacco comment: cutting down,    Substance Use Topics     Alcohol use: Yes     Comment: 1-2 drinks per day ( 2 bottles)       OBJECTIVE  /70 (BP Location: Right arm, Patient Position: Sitting, Cuff Size: Adult Regular)   Pulse 82   Temp 98.4  F (36.9  C) (Tympanic)   Resp 16   Ht 1.581 m (5' 2.25\")   Wt 59.6 kg (131 lb 6.4 oz)   LMP 02/27/2013   SpO2 95%   BMI 23.84 kg/m      Physical Exam  Constitutional:       General: She is not in acute distress.     Appearance: She is well-developed.   Abdominal:      Tenderness: There is no right CVA tenderness or left CVA tenderness.   Psychiatric:         Behavior: Behavior normal.         Labs:  Results for orders placed or performed in visit on 06/30/20 (from the past 24 hour(s))   *UA reflex to Microscopic and Culture ([place] and [facility] (except [place] and [place])    Specimen: Midstream Urine   Result Value Ref Range    Color Urine Yellow     Appearance Urine Clear     Glucose Urine Negative NEG^Negative mg/dL    Bilirubin Urine Negative NEG^Negative    Ketones Urine Negative NEG^Negative mg/dL    Specific Gravity Urine 1.020 1.003 - 1.035    Blood Urine Trace (A) NEG^Negative    pH Urine 6.0 5.0 - 7.0 pH    Protein Albumin Urine Negative NEG^Negative mg/dL    Urobilinogen Urine 0.2 0.2 - 1.0 EU/dL    Nitrite Urine Negative NEG^Negative    " Leukocyte Esterase Urine Negative NEG^Negative    Source Midstream Urine    Wet prep    Specimen: Vagina   Result Value Ref Range    Specimen Description Vagina     Wet Prep No Trichomonas seen     Wet Prep Clue cells seen  Moderate   (A)     Wet Prep No yeast seen     Wet Prep No WBC's seen    Urine Microscopic   Result Value Ref Range    WBC Urine 0 - 5 OTO5^0 - 5 /HPF    RBC Urine O - 2 OTO2^O - 2 /HPF    Squamous Epithelial /LPF Urine Few FEW^Few /LPF    Bacteria Urine Few (A) NEG^Negative /HPF       X-Ray was not done.    ASSESSMENT:      ICD-10-CM    1. BV (bacterial vaginosis)  N76.0 clindamycin (CLEOCIN) 300 MG capsule    B96.89    2. Dysuria  R30.0 *UA reflex to Microscopic and Culture ([facility] (except [place] and [place])     Wet prep     Urine Microscopic     Urine Culture Aerobic Bacterial        Medical Decision Making:    Differential Diagnosis:  UTI: UTI, Pyelonephritis and Vaginitis    Serious Comorbid Conditions:  Adult:  None    PLAN:    Bacterial vaginosis: will treat with clindamycin two times daily x 7 days. Return to clinic if symptoms worsen or do not improve; otherwise follow up as needed      Dysuria: urine culture pending. Will treat based on culture results.     Followup:    If not improving or if condition worsens, follow up with your Primary Care Provider    There are no Patient Instructions on file for this visit.

## 2020-07-01 LAB
BACTERIA SPEC CULT: NO GROWTH
Lab: NORMAL
SPECIMEN SOURCE: NORMAL

## 2020-07-01 NOTE — RESULT ENCOUNTER NOTE
Emergency Dept/Urgent Care discharge antibiotic (if prescribed): Clindamycin (Cleocin) 300 mg PO capsule, 1 capsule (300 mg) by mouth 2 times daily for 7 days  Date of Rx (if applicable):  6/30/20  No changes in treatment per Urine culture protocol.

## 2020-11-05 ENCOUNTER — OFFICE VISIT (OUTPATIENT)
Dept: URGENT CARE | Facility: URGENT CARE | Age: 60
End: 2020-11-05
Payer: COMMERCIAL

## 2020-11-05 VITALS
BODY MASS INDEX: 24.25 KG/M2 | TEMPERATURE: 97.9 F | RESPIRATION RATE: 16 BRPM | DIASTOLIC BLOOD PRESSURE: 68 MMHG | WEIGHT: 131.8 LBS | SYSTOLIC BLOOD PRESSURE: 118 MMHG | HEART RATE: 76 BPM | HEIGHT: 62 IN | OXYGEN SATURATION: 97 %

## 2020-11-05 DIAGNOSIS — R39.9 UNSPECIFIED SYMPTOMS AND SIGNS INVOLVING THE GENITOURINARY SYSTEM: ICD-10-CM

## 2020-11-05 DIAGNOSIS — B96.89 BV (BACTERIAL VAGINOSIS): Primary | ICD-10-CM

## 2020-11-05 DIAGNOSIS — N76.0 BV (BACTERIAL VAGINOSIS): Primary | ICD-10-CM

## 2020-11-05 LAB
SPECIMEN SOURCE: ABNORMAL
WET PREP SPEC: ABNORMAL

## 2020-11-05 PROCEDURE — 99213 OFFICE O/P EST LOW 20 MIN: CPT | Performed by: NURSE PRACTITIONER

## 2020-11-05 PROCEDURE — 87210 SMEAR WET MOUNT SALINE/INK: CPT | Performed by: NURSE PRACTITIONER

## 2020-11-05 RX ORDER — METRONIDAZOLE 500 MG/1
500 TABLET ORAL 2 TIMES DAILY
Qty: 14 TABLET | Refills: 0 | Status: SHIPPED | OUTPATIENT
Start: 2020-11-05 | End: 2020-11-12

## 2020-11-05 ASSESSMENT — MIFFLIN-ST. JEOR: SCORE: 1135.06

## 2020-11-05 NOTE — PATIENT INSTRUCTIONS
Take medication as directed.    Follow-up with your primary care provider next week and as needed.    Indications for emergent return to emergency department discussed with patient, who verbalized good understanding and agreement.  Patient understands the limitations of today's evaluation.         Patient Education     Bacterial Vaginosis    You have a vaginal infection called bacterial vaginosis (BV). Both good and bad bacteria are present in a healthy vagina. BV occurs when these bacteria get out of balance. The number of bad bacteria increase. And the number of good bacteria decrease. Although BV is associated with sexual activity, it is not a sexually transmitted disease.  BV may or may not cause symptoms. If symptoms do occur, they can include:    Thin, gray, milky-white, or sometimes green discharge    Unpleasant odor or  fishy  smell    Itching, burning, or pain in or around the vagina  It is not known what causes BV, but certain factors can make the problem more likely. This can include:    Douching    Having sex with a new partner    Having sex with more than one partner  BV will sometimes go away on its own. But treatment is usually recommended. This is because untreated BV can increase the risk of more serious health problems such as:    Pelvic inflammatory disease (PID)     delivery (giving birth to a baby early if you re pregnant)    HIV and certain other sexually transmitted diseases (STDs)    Infection after surgery on the reproductive organs  Home care  General care    BV is most often treated with medicines called antibiotics. These may be given as pills or as a vaginal cream. If antibiotics are prescribed, be sure to use them exactly as directed. Also, be sure to complete all of the medicine, even if your symptoms go away.    Don't douche or having sex during treatment.    If you have sex with a female partner, ask your healthcare provider if she should also be treated.  Prevention    Don't  douche.    Don't have sex. If you do have sex, then take steps to lower your risk:  ? Use condoms when having sex.  ? Limit the number of sexual partners you have.  Follow-up care  Follow up with your healthcare provider, or as advised.  When to seek medical advice  Call your healthcare provider right away if:    You have a fever of 100.4 F (38 C) or higher, or as directed by your provider.    Your symptoms worsen, or they don t go away within a few days of starting treatment.    You have new pain in the lower belly or pelvic region.    You have side effects that bother you or a reaction to the pills or cream you re prescribed.    You or any partners you have sex with have new symptoms, such as a rash, joint pain, or sores.  [employer] last reviewed this educational content on 10/1/2017    1632-5210 [employer]. [address] [place] [address]. All rights reserved. This information is not intended as a substitute for professional medical care. Always follow your healthcare professional's instructions.           Patient Education     Vaginal Infection: Bacterial Vaginosis  Both good and bad bacteria are present in a healthy vagina. Bacterial vaginosis (BV) occurs when these bacteria get out of balance. The numbers of good bacteria decrease. This allows the numbers of bad bacteria to increase and cause BV. In most cases, BV is not a serious problem.  Causes of bacterial vaginosis  The cause of BV is not clear. Douching may lead to it. Having sex with a new partner or more than 1 partner makes it more likely.  Symptoms of bacterial vaginosis  Symptoms of BV vary for each woman. Some women have few symptoms or none at all. If symptoms are present, they can include:    Thin, milky white or gray or sometimes green discharge    Unpleasant  fishy  odor    Irritation, itching, and burning at opening of vagina which may indicate mixed vaginitis      Burning or irritation with sex or urination which may  indicate mixed vaginitis  Diagnosing bacterial vaginosis  Your healthcare provider will ask about your symptoms and health history. He or she will also do a pelvic exam. This is an exam of your vagina and cervix. A sample of vaginal fluid or discharge may be taken. This sample is checked for signs of BV.  Treating bacterial vaginosis  BV is often treated with antibiotics. They may be given in oral pill form or as a vaginal cream. To use these medicines:    Be sure to take all of your medicine, even if your symptoms go away.    If you re taking antibiotic pills, do not drink alcohol until you re finished with all of your medicine.    If you re using vaginal cream, apply it as directed. Be aware that the cream may make condoms and diaphragms less effective.    Call your healthcare provider if symptoms do not go away within 4 days of starting treatment. Also call if you have a reaction to the medicine.  Why treatment matters  Even if you have no symptoms or your symptoms are mild, BV should be treated. Untreated BV can lead to health problems such as:    Increased risk of  delivery if you re pregnant    Increased risk of complications after surgery on the reproductive organs    Possible increased risk of pelvic inflammatory disease (PID)  [employer] last reviewed this educational content on 3/1/2017    1185-9121 [employer]. [address] [place] [address], [place] [address]. All rights reserved. This information is not intended as a substitute for professional medical care. Always follow your healthcare professional's instructions.

## 2020-11-05 NOTE — PROGRESS NOTES
"Subjective     Astrid Santoro is a 60 year old female who presents to clinic today for the following health issues:    HPI          Chief Complaint   Patient presents with     Vaginitis     5 days having burning and itching.             Review of Systems   Constitutional, HEENT, cardiovascular, pulmonary, GI, , musculoskeletal, neuro, skin, endocrine and psych systems are negative, except as otherwise noted.      Objective    /68 (BP Location: Right arm, Patient Position: Sitting, Cuff Size: Adult Regular)   Pulse 76   Temp 97.9  F (36.6  C) (Tympanic)   Resp 16   Ht 1.581 m (5' 2.25\")   Wt 59.8 kg (131 lb 12.8 oz)   LMP 02/27/2013   SpO2 97%   BMI 23.91 kg/m    Body mass index is 23.91 kg/m .  Physical Exam   GENERAL: healthy, alert and no distress, nontoxic in appearance  EYES: Eyes grossly normal to inspection, PERRL and conjunctivae and sclerae normal  HENT: normocephalic  NECK: supple with full ROM  RESP: lungs clear to auscultation - no rales, rhonchi or wheezes  CV: regular rate and rhythm, normal S1 S2, no S3 or S4, no murmur, click or rub, no peripheral edema   ABDOMEN: soft, nontender  MS: no gross musculoskeletal defects noted, no edema  No rash    Results for orders placed or performed in visit on 11/05/20 (from the past 24 hour(s))   Wet prep    Specimen: Vagina   Result Value Ref Range    Specimen Description Vagina     Wet Prep No Trichomonas seen     Wet Prep Clue cells seen  Moderate   (A)     Wet Prep No yeast seen     Wet Prep WBC'S seen  Few              Assessment & Plan   Problem List Items Addressed This Visit     None      Visit Diagnoses     BV (bacterial vaginosis)    -  Primary    Relevant Medications    metroNIDAZOLE (FLAGYL) 500 MG tablet    Unspecified symptoms and signs involving the genitourinary system        Relevant Orders    Wet prep (Completed)                  Patient Instructions   Take medication as directed.    Follow-up with your primary care provider next " week and as needed.    Indications for emergent return to emergency department discussed with patient, who verbalized good understanding and agreement.  Patient understands the limitations of today's evaluation.         Patient Education     Bacterial Vaginosis    You have a vaginal infection called bacterial vaginosis (BV). Both good and bad bacteria are present in a healthy vagina. BV occurs when these bacteria get out of balance. The number of bad bacteria increase. And the number of good bacteria decrease. Although BV is associated with sexual activity, it is not a sexually transmitted disease.  BV may or may not cause symptoms. If symptoms do occur, they can include:    Thin, gray, milky-white, or sometimes green discharge    Unpleasant odor or  fishy  smell    Itching, burning, or pain in or around the vagina  It is not known what causes BV, but certain factors can make the problem more likely. This can include:    Douching    Having sex with a new partner    Having sex with more than one partner  BV will sometimes go away on its own. But treatment is usually recommended. This is because untreated BV can increase the risk of more serious health problems such as:    Pelvic inflammatory disease (PID)     delivery (giving birth to a baby early if you re pregnant)    HIV and certain other sexually transmitted diseases (STDs)    Infection after surgery on the reproductive organs  Home care  General care    BV is most often treated with medicines called antibiotics. These may be given as pills or as a vaginal cream. If antibiotics are prescribed, be sure to use them exactly as directed. Also, be sure to complete all of the medicine, even if your symptoms go away.    Don't douche or having sex during treatment.    If you have sex with a female partner, ask your healthcare provider if she should also be treated.  Prevention    Don't douche.    Don't have sex. If you do have sex, then take steps to lower your  risk:  ? Use condoms when having sex.  ? Limit the number of sexual partners you have.  Follow-up care  Follow up with your healthcare provider, or as advised.  When to seek medical advice  Call your healthcare provider right away if:    You have a fever of 100.4 F (38 C) or higher, or as directed by your provider.    Your symptoms worsen, or they don t go away within a few days of starting treatment.    You have new pain in the lower belly or pelvic region.    You have side effects that bother you or a reaction to the pills or cream you re prescribed.    You or any partners you have sex with have new symptoms, such as a rash, joint pain, or sores.  [employer] last reviewed this educational content on 10/1/2017    4860-4684 The "[employer]. [address] [place] [address]. All rights reserved. This information is not intended as a substitute for professional medical care. Always follow your healthcare professional's instructions.           Patient Education     Vaginal Infection: Bacterial Vaginosis  Both good and bad bacteria are present in a healthy vagina. Bacterial vaginosis (BV) occurs when these bacteria get out of balance. The numbers of good bacteria decrease. This allows the numbers of bad bacteria to increase and cause BV. In most cases, BV is not a serious problem.  Causes of bacterial vaginosis  The cause of BV is not clear. Douching may lead to it. Having sex with a new partner or more than 1 partner makes it more likely.  Symptoms of bacterial vaginosis  Symptoms of BV vary for each woman. Some women have few symptoms or none at all. If symptoms are present, they can include:    Thin, milky white or gray or sometimes green discharge    Unpleasant  fishy  odor    Irritation, itching, and burning at opening of vagina which may indicate mixed vaginitis      Burning or irritation with sex or urination which may indicate mixed vaginitis  Diagnosing bacterial vaginosis  Your healthcare  provider will ask about your symptoms and health history. He or she will also do a pelvic exam. This is an exam of your vagina and cervix. A sample of vaginal fluid or discharge may be taken. This sample is checked for signs of BV.  Treating bacterial vaginosis  BV is often treated with antibiotics. They may be given in oral pill form or as a vaginal cream. To use these medicines:    Be sure to take all of your medicine, even if your symptoms go away.    If you re taking antibiotic pills, do not drink alcohol until you re finished with all of your medicine.    If you re using vaginal cream, apply it as directed. Be aware that the cream may make condoms and diaphragms less effective.    Call your healthcare provider if symptoms do not go away within 4 days of starting treatment. Also call if you have a reaction to the medicine.  Why treatment matters  Even if you have no symptoms or your symptoms are mild, BV should be treated. Untreated BV can lead to health problems such as:    Increased risk of  delivery if you re pregnant    Increased risk of complications after surgery on the reproductive organs    Possible increased risk of pelvic inflammatory disease (PID)  [employer] last reviewed this educational content on 3/1/2017    3326-2682 [employer]. [address] [place] [address], [place] [address]. All rights reserved. This information is not intended as a substitute for professional medical care. Always follow your healthcare professional's instructions.             Return in about 1 week (around 2020), or if symptoms worsen or fail to improve, for Follow up with your primary care provider.    MARISSA Harrison [facility]

## 2020-11-13 ENCOUNTER — OFFICE VISIT (OUTPATIENT)
Dept: URGENT CARE | Facility: URGENT CARE | Age: 60
End: 2020-11-13
Payer: COMMERCIAL

## 2020-11-13 VITALS
RESPIRATION RATE: 14 BRPM | TEMPERATURE: 97.9 F | HEART RATE: 86 BPM | DIASTOLIC BLOOD PRESSURE: 62 MMHG | SYSTOLIC BLOOD PRESSURE: 112 MMHG

## 2020-11-13 DIAGNOSIS — N89.8 VAGINAL ITCHING: ICD-10-CM

## 2020-11-13 DIAGNOSIS — N76.0 BV (BACTERIAL VAGINOSIS): Primary | ICD-10-CM

## 2020-11-13 DIAGNOSIS — B96.89 BV (BACTERIAL VAGINOSIS): Primary | ICD-10-CM

## 2020-11-13 DIAGNOSIS — T36.95XA ADVERSE REACTION TO ANTIBIOTIC: ICD-10-CM

## 2020-11-13 LAB
SPECIMEN SOURCE: ABNORMAL
WET PREP SPEC: ABNORMAL

## 2020-11-13 PROCEDURE — 87210 SMEAR WET MOUNT SALINE/INK: CPT | Performed by: PHYSICIAN ASSISTANT

## 2020-11-13 PROCEDURE — 99214 OFFICE O/P EST MOD 30 MIN: CPT | Performed by: PHYSICIAN ASSISTANT

## 2020-11-13 RX ORDER — CLINDAMYCIN HCL 300 MG
300 CAPSULE ORAL 2 TIMES DAILY
Qty: 14 CAPSULE | Refills: 0 | Status: SHIPPED | OUTPATIENT
Start: 2020-11-13 | End: 2020-11-20

## 2020-11-13 ASSESSMENT — ENCOUNTER SYMPTOMS
CHEST TIGHTNESS: 0
SHORTNESS OF BREATH: 0
EYE REDNESS: 0
FEVER: 0
WHEEZING: 0
VOMITING: 0
COUGH: 0
FATIGUE: 0
EYE PAIN: 0
CHILLS: 0
SORE THROAT: 0
RHINORRHEA: 0
PALPITATIONS: 0
NAUSEA: 0
BACK PAIN: 0
ARTHRALGIAS: 0
MYALGIAS: 0
UNEXPECTED WEIGHT CHANGE: 0
SINUS PRESSURE: 0
TROUBLE SWALLOWING: 0
DIARRHEA: 0
ABDOMINAL PAIN: 0

## 2020-11-13 ASSESSMENT — PAIN SCALES - GENERAL: PAINLEVEL: NO PAIN (0)

## 2020-11-13 NOTE — PROGRESS NOTES
SUBJECTIVE:   Astrid Santoro is a 60 year old female presenting with a chief complaint of   Chief Complaint   Patient presents with     Medication Problem     Started metroNIDAZOLE (FLAGYL) 500 MG tablet on 11/5/20 and finished 11/13/20 and now her face is puffy and she is itchy on hands and shoulders. She things this is related to the abx.      Vaginal Problem     Still itching, sore and some discharge. She does not think the abx worked for her BV.        She is an established patient of [place].    Vaginal problem   Onset of symptoms was 2 week(s) ago.  Course of illness is same.    Severity moderate  Current and Associated symptoms: vaginal itching and discharge   Treatment measures tried include metronidazole.  Predisposing factors include was diagnosed with bacterial vaginosis on 11/5/20.    Patient reports itching rash starting 2 days ago when she was still on metronidazole. She is concerned it is related to medication. No mouth swelling. No throat tightness. No difficulty breathing.     Review of Systems   Constitutional: Negative for chills, fatigue, fever and unexpected weight change.   HENT: Negative for congestion, ear pain, postnasal drip, rhinorrhea, sinus pressure, sore throat and trouble swallowing.    Eyes: Negative for pain, redness and visual disturbance.   Respiratory: Negative for cough, chest tightness, shortness of breath and wheezing.    Cardiovascular: Negative for chest pain and palpitations.   Gastrointestinal: Negative for abdominal pain, diarrhea, nausea and vomiting.   Genitourinary: Positive for vaginal discharge.   Musculoskeletal: Negative for arthralgias, back pain and myalgias.   Skin: Positive for rash.       Past Medical History:   Diagnosis Date     ASCUS with positive high risk HPV cervical 10/4/2017     Human papillomavirus in conditions classified elsewhere and of unspecified site      Lupus erythematosus      Other, mixed, or unspecified nondependent drug abuse, unspecified      Meth, Marijuana. Currently smoking marijuana.     Raynaud's syndrome      Family History   Problem Relation Age of Onset     Heart Disease Father      Hypertension Father      Alcohol/Drug Father      Diabetes Maternal Grandmother      Family History Negative Mother      Current Outpatient Medications   Medication Sig Dispense Refill     albuterol (VENTOLIN HFA) 108 (90 BASE) MCG/ACT Inhaler Inhale 2 puffs into the lungs every 6 hours 3 Inhaler 3     clindamycin (CLEOCIN) 300 MG capsule Take 1 capsule (300 mg) by mouth 2 times daily for 7 days 14 capsule 0     tiotropium (SPIRIVA RESPIMAT) 2.5 MCG/ACT inhalation aerosol Inhale 2 puffs into the lungs daily 12 g 3     TYLENOL EXTRA STRENGTH OR as directed       Social History     Tobacco Use     Smoking status: Current Every Day Smoker     Packs/day: 0.50     Years: 36.00     Pack years: 18.00     Types: Cigarettes     Smokeless tobacco: Never Used     Tobacco comment: cutting down,    Substance Use Topics     Alcohol use: Yes     Comment: 1-2 drinks per day ( 2 bottles)       OBJECTIVE  /62 (BP Location: Right arm, Patient Position: Sitting, Cuff Size: Adult Regular)   Pulse 86   Temp 97.9  F (36.6  C) (Tympanic)   Resp 14   LMP 02/27/2013   Breastfeeding No     Physical Exam  Constitutional:       Appearance: She is well-developed.   HENT:      Head: Normocephalic.      Right Ear: Tympanic membrane and ear canal normal.      Left Ear: Tympanic membrane and ear canal normal.      Mouth/Throat:      Pharynx: Oropharynx is clear.      Comments: No mouth, tongue, or lips swelling  Eyes:      Conjunctiva/sclera: Conjunctivae normal.      Pupils: Pupils are equal, round, and reactive to light.   Cardiovascular:      Rate and Rhythm: Normal rate.      Heart sounds: Normal heart sounds.   Pulmonary:      Effort: Pulmonary effort is normal.      Breath sounds: Normal breath sounds.      Comments: No difficulty breathing   Skin:     General: Skin is warm and  dry.      Comments: Erythematous rash on cheeks and around right eye. No lesions or blisters. Mild swelling around right eye. Mild erythematous rash on right wrist. No signs of infection. No urticarial type rash on trunk or extremities.    Psychiatric:         Behavior: Behavior normal.         Labs:  Results for orders placed or performed in visit on 11/13/20 (from the past 24 hour(s))   Wet prep    Specimen: Vagina   Result Value Ref Range    Specimen Description Vagina     Wet Prep No yeast seen     Wet Prep Few  Clue cells seen   (A)     Wet Prep No Trichomonas seen     Wet Prep Rare  WBC'S seen          X-Ray was not done.    ASSESSMENT:      ICD-10-CM    1. BV (bacterial vaginosis)  N76.0 clindamycin (CLEOCIN) 300 MG capsule    B96.89    2. Adverse reaction to antibiotic  T36.95XA    3. Vaginal itching  N89.8 Wet prep        Medical Decision Making:    Differential Diagnosis:  Allergic reaction, angioedema, hives  Bacterial vaginosis, vaginitis     Serious Comorbid Conditions:  Adult:  None    PLAN:    Bacterial vaginosis: wet prep shows clue cells after completing metronidazole. Will treat with clindamycin two times daily x 7 days.    Rash: started during last few days of metronidazole. Not getting any worse. Start antihistamine as needed for itching. Discussed in detail symptoms that would warrant emergent evaluation in the ED. Patient agrees with plan and will follow up as needed.      Followup:    If not improving or if condition worsens, follow up with your Primary Care Provider    There are no Patient Instructions on file for this visit.

## 2020-11-17 ENCOUNTER — OFFICE VISIT (OUTPATIENT)
Dept: FAMILY MEDICINE | Facility: CLINIC | Age: 60
End: 2020-11-17
Payer: COMMERCIAL

## 2020-11-17 ENCOUNTER — TELEPHONE (OUTPATIENT)
Dept: FAMILY MEDICINE | Facility: CLINIC | Age: 60
End: 2020-11-17

## 2020-11-17 VITALS
WEIGHT: 131 LBS | RESPIRATION RATE: 18 BRPM | SYSTOLIC BLOOD PRESSURE: 110 MMHG | DIASTOLIC BLOOD PRESSURE: 62 MMHG | HEART RATE: 88 BPM | HEIGHT: 62 IN | BODY MASS INDEX: 24.11 KG/M2 | TEMPERATURE: 98.8 F

## 2020-11-17 DIAGNOSIS — T78.40XA ALLERGIC REACTION, INITIAL ENCOUNTER: Primary | ICD-10-CM

## 2020-11-17 PROCEDURE — 99213 OFFICE O/P EST LOW 20 MIN: CPT | Performed by: NURSE PRACTITIONER

## 2020-11-17 RX ORDER — PREDNISONE 20 MG/1
TABLET ORAL
Qty: 20 TABLET | Refills: 0 | Status: SHIPPED | OUTPATIENT
Start: 2020-11-17 | End: 2020-12-15

## 2020-11-17 RX ORDER — HYDROXYZINE HYDROCHLORIDE 25 MG/1
25 TABLET, FILM COATED ORAL EVERY 6 HOURS PRN
Qty: 30 TABLET | Refills: 0 | Status: SHIPPED | OUTPATIENT
Start: 2020-11-17 | End: 2020-12-15

## 2020-11-17 ASSESSMENT — MIFFLIN-ST. JEOR: SCORE: 1131.43

## 2020-11-17 NOTE — PROGRESS NOTES
"Subjective     Astrid Santoro is a 60 year old female who presents to clinic today for the following health issues:    HPI         Rash  Onset/Duration: about four days  Description  Location: eyes, hands  Character: burning, red bumps, swelling  Itching: moderate  Intensity:  moderate  Progression of Symptoms:  worsening  Accompanying signs and symptoms:   Fever: no  Body aches or joint pain: no  Sore throat symptoms: no  Recent cold symptoms: no  History:           Previous episodes of similar rash: metronidazole  New exposures:  None  Recent travel: no  Exposure to similar rash: no  Precipitating or alleviating factors: none  Therapies tried and outcome: allergy medication        Review of Systems   Constitutional, HEENT, cardiovascular, pulmonary, gi and gu systems are negative, except as otherwise noted.      Objective    LMP 02/27/2013   There is no height or weight on file to calculate BMI.   /62 (BP Location: Right arm, Cuff Size: Adult Regular)   Pulse 88   Temp 98.8  F (37.1  C) (Tympanic)   Resp 18   Ht 1.581 m (5' 2.25\")   Wt 59.4 kg (131 lb)   LMP 02/27/2013   BMI 23.77 kg/m      Physical Exam   GENERAL: healthy, alert and no distress  EYES: Eyes grossly normal to inspection, PERRL and conjunctivae and sclerae normal  HENT: ear canals and TM's normal, nose and mouth without ulcers or lesions  HENT: swelling to face   NECK: no adenopathy, no asymmetry, masses, or scars and thyroid normal to palpation  RESP: lungs clear to auscultation - no rales, rhonchi or wheezes  CV: regular rate and rhythm, normal S1 S2, no S3 or S4, no murmur, click or rub, no peripheral edema and peripheral pulses strong  MS: no gross musculoskeletal defects noted, no edema  SKIN: no suspicious lesions or rashes  NEURO: Normal strength and tone, mentation intact and speech normal  PSYCH: mentation appears normal, affect normal/bright    No results found for any visits on 11/17/20.        Assessment & Plan     Astrid " was seen today for rash.    Diagnoses and all orders for this visit:    Allergic reaction, initial encounter  -     predniSONE (DELTASONE) 20 MG tablet; Take 3 tabs by mouth daily x 1 days, then 2 tabs daily x 4 days, then 1 tab daily x 4 days, then 1/2 tab daily x 3 days.  -     hydrOXYzine (ATARAX) 25 MG tablet; Take 1 tablet (25 mg) by mouth every 6 hours as needed for itching (insomina)            See Patient Instructions    No follow-ups on file.    MARISSA Thakkar [facility]

## 2020-11-17 NOTE — PATIENT INSTRUCTIONS
Patient Education     Medicine Reaction: Allergic  You are having an allergic reaction to a medicine you have taken. This may cause an itchy rash and sometimes swelling of various parts of the body. It could also cause trouble swallowing or breathing. The rash may take a few hours or up to 2 weeks to go away. In the future, remember to tell your healthcare provider about your allergy to this medicine so that medicines of this type won't be used again.   Any medicine can cause an allergic reaction. But most allergic reactions are caused by:     Penicillin and related medicines    Antibiotics containing sulfonamides (sulfa ,medicines)    Aspirin    Ibuprofen or other nonsteroidal anti-inflammatory drugs (NSAIDs)    Seizure medicines  Vaccines may also trigger allergies. People whose parents or siblings have allergies are at a higher risk of developing a medicine allergy. Allergy testing may sometimes be needed to figure out the cause.   Symptoms may occur within minutes, hours, or even weeks after exposure to the medicine. It can be a mild or severe reaction, or potentially life threatening. Most of us think of allergic reactions when we have a rash or itchy skin. Symptoms can include:     Rash, hives, redness, welts, blisters    Itching, burning, stinging, pain    Dry, flaky, cracking, scaly skin    Belly (abdominal) cramps or nausea or stomach pain    Fever. Sometimes fever is the only symptom of a medicine reaction. In older adults, the risk of fever increases with the number of medicines the person takes.  More severe symptoms include:    Swelling of the face or lips, or drooling    Trouble swallowing, feeling like your throat is closing    Trouble breathing, wheezing    Hoarse voice or trouble speaking    Severe nausea or vomiting or diarrhea      Feeling faint or lightheaded, rapid heart rate    Blistering of the skin or ulcers in the mouth or on the genitals  Home care    The goal of treatment is to help  relieve the symptoms and get you feeling better. Mild to medium medicine reactions usually respond quickly to taking antihistamines or steroids and stopping the medicine. The rash will usually fade over several days. But it can sometimes last a couple of weeks. Over the next couple of days, there may be times when it gets a little worse and then better again. Here are some things to do:     Dispose of the medicine safely and don t take it again. The next reaction could be the same or worse.    Call your healthcare provider to discuss adding this medicine's allergy reaction to your electronic medical record.    When getting a new medicine, always tell the healthcare provider that you are allergic to this medicine. Make certain the provider writes it down in your medical record.    Don't wear tight clothing and stay way from anything that heats up your skin (hot showers or baths, direct sunlight). Heat will make itching worse.    An ice pack will relieve local areas of intense itching and redness. To make an ice pack, put ice cubes in a plastic bag that seals at the top. Wrap the bag in a clean, thin towel or cloth. Don t put ice directly on the skin.    To help prevent an infection, don't scratch the affected area. Scratching may worsen the reaction. It can damage your skin and lead to an infection. Always check the affected site for signs of an infection.    Your provider may give you a prescription antihistamine.    If you are not given a prescription antihistamine, oral diphenhydramine is an over-the-counter antihistamine available at pharmacies and grocery stores. This may be used to reduce itching if large areas of the skin are involved. This antihistamine may make you sleepy, so be careful using it in the daytime or when going to school, working, or driving. Note: Don t use diphenhydramine if you have glaucoma or if you are a man with trouble urinating due to an enlarged prostate. There are other antihistamines  that cause less drowsiness and are a good choice for daytime use. Ask your pharmacist or healthcare provider for suggestions.    Don't use diphenhydramine cream on your skin. It can cause a worse skin reaction for some people.    Contact your healthcare provider and ask what can be used on the affected area to help decrease the itching.  Follow-up care  Follow up with your healthcare provider, or as advised, if your symptoms do not continue to improve or they get worse.   Call 911  Call 911 if any of these occur:     Shortness of breath    Cool, moist, pale skin    Swelling in the face, eyelids, mouth, tongue, or lips    Drooling    Trouble breathing or swallowing, wheezing    New or worsening swelling in the mouth, throat, or tongue    Hoarse voice or trouble speaking     Fainting or loss of consciousness    Rapid heart rate    Feeling of dizziness or weakness or a sudden drop in blood pressure    Feeling of doom    Feeling lightheaded    Severe nausea, vomiting, or diarrhea  When to seek medical advice  Call your healthcare provider or get medical care right away if any of these occur:     Continuing or recurring symptoms    Nausea, abdominal cramps, or stomach pain    Spreading areas of itching, redness, or swelling    Blistering of the skin, or sores or ulcers in the mouth or on the genitals    Signs of infection:  ? Spreading redness  ? Increased pain or swelling  ? Fever of 100.4 F (38 C) or above lasting for 24 to 48 hours, or as directed by your provider  ? Fluid or colored drainage from the affected area  [employer] last reviewed this educational content on 11/1/2019 2000-2020 [employer]. [address] [place] [address], [place] [address]. All rights reserved. This information is not intended as a substitute for professional medical care. Always follow your healthcare professional's instructions.

## 2020-11-17 NOTE — LETTER
[facility]  [address] [place] [id number]  Phone: [phone]  Fax: [phone]    November 17, 2020        Astrid Santoro  [address] [facility] [address]          To whom it may concern:    RE: Astrid Santoro    Patient was seen and treated today at our clinic and missed work.  Patient may return to work 11/20/2020  with the following:  No working or lifting restrictions    Please contact me for questions or concerns.      Sincerely,        MARISSA Thakkar CNP

## 2020-11-17 NOTE — TELEPHONE ENCOUNTER
Reason for call:  Patient reporting a symptom    Symptom or request: swelling of her hands & burning itching red rash. Pt said her eyes are so swollen.  Pt was into UC 11/13/20 for this issue. Pt said it is getting worse.   Pt has been taking her Allergy Medication.     Phone Number patient can be reached at:  Home number on file [phone] (home)    Best Time:  Any Time      Can we leave a detailed message on this number:  YES    Call taken on 11/17/2020 at 9:19 AM by Martina Taylor

## 2020-12-15 ENCOUNTER — OFFICE VISIT (OUTPATIENT)
Dept: FAMILY MEDICINE | Facility: CLINIC | Age: 60
End: 2020-12-15
Payer: COMMERCIAL

## 2020-12-15 VITALS
WEIGHT: 132 LBS | SYSTOLIC BLOOD PRESSURE: 110 MMHG | BODY MASS INDEX: 24.29 KG/M2 | HEART RATE: 70 BPM | HEIGHT: 62 IN | DIASTOLIC BLOOD PRESSURE: 68 MMHG | RESPIRATION RATE: 14 BRPM | TEMPERATURE: 97.3 F

## 2020-12-15 DIAGNOSIS — Z12.4 CERVICAL CANCER SCREENING: ICD-10-CM

## 2020-12-15 DIAGNOSIS — Z00.00 ROUTINE GENERAL MEDICAL EXAMINATION AT A HEALTH CARE FACILITY: Primary | ICD-10-CM

## 2020-12-15 DIAGNOSIS — N89.8 VAGINAL DISCHARGE: ICD-10-CM

## 2020-12-15 DIAGNOSIS — Z12.11 COLON CANCER SCREENING: ICD-10-CM

## 2020-12-15 DIAGNOSIS — J44.9 CHRONIC OBSTRUCTIVE PULMONARY DISEASE, UNSPECIFIED COPD TYPE (H): ICD-10-CM

## 2020-12-15 DIAGNOSIS — Z11.51 SCREENING FOR HUMAN PAPILLOMAVIRUS: ICD-10-CM

## 2020-12-15 LAB
SPECIMEN SOURCE: ABNORMAL
WET PREP SPEC: ABNORMAL

## 2020-12-15 PROCEDURE — G0145 SCR C/V CYTO,THINLAYER,RESCR: HCPCS | Performed by: NURSE PRACTITIONER

## 2020-12-15 PROCEDURE — 99213 OFFICE O/P EST LOW 20 MIN: CPT | Mod: 25 | Performed by: NURSE PRACTITIONER

## 2020-12-15 PROCEDURE — 87210 SMEAR WET MOUNT SALINE/INK: CPT | Performed by: NURSE PRACTITIONER

## 2020-12-15 PROCEDURE — 87624 HPV HI-RISK TYP POOLED RSLT: CPT | Performed by: NURSE PRACTITIONER

## 2020-12-15 PROCEDURE — 99396 PREV VISIT EST AGE 40-64: CPT | Performed by: NURSE PRACTITIONER

## 2020-12-15 RX ORDER — ALBUTEROL SULFATE 90 UG/1
2 AEROSOL, METERED RESPIRATORY (INHALATION) EVERY 6 HOURS
Qty: 3 INHALER | Refills: 3 | Status: SHIPPED | OUTPATIENT
Start: 2020-12-15 | End: 2021-06-14

## 2020-12-15 ASSESSMENT — ENCOUNTER SYMPTOMS
HEMATURIA: 0
PALPITATIONS: 0
DYSURIA: 0
HEADACHES: 0
CHILLS: 0
FREQUENCY: 0
MYALGIAS: 0
EYE PAIN: 0
HEARTBURN: 0
WEAKNESS: 0
DIZZINESS: 0
HEMATOCHEZIA: 0
ABDOMINAL PAIN: 0
BREAST MASS: 0
JOINT SWELLING: 0
ARTHRALGIAS: 1
SHORTNESS OF BREATH: 0
SORE THROAT: 0
NAUSEA: 0
COUGH: 0
NERVOUS/ANXIOUS: 0
FEVER: 0
CONSTIPATION: 0
DIARRHEA: 0
PARESTHESIAS: 0

## 2020-12-15 ASSESSMENT — MIFFLIN-ST. JEOR: SCORE: 1135.97

## 2020-12-15 NOTE — PROGRESS NOTES
SUBJECTIVE:   CC: Astrid Santoro is an 60 year old woman who presents for preventive health visit.       Patient has been advised of split billing requirements and indicates understanding: Yes  Healthy Habits:     Getting at least 3 servings of Calcium per day:  Yes    Bi-annual eye exam:  NO    Dental care twice a year:  NO    Sleep apnea or symptoms of sleep apnea:  None    Diet:  Carbohydrate counting    Frequency of exercise:  4-5 days/week    Duration of exercise:  15-30 minutes    Taking medications regularly:  Yes    Medication side effects:  None    PHQ-2 Total Score: 0    Additional concerns today:  Yes  Smoker.  Not ready to quit.  Odorous vaginal discharge.  Sexually active 1 partner.  No recent sexual activity  Postmenopausal        Today's PHQ-2 Score:   PHQ-2 ( 1999 [employer]) 12/15/2020   Q1: Little interest or pleasure in doing things 0   Q2: Feeling down, depressed or hopeless 0   PHQ-2 Score 0   Q1: Little interest or pleasure in doing things Not at all   Q2: Feeling down, depressed or hopeless Not at all   PHQ-2 Score 0       No FLU SHOT TODAY  Pneumonia vaccine  No shingles vaccine  Mammogram due    COPD SMOKER for 45 yrs.   COPD is stable per her report  Not ready to quit smoking information given  Eye exam NO  Dental exam NO    Lab Results   Component Value Date    PAP ASC-US 09/27/2017    PAP NIL 08/21/2009    PAP NIL 11/19/2007       Abuse: Current or Past (Physical, Sexual or Emotional) - No  Do you feel safe in your environment? Yes    Have you ever done Advance Care Planning? (For example, a Health Directive, POLST, or a discussion with a medical provider or your loved ones about your wishes): Yes, patient states has an Advance Care Planning document and will bring a copy to the clinic.    Social History     Tobacco Use     Smoking status: Current Every Day Smoker     Packs/day: 0.50     Years: 36.00     Pack years: 18.00     Types: Cigarettes     Smokeless tobacco: Never Used     Tobacco  comment: cutting down,    Substance Use Topics     Alcohol use: Yes     Comment: 1-2 drinks per day ( 2 bottles)     If you drink alcohol do you typically have >3 drinks per day or >7 drinks per week? No    Alcohol Use 12/15/2020   Prescreen: >3 drinks/day or >7 drinks/week? Yes   Prescreen: >3 drinks/day or >7 drinks/week? -   AUDIT SCORE  10     AUDIT - Alcohol Use Disorders Identification Test - Reproduced from the World Health Organization Audit 2001 (Second Edition) 12/15/2020   1.  How often do you have a drink containing alcohol? 4 or more times a week   2.  How many drinks containing alcohol do you have on a typical day when you are drinking? 5 or 6   3.  How often do you have five or more drinks on one occasion? Daily or almost daily   4.  How often during the last year have you found that you were not able to stop drinking once you had started? Never   5.  How often during the last year have you failed to do what was normally expected of you because of drinking? Never   6.  How often during the last year have you needed a first drink in the morning to get yourself going after a heavy drinking session? Never   7.  How often during the last year have you had a feeling of guilt or remorse after drinking? Never   8.  How often during the last year have you been unable to remember what happened the night before because of your drinking? Never   9.  Have you or someone else been injured because of your drinking? No   10. Has a relative, friend, doctor or other health care worker been concerned about your drinking or suggested you cut down? No   TOTAL SCORE 10       Reviewed orders with patient.  Reviewed health maintenance and updated orders accordingly - Yes  BP Readings from Last 3 Encounters:   12/15/20 110/68   11/17/20 110/62   11/13/20 112/62    Wt Readings from Last 3 Encounters:   12/15/20 59.9 kg (132 lb)   11/17/20 59.4 kg (131 lb)   11/05/20 59.8 kg (131 lb 12.8 oz)                    Mammogram  Screening: Patient over age 50, mutual decision to screen reflected in health maintenance.    Pertinent mammograms are reviewed under the imaging tab.  History of abnormal Pap smear:   Last 3 Pap and HPV Results:   PAP / HPV Latest Ref Rng & Units 2007   PAP - ASC-US(A) NIL NIL   HPV 16 DNA NEG:Negative Negative - -   HPV 18 DNA NEG:Negative Negative - -   OTHER HR HPV NEG:Negative Positive(A) - -     PAP / HPV Latest Ref Rng & Units 2007   PAP - ASC-US(A) NIL NIL   HPV 16 DNA NEG:Negative Negative - -   HPV 18 DNA NEG:Negative Negative - -   OTHER HR HPV NEG:Negative Positive(A) - -     Reviewed and updated as needed this visit by clinical staff                 Reviewed and updated as needed this visit by Provider                Past Medical History:   Diagnosis Date     ASCUS with positive high risk HPV cervical 10/4/2017     Human papillomavirus in conditions classified elsewhere and of unspecified site      Lupus erythematosus      Other, mixed, or unspecified nondependent drug abuse, unspecified     Meth, Marijuana. Currently smoking marijuana.     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   HENT: Negative for congestion, ear pain, hearing loss and sore throat.    Eyes: Negative for pain and visual disturbance.   Respiratory: Negative for cough and shortness of breath.    Cardiovascular: Negative for chest pain,  palpitations and peripheral edema.   Gastrointestinal: Negative for abdominal pain, constipation, diarrhea, heartburn, hematochezia and nausea.   Breasts:  Negative for tenderness, breast mass and discharge.   Genitourinary: Negative for dysuria, frequency, genital sores, hematuria, pelvic pain, urgency, vaginal bleeding and vaginal discharge.   Musculoskeletal: Positive for arthralgias. Negative for joint swelling and myalgias.   Skin: Negative for rash.   Neurological: Negative for dizziness, weakness, headaches and paresthesias.   Psychiatric/Behavioral: Negative for mood changes. The patient is not nervous/anxious.           OBJECTIVE:   LMP 02/27/2013   Physical Exam  GENERAL: healthy, alert and no distress  EYES: Eyes grossly normal to inspection, PERRL and conjunctivae and sclerae normal  HENT: ear canals and TM's normal, nose and mouth without ulcers or lesions  NECK: no adenopathy, no asymmetry, masses, or scars and thyroid normal to palpation  RESP: lungs clear to auscultation - no rales, rhonchi or wheezes  BREAST: normal without masses, tenderness or nipple discharge and no palpable axillary masses or adenopathy  CV: regular rate and rhythm, normal S1 S2, no S3 or S4, no murmur, click or rub, no peripheral edema and peripheral pulses strong  ABDOMEN: soft, nontender, no hepatosplenomegaly, no masses and bowel sounds normal   (female): normal female external genitalia, normal urethral meatus, vaginal mucosa pale pink, moist, well rugated, and normal cervix/adnexa/uterus without masses or discharge  MS: no gross musculoskeletal defects noted, no edema.  Clubbing of the fingers present  SKIN: no suspicious lesions or rashes  NEURO: Normal strength and tone, mentation intact and speech normal  PSYCH: mentation appears normal, affect normal/bright    Diagnostic Test Results:  Labs reviewed in Epic  Results for orders placed or performed in visit on 12/15/20   Wet prep     Status: Abnormal    Specimen:  "Vagina   Result Value Ref Range    Specimen Description Vagina     Wet Prep No Trichomonas seen     Wet Prep Clue cells seen  Few   (A)     Wet Prep No yeast seen     Wet Prep WBC'S seen  Few          ASSESSMENT/PLAN:   Astrid was seen today for physical.    Diagnoses and all orders for this visit:    Routine general medical examination at a health care facility    Chronic obstructive pulmonary disease, unspecified COPD type (H)  -     albuterol (VENTOLIN HFA) 108 (90 Base) MCG/ACT inhaler; Inhale 2 puffs into the lungs every 6 hours  -     tiotropium (SPIRIVA RESPIMAT) 2.5 MCG/ACT inhaler; Inhale 2 puffs into the lungs daily    Vaginal discharge  -     Wet prep    Cervical cancer screening  -     Pap imaged thin layer screen with HPV - recommended age 30 - 65    Screening for human papillomavirus  -     HPV High Risk Types DNA Cervical    Colon cancer screening  -     Fecal colorectal cancer screen FIT; Future    Flu shot declined.  Pneumococcal declined.  Shingles vaccine declined.  Colonoscopy declined.  Will plan to do FIT cards when we have some available here in clinic.  We will contact with results of her labs as they become available.  Smoking cessation strongly encouraged.  Not interested.  Continue Ventolin 2 puffs every 4 hours as needed wheeze cough shortness of breath  Spiriva 2 puffs daily      Patient has been advised of split billing requirements and indicates understanding: Yes  COUNSELING:  Reviewed preventive health counseling, as reflected in patient instructions    Estimated body mass index is 23.77 kg/m  as calculated from the following:    Height as of 11/17/20: 1.581 m (5' 2.25\").    Weight as of 11/17/20: 59.4 kg (131 lb).        She reports that she has been smoking cigarettes. She has a 18.00 pack-year smoking history. She has never used smokeless tobacco.  Tobacco Cessation Action Plan:   Information offered: Patient not interested at this time      Counseling Resources:  ATP IV " Guidelines  Pooled Cohorts Equation Calculator  Breast Cancer Risk Calculator  BRCA-Related Cancer Risk Assessment: FHS-7 Tool  FRAX Risk Assessment  ICSI Preventive Guidelines  Dietary Guidelines for Americans, 2010  USDA's MyPlate  ASA Prophylaxis  Lung CA Screening  Call or return to the clinic with any worsening of symptoms or no resolution. Patient/Parent verbalized understanding and is in agreement. Medication side effects reviewed.   Current Outpatient Medications   Medication Sig Dispense Refill     albuterol (VENTOLIN HFA) 108 (90 Base) MCG/ACT inhaler Inhale 2 puffs into the lungs every 6 hours 3 Inhaler 3     tiotropium (SPIRIVA RESPIMAT) 2.5 MCG/ACT inhaler Inhale 2 puffs into the lungs daily 12 g 3     TYLENOL EXTRA STRENGTH OR as directed       Chart documentation with Dragon Voice recognition Software. Although reviewed after completion, some words and grammatical errors may remain.      MARISSA Correa [facility]

## 2020-12-16 DIAGNOSIS — N76.0 BV (BACTERIAL VAGINOSIS): Primary | ICD-10-CM

## 2020-12-16 DIAGNOSIS — B96.89 BV (BACTERIAL VAGINOSIS): Primary | ICD-10-CM

## 2020-12-16 RX ORDER — CLINDAMYCIN PHOSPHATE 20 MG/G
1 CREAM VAGINAL AT BEDTIME
Qty: 40 G | Refills: 1 | Status: SHIPPED | OUTPATIENT
Start: 2020-12-16 | End: 2021-03-17

## 2020-12-17 LAB
COPATH REPORT: NORMAL
PAP: NORMAL

## 2020-12-21 LAB
FINAL DIAGNOSIS: ABNORMAL
HPV HR 12 DNA CVX QL NAA+PROBE: POSITIVE
HPV16 DNA SPEC QL NAA+PROBE: NEGATIVE
HPV18 DNA SPEC QL NAA+PROBE: NEGATIVE
SPECIMEN DESCRIPTION: ABNORMAL
SPECIMEN SOURCE CVX/VAG CYTO: ABNORMAL

## 2020-12-23 ENCOUNTER — PATIENT OUTREACH (OUTPATIENT)
Dept: FAMILY MEDICINE | Facility: CLINIC | Age: 60
End: 2020-12-23

## 2020-12-24 NOTE — TELEPHONE ENCOUNTER
"9/27/2017 Pap: ASCUS, +HR HPV \"other\" (neg 16/18). Plan [place]-  10/30/17 [facility] Bx & [facility] - negative. Plan cotest in 1 year.   12/6/18 Lost to follow-up for pap tracking  12/15/20 NIL, + HR HPV (not 16 or 18). Plan cotest in 1 year due bef 12/15/21.  "

## 2021-01-02 DIAGNOSIS — Z12.11 COLON CANCER SCREENING: ICD-10-CM

## 2021-01-02 PROCEDURE — 82274 ASSAY TEST FOR BLOOD FECAL: CPT | Performed by: NURSE PRACTITIONER

## 2021-01-04 LAB — HEMOCCULT STL QL IA: NEGATIVE

## 2021-01-12 ENCOUNTER — OFFICE VISIT (OUTPATIENT)
Dept: FAMILY MEDICINE | Facility: CLINIC | Age: 61
End: 2021-01-12
Payer: COMMERCIAL

## 2021-01-12 ENCOUNTER — ANCILLARY PROCEDURE (OUTPATIENT)
Dept: GENERAL RADIOLOGY | Facility: CLINIC | Age: 61
End: 2021-01-12
Attending: NURSE PRACTITIONER
Payer: COMMERCIAL

## 2021-01-12 VITALS
WEIGHT: 133.6 LBS | HEART RATE: 72 BPM | DIASTOLIC BLOOD PRESSURE: 62 MMHG | SYSTOLIC BLOOD PRESSURE: 110 MMHG | BODY MASS INDEX: 24.24 KG/M2

## 2021-01-12 DIAGNOSIS — M25.512 ACUTE PAIN OF LEFT SHOULDER: ICD-10-CM

## 2021-01-12 DIAGNOSIS — M54.2 CERVICALGIA: ICD-10-CM

## 2021-01-12 DIAGNOSIS — R22.32 SUBCUTANEOUS MASS OF LEFT FOREARM: ICD-10-CM

## 2021-01-12 DIAGNOSIS — M54.6 ACUTE RIGHT-SIDED THORACIC BACK PAIN: ICD-10-CM

## 2021-01-12 DIAGNOSIS — M54.2 CERVICALGIA: Primary | ICD-10-CM

## 2021-01-12 PROCEDURE — 73030 X-RAY EXAM OF SHOULDER: CPT | Mod: LT | Performed by: RADIOLOGY

## 2021-01-12 PROCEDURE — 99214 OFFICE O/P EST MOD 30 MIN: CPT | Performed by: NURSE PRACTITIONER

## 2021-01-12 PROCEDURE — 72040 X-RAY EXAM NECK SPINE 2-3 VW: CPT | Performed by: RADIOLOGY

## 2021-01-12 PROCEDURE — 72072 X-RAY EXAM THORAC SPINE 3VWS: CPT | Performed by: RADIOLOGY

## 2021-01-12 RX ORDER — TIZANIDINE 2 MG/1
2 TABLET ORAL 3 TIMES DAILY
Qty: 45 TABLET | Refills: 1 | Status: SHIPPED | OUTPATIENT
Start: 2021-01-12 | End: 2021-12-14

## 2021-01-12 RX ORDER — PREDNISONE 20 MG/1
40 TABLET ORAL DAILY
Qty: 10 TABLET | Refills: 0 | Status: SHIPPED | OUTPATIENT
Start: 2021-01-12 | End: 2021-01-17

## 2021-01-12 ASSESSMENT — PAIN SCALES - GENERAL: PAINLEVEL: SEVERE PAIN (6)

## 2021-01-12 NOTE — PATIENT INSTRUCTIONS
Patient Education     Neck Problems: Relieving Your Symptoms    The first goal of treatment is to relieve your symptoms. Your healthcare provider may recommend self-care treatments. These include resting, applying ice and heat, taking medicine, and doing exercises. Your healthcare provider may also recommend that you see a physical therapist who can teach you ways to care for and strengthen your neck.  Self-care treatments  Pain can end quickly or last a while. Either way, you ll want relief as soon as possible. Your healthcare provider can tell you which treatments to do at home to help relieve your pain:    Lying down for a short time takes pressure from the head off the neck.    Ice and heat can help reduce pain. To bring down swelling, rest an ice pack wrapped in a thin towel on your neck for 10 to 15 minutes. To relax sore muscles, apply a warm, wet towel to the area. Or you can take a warm bath or shower.    Over-the-counter medicines, such as ibuprofen, naproxen, and aspirin, can help reduce pain and swelling. Acetaminophen can help relieve pain. Use these only as directed.    Exercises can relax muscles and ease stiffness. To prepare, drape a warm, wet towel around your neck and shoulders for 5 minutes. Remove the towel. Then do any exercises recommended to you by your healthcare provider.  Physical therapy  If self-care treatments aren t helping relieve neck pain, your healthcare provider may suggest physical therapy. Physical therapy is done by a specialist trained to treat injuries and musculoskeletal disorders. Your physical therapist (PT) will teach you how to strengthen muscles, improve the spine s alignment, and help you move properly. Treatment methods used in physical therapy may include:    Heat. A special heating pad called a neck pack may be applied to your neck.    Exercises. Your PT will teach you exercises to help strengthen your neck and improve its range of motion.    Joint  mobilization. The PT gently moves your vertebrae to help restore motion in your neck joints and reduce neck pain.    Soft tissue mobilization. The PT massages and stretches the muscles in your neck and shoulders.    Electrical stimulation. Electrical impulses are sent into your neck. This helps reduce soreness and inflammation.    Education in body mechanics. The PT shows you ways to position and move your body that protect the neck.  Other treatments  If physical therapy doesn t relieve your neck pain, your healthcare provider may suggest other treatments. For example, medicines or injections can help relieve pain and swelling. In some cases, surgery may be needed to treat neck problems.  [employer] last reviewed this educational content on 10/1/2017    1045-1995 [employer]. [address] [place] [address], [place] [address]. All rights reserved. This information is not intended as a substitute for professional medical care. Always follow your healthcare professional's instructions.           Patient Education     Acute Pain, Uncertain Cause  Pain can be caused by many conditions that range from very minor to very serious. In some cases, though, pain comes and goes with no apparent cause.   We were not able to find the exact cause for your pain. At this time there is no sign of any serious illness causing your pain. More tests may be needed to determine the cause. In many cases, pain like this goes away by itself.   Home care  Take any medicines as prescribed. If another medicine was not prescribed for pain, you can take an over-the-counter pain medicine such as ibuprofen or acetaminophen. Use these as directed on the label.     Follow-up care  Follow up with your healthcare provider or our staff as directed.  When to seek medical advice  Call your healthcare provider for any of the following:    Pain changes in pattern    Pain doesn't lessen or gets worse    New symptoms appear    Fever of 100.4 F (38 C) or  higher, or as directed by your healthcare provider  [employer] last reviewed this educational content on 4/1/2018 2000-2020 [employer]. [address] [facility], [place] [address]. All rights reserved. This information is not intended as a substitute for professional medical care. Always follow your healthcare professional's instructions.           Patient Education     Protecting Your Neck: Posture and Body Mechanics     Using good posture while lifting can help prevent pain or injury.   Protecting your neck from injuries and pain involves practicing good posture and body mechanics. This may mean correcting bad habits you have related to the way you hold and move your body. The tips below can help you improve your posture and body mechanics.  What is posture and why does it matter?  Posture is the way you hold your body. For many of us, this means hunching over, thrusting the chin forward, and slouching the shoulders. But this kind of poor posture keeps muscles from properly supporting the neck and puts stress on muscles, disks, ligaments, and joints in your neck. As a result, injury and pain can happen.  How is your posture?  Use a full-length mirror to check your posture. To begin, stand normally. Then slowly back up against a wall. Is there space between your head and the wall? Do you slouch your shoulders? Is your chin pointing up or down? All these can lead to neck tension and pain.  Improving your posture  Follow these steps to improve your posture:    Pull your shoulders back.    Think of the ears, shoulders, and hips as a series of dots. Now, adjust your body to connect the dots in a straight line.    Keep your chin level.  What are body mechanics and why do they matter?  The way you move and position your body during daily activities is called body mechanics. Good body mechanics help protect the neck. This means learning the right ways to stand, sit, and even sleep. So, do what s best for  your neck and practice good body mechanics.  Standing   To protect your neck while standing:    Carry objects close to your body.    Keep your ears and shoulders in a line while standing or walking.    To lower yourself, bend at the knees with a straight back. Do this instead of looking down and reaching for objects.    Work at eye level. Don t reach above your head or tilt your head back.  Sitting   To protect your neck while sitting:    Set up your workstation so your monitor is at eye level. Also, use a document robertson when viewing papers or books.    Keep your knees at or slightly below the level of your hips.    Sit up straight, with feet flat on the floor. If your feet don t touch the floor, use a footrest.    Avoid sitting or driving for long periods. Take breaks often.  Sleeping   To protect your neck while sleeping:    Sleep on your back with a pillow under your knees or on your side with a pillow between bent knees. This helps align the spine.    Avoid using pillows that are too high or too low. Instead, use a neck roll or pillow under your neck while you sleep to keep the neck straight.    Sleep on a mattress that supports you.  [employer] last reviewed this educational content on 10/1/2017    4897-1171 [employer]. [address] [place] [address]. All rights reserved. This information is not intended as a substitute for professional medical care. Always follow your healthcare professional's instructions.           Patient Education     Shoulder Pain with Uncertain Cause   Shoulder pain can have many causes. Pain often comes from the structures that surround the shoulder joint. These are the joint capsule, ligaments, tendons, muscles, and bursa. Pain can also come from cartilage in the joint. Cartilage can become worn out or injured. It s important to know what s causing your pain so the healthcare provider can use the correct treatment. But sometimes it s difficult to find the exact  cause of shoulder pain. You may need to see a specialist (orthopedist). You may also need special tests such as a CT scan or MRI. The provider may need to use special tools to look inside the joint (arthroscopy).  Shoulder pain can be treated with a sling or a device that keeps your shoulder from moving. You can take an anti-inflammatory medicine such as ibuprofen to ease pain. You may need to do special shoulder exercises. Follow up with a specialist if the pain is severe or doesn t go away after a few weeks.  Home care  Follow these tips when caring for yourself at home:    If a sling was given to you, leave it in place for the time advised by your healthcare provider. If you aren t sure how long to wear it, ask for advice. If the sling becomes loose, adjust it so that your forearm is level with the ground. Your shoulder should feel well supported.    Put an ice pack on the injured area for 20 minutes every 1 to 2 hours the first day. You can make your own ice pack by putting ice cubes in a plastic bag. Wrap the bag in a thin towel. Continue with ice packs 3 to 4 times a day for the next 2 days. Then use the pack as needed to ease pain and swelling.    You may use acetaminophen or ibuprofen to control pain, unless another pain medicine was prescribed. If you have chronic liver or kidney disease, talk with your healthcare provider before using these medicines. Also talk with your provider if you ve ever had a stomach ulcer or digestive bleeding.    Shoulder pain may seem worse at night, when there is less to distract you from the pain. If you sleep on your side, try to keep weight off your painful shoulder. Propping pillows behind you may stop you from rolling over onto that shoulder during sleep.     Shoulder and elbow joints can become stiff if left in a sling for too long. You should start range of motion exercises about 7 to 10 days after the injury. Talk with your provider to find out what type of exercises to  do and how soon to start.    You can take the sling off to shower or bathe.  Follow-up care  Follow up with your healthcare provider if you don t start to get better in the next 5 days.  When to seek medical advice  Call your healthcare provider right away if any of these occur:    Pain or swelling gets worse or continues for more than a few days    Your hand or fingers become cold, blue, numb, or tingly    Large amount of bruising on your shoulder or upper arm    Trouble moving your hand or fingers    Weakness in your hand or fingers    Your shoulder becomes stiff    It feels like your shoulder is popping out    You are less able to do your daily activities  [employer] last reviewed this educational content on 1/1/2020 2000-2020 [employer]. [address] [place] [address], [place] [address]. All rights reserved. This information is not intended as a substitute for professional medical care. Always follow your healthcare professional's instructions.           Patient Education     Preventing Repetitive Motion Injury: Shoulder    Making changes in how you use your shoulder can lessen your chances of repetitive motion injuries (RMIs). Use your shoulder wisely by following the tips below.   Position yourself well  ?Here are suggestions to prevent RMIs:    Don't raise your arms when working above shoulder level.    Use a stool or stepladder when needed to prevent awkward reaching.    Keep your work within easy reach. This helps prevent you from stretching or twisting your arms and shoulders.  Vary your tasks  Here are tips to prevent repetition:    Vary your on-the-job activity to help reduce the risk of RMIs.    Give your shoulder enough time to rest and recover from stressful tasks.    If one task causes you to feel pain, stop. Rest your shoulder. Go to another task if possible.  Limit force  Here are tips to reduce strain:    Ask for help when you need it.    Limit activities that could strain shoulder muscles  and tendons. These include heavy lifting, pushing, and pulling especially overhead. Get help when needed, or use dollies and wheelbarrows.    Find the best tools for each activity. The best tool lets you use the least force.    Rest before repeating a task that requires a lot of force. The more frequent the force, the greater the risk of RMIs.  [employer] last reviewed this educational content on 5/1/2018 2000-2020 [employer]. [address] [place] [address]. All rights reserved. This information is not intended as a substitute for professional medical care. Always follow your healthcare professional's instructions.           Patient Education     Back Safety: Sitting  Sitting can strain your back if you don t do it properly. Learn the right moves to protect your back.      Sitting down  Follow these steps to sit down. Reverse them to get back up.    Make sure the chair is behind you.    Place one foot slightly behind the other.    Tighten your stomach muscles. Bend forward from the hips, keeping your back straight.    Hold the armrests or sides of the seat for support.    Bend your knees. Use your leg muscles to lower yourself onto the seat.    Scoot back in the seat until you are comfortable.    Sitting safely    Keep your feet flat. Don t cross your legs.    A low footrest (no higher than 8 inches) may help.    A support behind your lower back or at your shoulder blades can help make you more comfortable.    When sitting for long periods, change your position from time to time. Also, get up every half hour and move around.    [employer] last reviewed this educational content on 10/1/2017    0278-9502 [employer]. [address] [place] [address]. All rights reserved. This information is not intended as a substitute for professional medical care. Always follow your healthcare professional's instructions.           Patient Education     Common Spine and Disk  Problems  The most common serious back problems happen when disks tear, bulge, or rupture. In such cases, an injured disk can no longer cushion the vertebrae and absorb shock. As a result, the rest of your spine may also weaken. This can lead to pain, stiffness, and other symptoms.                Torn annulus. A sudden movement may cause a tiny tear in an annulus. Nearby ligaments may stretch.    Contained herniated disk. As a disk wears out, the nucleus may bulge into the annulus and press on nerves.    Extruded herniated disk. When a disk ruptures, its nucleus can squeeze out and irritate a nerve.    Arthritis. As disks wear out over time, bone spurs form. These growths can irritate nerves and inflame facets.    Instability. As a disk stretches, the vertebrae slip back and forth. This can put pressure on the annulus.    Spondylolisthesis. This is a condition in which one vertebra has moved forward or backward, in relation to the one above or below it. This causes a crack (stress fracture) in the areas that link the vertebrae together. This may put pressure on the annulus, stretch the disk, and irritate nerves.  [employer] last reviewed this educational content on 6/1/2018 2000-2020 [employer]. [address] [place] [address], [place] [address]. All rights reserved. This information is not intended as a substitute for professional medical care. Always follow your healthcare professional's instructions.

## 2021-01-12 NOTE — PROGRESS NOTES
Assessment & Plan     Cervicalgia  Films today  Rest, Ice, Heat, Topical lidocaine,   Trial tizanidine and prednisone  - XR Cervical Spine 2/3 Views; Future  - tiZANidine (ZANAFLEX) 2 MG tablet; Take 1 tablet (2 mg) by mouth 3 times daily  - predniSONE (DELTASONE) 20 MG tablet; Take 2 tablets (40 mg) by mouth daily for 5 days  - Orthopedic & Spine  Referral; Future    Acute right-sided thoracic back pain  Films today.   Will call with results  POC as above   - tiZANidine (ZANAFLEX) 2 MG tablet; Take 1 tablet (2 mg) by mouth 3 times daily  - predniSONE (DELTASONE) 20 MG tablet; Take 2 tablets (40 mg) by mouth daily for 5 days  - Orthopedic & Spine  Referral; Future  - XR Thoracic Spine 3 Views; Future    Acute pain of left shoulder  ? Referred pain from neck or from  Shoulder  Imaging done today. Does not appear to be rotator cuff issue.  - XR Shoulder Left 2 Views; Future  - tiZANidine (ZANAFLEX) 2 MG tablet; Take 1 tablet (2 mg) by mouth 3 times daily  - predniSONE (DELTASONE) 20 MG tablet; Take 2 tablets (40 mg) by mouth daily for 5 days  - Orthopedic & Spine  Referral; Future    Subcutaneous mass of left forearm  - US Extremity Non Vascular Bilateral; Future  Dermatology consultation for biopsy as needed.   US call to schedule     Review of the result(s) of each unique test - US, XRAY NECK< LEFT SHOULDER< THORACIC SPINE           20 minutes spent on the date of the encounter doing chart review, history and exam, documentation and further activities as noted above    Call or return to the clinic with any worsening of symptoms or no resolution. Patient/Parent verbalized understanding and is in agreement. Medication side effects reviewed.   Current Outpatient Medications   Medication Sig Dispense Refill     albuterol (VENTOLIN HFA) 108 (90 Base) MCG/ACT inhaler Inhale 2 puffs into the lungs every 6 hours 3 Inhaler 3     predniSONE (DELTASONE) 20 MG tablet Take 2 tablets (40 mg) by mouth  daily for 5 days 10 tablet 0     tiotropium (SPIRIVA RESPIMAT) 2.5 MCG/ACT inhaler Inhale 2 puffs into the lungs daily 12 g 3     tiZANidine (ZANAFLEX) 2 MG tablet Take 1 tablet (2 mg) by mouth 3 times daily 45 tablet 1     TYLENOL EXTRA STRENGTH OR as directed       See Patient instructions    MARISSA Correa CNP  [facility]    Deb Resendiz is a 60 year old who presents to clinic today for the following health issues     HPI       Musculoskeletal problem/pain  Hard lump left forearm growing in size  Left shoulder pain after lifting pickle jar   Neck pain causing headache pain radiates to left shoulder and trapezius area  Right sided thoracic back pain.  Works at [employer] does a lot of lifting  Pain scale 6/10   Onset/Duration: 1 week  Description  Location: neck pain causing headaches  Joint Swelling: no  Redness: no  Pain: YES  Warmth: no  Intensity:  moderate  Progression of Symptoms:  worsening  Accompanying signs and symptoms:   Fevers: no  Numbness/tingling/weakness: no  History  Trauma to the area: YES left arm   Lifted a pickle jar and pulled a muscle   Bilateral hand pain Reynauds worse in winter months.     Concern - Derm   Onset: 1 year   Description: left forearm   Intensity: mild  Progression of Symptoms:  worsening  Accompanying Signs & Symptoms: swelling and getting bigger- no pain         Review of Systems   Constitutional, HEENT, cardiovascular, pulmonary, GI, , musculoskeletal, neuro, skin, endocrine and psych systems are negative, except as otherwise noted.      Objective    LMP 02/27/2013   There is no height or weight on file to calculate BMI.  Physical Exam   GENERAL: healthy, alert and no distress  EYES: Eyes grossly normal to inspection, PERRL and conjunctivae and sclerae normal  HENT: ear canals and TM's normal, nose and mouth without ulcers or lesions  NECK: no adenopathy, no asymmetry, masses, or scars and thyroid normal to palpation  RESP:  lungs clear to auscultation - no rales, rhonchi or wheezes  CV: regular rate and rhythm, normal S1 S2, no S3 or S4, no murmur, click or rub, no peripheral edema and peripheral pulses strong  ABDOMEN: soft, nontender, no hepatosplenomegaly, no masses and bowel sounds normal  MS: no gross musculoskeletal defects noted, no edema  MS: decreased range of motion neck, left shoulder, hands, peripheral pulses normal and tenderness to palpation left trapezius, left shoulder, right thoracic rib area   SKIN: hard mass left forearm 5x3 cm tender to touch. No redness. No drainage.   NEURO: Normal strength and tone, mentation intact and speech normal  PSYCH: mentation appears normal, affect normal/bright    Xr Cervical Spine 2/3 Views    Result Date: 1/12/2021  CERVICAL SPINE TWO - THREE VIEWS  1/12/2021 3:27 PM COMPARISON: None HISTORY: Neck pain x 2 weeks. Cervicalgia.     IMPRESSION: There is normal alignment of the cervical vertebrae. Vertebral body heights of the cervical spine are normal. Craniocervical alignment is normal. There are no fractures of the cervical spine.  Degenerative endplate spurring at C4-C5, C5-C6 and C6-C7. Mild-moderate facet arthropathy throughout the cervical spine. 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 Left lung pulmonary nodules, likely calcified granulomas. Osteopenia. No evidence of shoulder fracture or dislocation. MAR MELGAR MD    Xr Thoracic Spine 3 Views    Result Date: 1/12/2021  THORACIC SPINE THREE VIEW 1/12/2021 3:27 PM COMPARISON: None HISTORY: Thoracic back pain x 2 weeks, chiropractor. Acute right-sided thoracic back pain.     IMPRESSION: Normal alignment of the thoracic vertebrae. Vertebral body heights appear normal. No evidence for fracture. Mild  degenerative endplate spurring at multiple levels of the midthoracic spine. No other significant degenerative changes. IGNACIA AVILES MD

## 2021-01-14 DIAGNOSIS — R91.8 PULMONARY NODULES: Primary | ICD-10-CM

## 2021-01-14 DIAGNOSIS — M85.819 OSTEOPENIA OF SHOULDER, UNSPECIFIED LATERALITY: ICD-10-CM

## 2021-01-15 ENCOUNTER — HOSPITAL ENCOUNTER (OUTPATIENT)
Dept: ULTRASOUND IMAGING | Facility: CLINIC | Age: 61
Discharge: HOME | End: 2021-01-15
Attending: NURSE PRACTITIONER | Admitting: NURSE PRACTITIONER
Payer: COMMERCIAL

## 2021-01-15 DIAGNOSIS — R22.32 SUBCUTANEOUS MASS OF LEFT FOREARM: ICD-10-CM

## 2021-01-15 PROCEDURE — 76882 US LMTD JT/FCL EVL NVASC XTR: CPT | Mod: LT

## 2021-01-20 ENCOUNTER — HOSPITAL ENCOUNTER (OUTPATIENT)
Dept: BONE DENSITY | Facility: CLINIC | Age: 61
End: 2021-01-20
Attending: NURSE PRACTITIONER
Payer: COMMERCIAL

## 2021-01-20 ENCOUNTER — HOSPITAL ENCOUNTER (OUTPATIENT)
Dept: CT IMAGING | Facility: CLINIC | Age: 61
End: 2021-01-20
Attending: NURSE PRACTITIONER
Payer: COMMERCIAL

## 2021-01-20 DIAGNOSIS — M85.819 OSTEOPENIA OF SHOULDER, UNSPECIFIED LATERALITY: ICD-10-CM

## 2021-01-20 DIAGNOSIS — R91.8 PULMONARY NODULES: ICD-10-CM

## 2021-01-20 PROCEDURE — 250N000011 HC RX IP 250 OP 636: Performed by: RADIOLOGY

## 2021-01-20 PROCEDURE — 250N000009 HC RX 250: Performed by: RADIOLOGY

## 2021-01-20 PROCEDURE — 71260 CT THORAX DX C+: CPT

## 2021-01-20 PROCEDURE — 77080 DXA BONE DENSITY AXIAL: CPT

## 2021-01-20 RX ORDER — IOPAMIDOL 755 MG/ML
80 INJECTION, SOLUTION INTRAVASCULAR ONCE
Status: COMPLETED | OUTPATIENT
Start: 2021-01-20 | End: 2021-01-20

## 2021-01-20 RX ADMIN — SODIUM CHLORIDE 80 ML: 9 INJECTION, SOLUTION INTRAVENOUS at 15:05

## 2021-01-20 RX ADMIN — IOPAMIDOL 80 ML: 755 INJECTION, SOLUTION INTRAVENOUS at 15:05

## 2021-01-21 DIAGNOSIS — F17.200 SMOKER: ICD-10-CM

## 2021-01-21 DIAGNOSIS — R91.8 PULMONARY NODULES: Primary | ICD-10-CM

## 2021-01-21 DIAGNOSIS — R22.32 MASS OF LEFT FOREARM: Primary | ICD-10-CM

## 2021-01-27 ENCOUNTER — PRE VISIT (OUTPATIENT)
Dept: PULMONOLOGY | Facility: CLINIC | Age: 61
End: 2021-01-27

## 2021-01-27 NOTE — TELEPHONE ENCOUNTER
ONCOLOGY INTAKE: Records Information      APPT INFORMATION:  Referring provider:  CHRIS Darby  Referring provider s clinic:  [facility]  Reason for visit/diagnosis:  pulmonary nodules  Has patient been notified of appointment date and time?: yes    RECORDS INFORMATION:  Were the records received with the referral (via Rightfax)? no    Has patient been seen for any external appt for this diagnosis? no    If yes, where? n/a    Has patient had any imaging or procedures outside of [facility] for this condition? no      If Yes, where? n/a    ADDITIONAL INFORMATION:  none

## 2021-01-28 NOTE — TELEPHONE ENCOUNTER
RECORDS STATUS - ALL OTHER DIAGNOSIS      RECORDS RECEIVED FROM: [facility]   DATE RECEIVED: 1/28/21   NOTES STATUS DETAILS   OFFICE NOTE from referring provider Nohelia De La Paz APRN CNP in [facility]   OFFICE NOTE from medical oncologist     DISCHARGE SUMMARY from hospital     DISCHARGE REPORT from the ER     OPERATIVE REPORT     MEDICATION LIST [facility] 1/12/21   CLINICAL TRIAL TREATMENTS TO DATE     LABS     PATHOLOGY REPORTS     ANYTHING RELATED TO DIAGNOSIS Epic 1/2/21   GENONOMIC TESTING     TYPE:     IMAGING (NEED IMAGES & REPORT)     CT SCANS PACS 1/20/21: [facility]   MRI     MAMMO     ULTRASOUND     PET

## 2021-02-02 ENCOUNTER — VIRTUAL VISIT (OUTPATIENT)
Dept: PULMONOLOGY | Facility: CLINIC | Age: 61
End: 2021-02-02
Attending: NURSE PRACTITIONER
Payer: COMMERCIAL

## 2021-02-02 DIAGNOSIS — R91.1 LUNG NODULE: Primary | ICD-10-CM

## 2021-02-02 PROCEDURE — 99204 OFFICE O/P NEW MOD 45 MIN: CPT | Mod: 95 | Performed by: INTERNAL MEDICINE

## 2021-02-02 PROCEDURE — 999N001193 HC VIDEO/TELEPHONE VISIT; NO CHARGE

## 2021-02-02 NOTE — PROGRESS NOTES
"Astrid is a 60 year old who is being evaluated via a billable video visit.      How would you like to obtain your AVS? Mail a copy  If the video visit is dropped, the invitation should be resent by: Send to e-mail at: [email or web address]  Will anyone else be joining your video visit? No     Vitals - Patient Reported  Weight (Patient Reported): 59.9 kg (132 lb)  Height (Patient Reported): 157.5 cm (5' 2\")  BMI (Based on Pt Reported Ht/Wt): 24.14  Pain Score: Severe Pain (6)  Pain Loc: Low Back    Video Start Time: 430  Video-Visit Details    Type of service:  Video Visit    Video End Time:430    Originating Location (pt. Location): Home    Distant Location (provider location):  [facility]     Platform used for Video Visit: Yousuf Deluca MA    "

## 2021-02-02 NOTE — LETTER
"2/2/2021       RE: Astrid Santoro  [address] [facility] [address]     Dear Colleague,    Thank you for referring your patient, Astrid Santoro, to the [facility] at [facility]. Please see a copy of my visit note below.    Astrid is a 60 year old who is being evaluated via a billable video visit.      How would you like to obtain your AVS? Mail a copy  If the video visit is dropped, the invitation should be resent by: Send to e-mail at: [email or web address]  Will anyone else be joining your video visit? No     Vitals - Patient Reported  Weight (Patient Reported): 59.9 kg (132 lb)  Height (Patient Reported): 157.5 cm (5' 2\")  BMI (Based on Pt Reported Ht/Wt): 24.14  Pain Score: Severe Pain (6)  Pain Loc: Low Back    Video Start Time: 430  Video-Visit Details    Type of service:  Video Visit    Video End Time:430    Originating Location (pt. Location): Home    Distant Location (provider location):  [facility]     Platform used for Video Visit: Yousuf eDluca MA      LUNG NODULE & INTERVENTIONAL PULMONARY CLIN[facility]     Astrid Santoro MRN# [id number]   Age: 60 year old YOB: 1960     Reason for Consultation: lung nodule(s)    Requesting Physician: Nohelia Darby, MARISSA CNP  [address] [place] [id number]       Assessment and Plan:    1. New solitary pulmonary lung nodule(s). Given the characteristics on current/previous imaging and risk factors; I would classify this to be Intermediate (6-65%) risk for cancer. Calcification pattern seems c/w with benign cause, but given size, smoking history will plan on close surveillance.    Based on size, significant calcification I think that PET scan will most likely be low yield.    3 month CT ordered           History:     Astrid Santoro is a 60 year old " female with sig h/o for tobacco use who is here for evaluation/followup of lung nodule(s).    She had a shoulder x-ray and saw pulmonary nodule.  Follow-up CT found a 9 x 9 mm calcified lung nodule.  She is currently smoking.  She has no pulmonary symptoms.    CT chest reviewed interpreted by me: Right upper lobe nodule with eccentric calcification.  Labs reviewed: Previous calcium normal.  Previously normal metabolic panel.           Past Medical History:      Past Medical History:   Diagnosis Date     Abnormal Pap smear of cervix     2017     Cervical high risk HPV (human papillomavirus) test positive 10/04/2017    12/15/20     Human papillomavirus in conditions classified elsewhere and of unspecified site      Lupus erythematosus      Other, mixed, or unspecified nondependent drug abuse, unspecified     Meth, Marijuana. Currently smoking marijuana.     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     SURGICAL HISTORY OF -   01/30/98, 09/03/97    Loop Electrosurgical Excision Procedure (LEEP)      TUBAL LIGATION            Social History:     Social History     Tobacco Use     Smoking status: Current Every Day Smoker     Packs/day: 0.50     Years: 36.00     Pack years: 18.00     Types: Cigarettes     Smokeless tobacco: Never Used     Tobacco comment: cutting down,    Substance Use Topics     Alcohol use: Yes     Comment: 1-2 drinks per day ( 2 bottles)          Family History:     Family History   Problem Relation Age of Onset     Heart Disease Father      Hypertension Father      Alcohol/Drug Father      Diabetes Maternal Grandmother      Family History Negative Mother            Allergies:      Allergies   Allergen Reactions     Shrimp Nausea and Vomiting     Metronidazole Rash          Medications:     Current Outpatient Medications   Medication Sig     albuterol (VENTOLIN HFA) 108 (90 Base)  MCG/ACT inhaler Inhale 2 puffs into the lungs every 6 hours     tiotropium (SPIRIVA RESPIMAT) 2.5 MCG/ACT inhaler Inhale 2 puffs into the lungs daily     tiZANidine (ZANAFLEX) 2 MG tablet Take 1 tablet (2 mg) by mouth 3 times daily     TYLENOL EXTRA STRENGTH OR as directed     meloxicam (MOBIC) 15 MG tablet Take 1 tablet (15 mg) by mouth daily     No current facility-administered medications for this visit.           Review of Systems:     CONSTITUTIONAL: negative for fever, chills, change in weight  INTEGUMENTARY/SKIN: no rash or obvious new lesions  ENT/MOUTH: no sore throat, new sinus pain or nasal drainage  RESP: see interval history  CV: negative for chest pain, palpitations or peripheral edema  GI: no nausea, vomiting, change in stools  : no dysuria  MUSCULOSKELETAL: no myalgias, arthralgias  ENDOCRINE: blood sugars with adequate control  PSYCHIATRIC: mood stable  LYMPHATIC: no new lymphadenopathy  HEME: no bleeding or easy bruisability  NEURO: no numbness, weakness, headaches         Physical Exam:     Constitutional - looks well, in no apparent distress  Eyes - no redness or discharge  Respiratory -breathing appears comfortable. No wheeze or rhonchi.   Cardiac -- Normal rate, rhythm.   Skin - No appreciable discoloration or lesions (very limited exam)  Neurological - No apparent tremors. Speech fluent and articlate  Psychiatric - no signs of delirium or anxiety     Exam limited to that easily identified on a virtual visit. The rest of a comprehensive physical examination is deferred due to PHE (public health emergency) video visit restrictions.                Again, thank you for allowing me to participate in the care of your patient.      Sincerely,    Inocencio Rivers MD

## 2021-02-03 ENCOUNTER — OFFICE VISIT (OUTPATIENT)
Dept: FAMILY MEDICINE | Facility: CLINIC | Age: 61
End: 2021-02-03
Payer: COMMERCIAL

## 2021-02-03 VITALS
DIASTOLIC BLOOD PRESSURE: 62 MMHG | BODY MASS INDEX: 24.49 KG/M2 | HEART RATE: 80 BPM | WEIGHT: 135 LBS | SYSTOLIC BLOOD PRESSURE: 116 MMHG

## 2021-02-03 DIAGNOSIS — M50.30 DDD (DEGENERATIVE DISC DISEASE), CERVICAL: ICD-10-CM

## 2021-02-03 DIAGNOSIS — R91.8 PULMONARY NODULES: ICD-10-CM

## 2021-02-03 DIAGNOSIS — M51.34 DDD (DEGENERATIVE DISC DISEASE), THORACIC: ICD-10-CM

## 2021-02-03 DIAGNOSIS — L98.9 SKIN LESION OF LEFT ARM: Primary | ICD-10-CM

## 2021-02-03 DIAGNOSIS — M47.22 OSTEOARTHRITIS OF SPINE WITH RADICULOPATHY, CERVICAL REGION: ICD-10-CM

## 2021-02-03 PROCEDURE — 99213 OFFICE O/P EST LOW 20 MIN: CPT | Performed by: NURSE PRACTITIONER

## 2021-02-03 RX ORDER — MELOXICAM 15 MG/1
15 TABLET ORAL DAILY
Qty: 30 TABLET | Refills: 3 | Status: SHIPPED | OUTPATIENT
Start: 2021-02-03 | End: 2021-03-16

## 2021-02-03 NOTE — PROGRESS NOTES
Assessment & Plan     Skin lesion of left arm  Consultation for removal of skin lesion and biopsy  - DERMATOLOGY ADULT REFERRAL; Future    Osteoarthritis of spine with radiculopathy, cervical region  Ongoing pain.   New medication  - meloxicam (MOBIC) 15 MG tablet; Take 1 tablet (15 mg) by mouth daily    DDD (degenerative disc disease), cervical  Can take up to 3500 mg tylenol daily as well  - meloxicam (MOBIC) 15 MG tablet; Take 1 tablet (15 mg) by mouth daily    DDD (degenerative disc disease), thoracic  Symptomatic care strategies reviewed.  Consider ortho non surgical consultation and Physical Therapy consulation   - meloxicam (MOBIC) 15 MG tablet; Take 1 tablet (15 mg) by mouth daily    Pulmonary nodules  Follow up CT 3 months.   - CT Chest w/o Contrast; Future          24 minutes spent on the date of the encounter doing chart review, history and exam, documentation and further activities as noted above    Call or return to the clinic with any worsening of symptoms or no resolution. Patient/Parent verbalized understanding and is in agreement. Medication side effects reviewed.   Current Outpatient Medications   Medication Sig Dispense Refill     meloxicam (MOBIC) 15 MG tablet Take 1 tablet (15 mg) by mouth daily 30 tablet 3     albuterol (VENTOLIN HFA) 108 (90 Base) MCG/ACT inhaler Inhale 2 puffs into the lungs every 6 hours 3 Inhaler 3     tiotropium (SPIRIVA RESPIMAT) 2.5 MCG/ACT inhaler Inhale 2 puffs into the lungs daily 12 g 3     tiZANidine (ZANAFLEX) 2 MG tablet Take 1 tablet (2 mg) by mouth 3 times daily 45 tablet 1     TYLENOL EXTRA STRENGTH OR as directed       Chart documentation with Dragon Voice recognition Software. Although reviewed after completion, some words and grammatical errors may remain.  See KISHA Darby, MARISSA CNP  [facility]    Deb Resendiz is a 60 year old who presents to clinic today for the following health issues     HPI   LEFT  FOREARM LESION would like removed not able to afford MRI  ONGOING LEFT SHOULDER PAIN, NECK PAIN, MID THORACIC PAIN THAT RADIATES AROUND TO THE ABDOMEN  TOOK 2 days off of work after working outside due to pain and needs note for work    Skin lesion left forearm painful at times.   Spoke with lung specialist who reviewed her MRI and told her to follow up repeat CT in 3 months to monitor.     Concern - RESULTS   CT CHEST WITH CONTRAST 1/20/2021 3:15 PM      HISTORY: Lung nodule (pulmonary nodule). Pulmonary nodules.  FINDINGS:    Lungs: In the lateral right upper lobe on image 62 of series 5 there  is a 9 x 9 mm spiculated nodule with some eccentric calcification.  Additionally in the superior segment left lower lobe there is a 10 mm  mass with more significant calcification that likely represents a  benign granuloma, and there is a 6 mm left upper lobe nodule on the  same image that is nearly completely calcified. No infiltrate or  consolidation. The central airways are patent.     Mediastinum/axillae: The heart is normal in size. No pericardial  effusion. No axillary, mediastinal, or hilar adenopathy. There is a  partially calcified right mediastinal lymph node. The aorta is normal  in caliber.                                                    IMPRESSION:  Spiculated right upper lobe 9 mm nodule with partial  eccentric calcification. Considering the calcified granulomas in the  left upper lobe and left lower lobe, this is likely a benign granuloma  as well, but PET scan should be considered to better evaluate this  lesion.    US EXTREMITY NON VASCULAR LEFT 1/15/2021 3:33 PM                                                                  IMPRESSION: There is a solid or complex mass measuring 2.6 x 0.6 x 2.4  cm. The ultrasound appearance is nonspecific. The differential  diagnosis would include sebaceous/epidermoid cyst or other complex  cystic lesion, hematoma, benign tumor, or malignant tumor. If  indicated  clinically, MRI would be the study of choice for the  evaluation of a soft tissue mass.  DX HIP/PELVIS/SPINE  1/20/2021 3:03 PM     HISTORY:  Osteopenia of shoulder, unspecified laterality     FINDINGS: This DEXA scan was performed using a Squid Facilcanner.   DEXA results are reported according to T-score.  The T-score is the  standard deviation from the peak bone mass in a normal young adult  population.  In accordance with the ISCD (International Society of  Clinical Densitometry), the lower of the total proximal femur vs  femoral neck T-score is reported.  Osteopenia is defined as a T-score  of -1.0 to -2.5.  Osteoporosis is defined as a T-score of less than  -2.5.     T-SCORES:  Lumbar Spine L1-L4 T-score: -0.7  Note: The composite L1-2 T-score is -1.6     Left Hip (Neck) T-score: -1.2  Right Hip (Neck) T-score: -1.4     Hip lowest neck BMD: 0.848 gm/cm2.                                                                      IMPRESSION: Bilateral hip and upper lumbar spine osteopenia.     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 Left lung pulmonary nodules, likely calcified granulomas.  Osteopenia. No evidence of shoulder fracture or dislocation.     MAR MELGAR MD  THORACIC SPINE THREE VIEW 1/12/2021 3:27 PM      COMPARISON: None     HISTORY: Thoracic back pain x 2 weeks, chiropractor. Acute right-sided  thoracic back pain.                                                                      IMPRESSION: Normal alignment of the thoracic vertebrae. Vertebral body  heights appear normal. No evidence for fracture. Mild degenerative  endplate spurring at multiple levels of the midthoracic spine. No  other significant degenerative changes.     IGNACIA AVILES MD  CERVICAL SPINE TWO - THREE VIEWS   1/12/2021 3:27 PM      COMPARISON: None     HISTORY: Neck pain x 2 weeks. Cervicalgia.                                                                      IMPRESSION: There is normal alignment of the cervical vertebrae.  Vertebral body heights of the cervical spine are normal.  Craniocervical alignment is normal. There are no fractures of the  cervical spine.  Degenerative endplate spurring at C4-C5, C5-C6 and  C6-C7. Mild-moderate facet arthropathy throughout the cervical spine.     IGNACIA AVILES MD    Review of Systems   Constitutional, HEENT, cardiovascular, pulmonary, GI, , musculoskeletal, neuro, skin, endocrine and psych systems are negative, except as otherwise noted.      Objective    LMP 02/27/2013   There is no height or weight on file to calculate BMI.  Physical Exam   GENERAL: healthy, alert and no distress  EYES: Eyes grossly normal to inspection, PERRL and conjunctivae and sclerae normal  HENT: ear canals and TM's normal, nose and mouth without ulcers or lesions  NECK: no adenopathy, no asymmetry, masses, or scars and thyroid normal to palpation  RESP: lungs clear to auscultation - no rales, rhonchi or wheezes  CV: regular rate and rhythm, normal S1 S2, no S3 or S4, no murmur, click or rub, no peripheral edema and peripheral pulses strong  ABDOMEN: soft, nontender, no hepatosplenomegaly, no masses and bowel sounds normal  MS: finger amputations well healed and clubbing fingers bilaterlly  SKIN: quarter size lesion left forearm. Non tender.   NEURO: Normal strength and tone, mentation intact and speech normal  PSYCH: mentation appears normal, affect normal/bright    Orders Only on 01/02/2021   Component Date Value Ref Range Status     Occult Blood Scn FIT 01/02/2021 Negative  NEG^Negative Final

## 2021-02-03 NOTE — LETTER
February 3, 2021      Astrid Santoro  [address] [facility] [address]        To Whom It May Concern:    Astrid Santoro  was seen on February 3, 2021.  Please excuse her from work 1/20 and 1/21 due to pain.        Sincerely,        MARISSA Correa CNP

## 2021-02-03 NOTE — PATIENT INSTRUCTIONS
GET FMLA PAPERWORK FROM  AND BRING IN FOR US TO COMPLETE CAN BE A VIDEO VISIT .  MAKE SURE FORMS ARE HERE FIRST

## 2021-02-05 NOTE — PROGRESS NOTES
LUNG NODULE & INTERVENTIONAL PULMONARY CLINIC  CLINICS & SURGERY CENTER, [facility]     Astrid Santoro MRN# [id number]   Age: 60 year old YOB: 1960     Reason for Consultation: lung nodule(s)    Requesting Physician: MARISSA Correa CNP  [address] [place] [address]       Assessment and Plan:    1. New solitary pulmonary lung nodule(s). Given the characteristics on current/previous imaging and risk factors; I would classify this to be Intermediate (6-65%) risk for cancer. Calcification pattern seems c/w with benign cause, but given size, smoking history will plan on close surveillance.    Based on size, significant calcification I think that PET scan will most likely be low yield.    3 month CT ordered           History:     Astrid Santoro is a 60 year old female with sig h/o for tobacco use who is here for evaluation/followup of lung nodule(s).    She had a shoulder x-ray and saw pulmonary nodule.  Follow-up CT found a 9 x 9 mm calcified lung nodule.  She is currently smoking.  She has no pulmonary symptoms.    CT chest reviewed interpreted by me: Right upper lobe nodule with eccentric calcification.  Labs reviewed: Previous calcium normal.  Previously normal metabolic panel.           Past Medical History:      Past Medical History:   Diagnosis Date     Abnormal Pap smear of cervix     2017     Cervical high risk HPV (human papillomavirus) test positive 10/04/2017    12/15/20     Human papillomavirus in conditions classified elsewhere and of unspecified site      Lupus erythematosus      Other, mixed, or unspecified nondependent drug abuse, unspecified     Meth, Marijuana. Currently smoking marijuana.     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     SURGICAL HISTORY OF -   01/30/98, 09/03/97    Loop Electrosurgical  Excision Procedure (LEEP)      TUBAL LIGATION            Social History:     Social History     Tobacco Use     Smoking status: Current Every Day Smoker     Packs/day: 0.50     Years: 36.00     Pack years: 18.00     Types: Cigarettes     Smokeless tobacco: Never Used     Tobacco comment: cutting down,    Substance Use Topics     Alcohol use: Yes     Comment: 1-2 drinks per day ( 2 bottles)          Family History:     Family History   Problem Relation Age of Onset     Heart Disease Father      Hypertension Father      Alcohol/Drug Father      Diabetes Maternal Grandmother      Family History Negative Mother            Allergies:      Allergies   Allergen Reactions     Shrimp Nausea and Vomiting     Metronidazole Rash          Medications:     Current Outpatient Medications   Medication Sig     albuterol (VENTOLIN HFA) 108 (90 Base) MCG/ACT inhaler Inhale 2 puffs into the lungs every 6 hours     tiotropium (SPIRIVA RESPIMAT) 2.5 MCG/ACT inhaler Inhale 2 puffs into the lungs daily     tiZANidine (ZANAFLEX) 2 MG tablet Take 1 tablet (2 mg) by mouth 3 times daily     TYLENOL EXTRA STRENGTH OR as directed     meloxicam (MOBIC) 15 MG tablet Take 1 tablet (15 mg) by mouth daily     No current facility-administered medications for this visit.           Review of Systems:     CONSTITUTIONAL: negative for fever, chills, change in weight  INTEGUMENTARY/SKIN: no rash or obvious new lesions  ENT/MOUTH: no sore throat, new sinus pain or nasal drainage  RESP: see interval history  CV: negative for chest pain, palpitations or peripheral edema  GI: no nausea, vomiting, change in stools  : no dysuria  MUSCULOSKELETAL: no myalgias, arthralgias  ENDOCRINE: blood sugars with adequate control  PSYCHIATRIC: mood stable  LYMPHATIC: no new lymphadenopathy  HEME: no bleeding or easy bruisability  NEURO: no numbness, weakness, headaches         Physical Exam:     Constitutional - looks well, in no apparent distress  Eyes - no redness or  discharge  Respiratory -breathing appears comfortable. No wheeze or rhonchi.   Cardiac -- Normal rate, rhythm.   Skin - No appreciable discoloration or lesions (very limited exam)  Neurological - No apparent tremors. Speech fluent and articlate  Psychiatric - no signs of delirium or anxiety     Exam limited to that easily identified on a virtual visit. The rest of a comprehensive physical examination is deferred due to PHE (public health emergency) video visit restrictions.

## 2021-02-11 ENCOUNTER — TELEPHONE (OUTPATIENT)
Dept: FAMILY MEDICINE | Facility: CLINIC | Age: 61
End: 2021-02-11

## 2021-02-11 DIAGNOSIS — M50.30 DDD (DEGENERATIVE DISC DISEASE), CERVICAL: Primary | ICD-10-CM

## 2021-02-11 DIAGNOSIS — R91.8 PULMONARY NODULES: ICD-10-CM

## 2021-02-11 DIAGNOSIS — M51.34 DDD (DEGENERATIVE DISC DISEASE), THORACIC: ICD-10-CM

## 2021-02-12 NOTE — TELEPHONE ENCOUNTER
FMLA forms completed  In green folder  Please stat scan to chart, copy to patient and send to employer.  Thanks Nohelia Darby [facility]-BC

## 2021-03-02 ENCOUNTER — HOSPITAL ENCOUNTER (OUTPATIENT)
Dept: MRI IMAGING | Facility: CLINIC | Age: 61
Discharge: HOME | End: 2021-03-02
Attending: NURSE PRACTITIONER | Admitting: NURSE PRACTITIONER
Payer: COMMERCIAL

## 2021-03-02 DIAGNOSIS — R22.32 MASS OF LEFT FOREARM: ICD-10-CM

## 2021-03-02 LAB — RADIOLOGIST FLAGS: NORMAL

## 2021-03-02 PROCEDURE — 73218 MRI UPPER EXTREMITY W/O DYE: CPT | Mod: 26 | Performed by: RADIOLOGY

## 2021-03-02 PROCEDURE — 73218 MRI UPPER EXTREMITY W/O DYE: CPT | Mod: LT

## 2021-03-04 ENCOUNTER — TELEPHONE (OUTPATIENT)
Dept: ORTHOPEDICS | Facility: CLINIC | Age: 61
End: 2021-03-04

## 2021-03-04 NOTE — TELEPHONE ENCOUNTER
Nohelia Darby APRN CNP Chapman-Shultz, Dixie, RN; Nohelia Darby APRN CNP             Good Evening,   Are you ladies still the ortho oncology nurse coordinators ?   One of my patients recently had a MRI with the recommendation for consult with Ortho Onc. Wondering if you can help me to get her a appointment set up? I was hoping to have a plan in place before I called her with the results.     If you are not able to help could you just send me a note and I will explore other options.   Thank you in advance for your assistance.   Nohelia Darby MSN,FNP-[address] [address]   [phone]    MR Forearm Left w/o Contrast  Order: [id number]  Status:  Final result   Visible to patient:  No (inaccessible in MyChart)   Dx:  Mass of left forearm  Findings

## 2021-03-05 ENCOUNTER — TELEPHONE (OUTPATIENT)
Dept: FAMILY MEDICINE | Facility: CLINIC | Age: 61
End: 2021-03-05

## 2021-03-05 NOTE — TELEPHONE ENCOUNTER
Received call from [employer]. MRI of 3/2 report available with concerns. Message to Nohelia Martin for her review.  Renetta TEJADA RN

## 2021-03-05 NOTE — TELEPHONE ENCOUNTER
Patient aware of visit and MRI report.   Message confirming appt with with Marcela Darby MSN,FNP-BC  [address] [address] [phone]

## 2021-03-05 NOTE — TELEPHONE ENCOUNTER
----- Message from Marcela Tiwari RN sent at 3/4/2021  4:33 PM CST -----  Dr. Rahman could do a virtual appt with her on  03-09-21 at 1430.  I will put a hold on it until I hear back from you.    AMBORCIO [facility]ranoCC Ortho    Please give her our contact information and address  [phone]  [address] [place] [address]

## 2021-03-08 NOTE — TELEPHONE ENCOUNTER
RECORDS RECEIVED FROM: Mass of left forearm referred by Nohelia Darby    DATE RECEIVED: Mar 9, 2021     NOTES STATUS DETAILS   OFFICE NOTE from referring provider Internal  Nohelia Darby APRN CNP   OFFICE NOTE from other specialist N/A    DISCHARGE SUMMARY from hospital N/A    DISCHARGE REPORT from the ER N/A    OPERATIVE REPORT N/A    MEDICATION LIST Internal    IMPLANT RECORD/STICKER N/A    LABS     CBC/DIFF N/A    CULTURES N/A    INJECTIONS DONE IN RADIOLOGY N/A    MRI Internal    CT SCAN INTERNAL    XRAYS (IMAGES & REPORTS) N/A    TUMOR     PATHOLOGY  Slides & report N/A      03/08/21   9:55 AM   COMPLETE  Gloria Abebe CMA

## 2021-03-09 ENCOUNTER — PRE VISIT (OUTPATIENT)
Dept: ORTHOPEDICS | Facility: CLINIC | Age: 61
End: 2021-03-09

## 2021-03-09 ENCOUNTER — VIRTUAL VISIT (OUTPATIENT)
Dept: ORTHOPEDICS | Facility: CLINIC | Age: 61
End: 2021-03-09
Payer: COMMERCIAL

## 2021-03-09 DIAGNOSIS — D21.12 BENIGN NEOPLASM OF SOFT TISSUES OF LEFT UPPER EXTREMITY: Primary | ICD-10-CM

## 2021-03-09 PROCEDURE — 99202 OFFICE O/P NEW SF 15 MIN: CPT | Mod: 95 | Performed by: ORTHOPAEDIC SURGERY

## 2021-03-09 NOTE — LETTER
3/9/2021         RE: Astrid Santoro  [address] [facility] [address]        Dear Colleague,    Thank you for referring your patient, Astrid Santoro, to the [facility] [place]. Please see a copy of my visit note below.    Chief complaint tumor left forearm.    Astrid is a 60-year-old who reports a greater than 1 year history of a mass in the subcutaneous aspect of her left forearm.  She reports this has grown recently.  Is not particularly painful she describes it as being mobile.  She reports it is quite firm.    Her past history other than smoking is not contributory.    I reviewed her MRI scan which does show a subcutaneous mass with associated nodules.  The etiology of this is uncertain but it appears to be highly fibrous with low water content such as a fibrous tissue or actually bone.    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  Biopsy and removal based on frozen section is warranted.    Plan: 1.  Clem to work on his surgical scheduling.  2.  Case request form was completed.  Again, thank you for allowing me to participate in the care of your patient.        Sincerely,        John Rahman MD

## 2021-03-09 NOTE — PROGRESS NOTES
Chief complaint tumor left forearm.    Astrid is a 60-year-old who reports a greater than 1 year history of a mass in the subcutaneous aspect of her left forearm.  She reports this has grown recently.  Is not particularly painful she describes it as being mobile.  She reports it is quite firm.    Her past history other than smoking is not contributory.    I reviewed her MRI scan which does show a subcutaneous mass with associated nodules.  The etiology of this is uncertain but it appears to be highly fibrous with low water content such as a fibrous tissue or actually bone.    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  Biopsy and removal based on frozen section is warranted.    Plan: 1.  Clem to work on his surgical scheduling.  2.  Case request form was completed.

## 2021-03-09 NOTE — NURSING NOTE
Chief Complaint   Patient presents with     Consult     mass of left forearm        60 year old  1960    LMP 02/27/2013       Pain Assessment  Patient Currently in Pain: Denies         [facility] [address] [place] [address] - [address] [facility] - [place], [place] - [address]        Allergies   Allergen Reactions     Shrimp Nausea and Vomiting     Metronidazole Rash           Current Outpatient Medications   Medication     albuterol (VENTOLIN HFA) 108 (90 Base) MCG/ACT inhaler     meloxicam (MOBIC) 15 MG tablet     tiotropium (SPIRIVA RESPIMAT) 2.5 MCG/ACT inhaler     tiZANidine (ZANAFLEX) 2 MG tablet     TYLENOL EXTRA STRENGTH OR     No current facility-administered medications for this visit.       Hide Second Anesthesia?: No Biopsy Method: Personna blade Anesthesia Type: 2% lidocaine with epinephrine and a 1:10 solution of 8.4% sodium bicarbonate Dressing: bandage Electrodesiccation Text: The wound bed was treated with electrodesiccation after the biopsy was performed. Type Of Destruction Used: Curettage Hemostasis: Hot cautery Size Of Lesion In Cm: 1.4 Billing Type: Third-Party Bill Post-Care Instructions: I reviewed with the patient in detail post-care instructions. Patient is to keep the biopsy site dry overnight, and then apply bacitracin twice daily until healed. Patient may apply hydrogen peroxide soaks to remove any crusting. Electrodesiccation And Curettage Text: The wound bed was treated with electrodesiccation and curettage after the biopsy was performed. Additional Anesthesia Volume In Cc (Will Not Render If 0): 0 X Size Of Lesion In Cm: 1.6 Depth Of Biopsy: dermis Detail Level: Detailed Biopsy Type: H and E Anesthesia Volume In Cc: 3 Silver Nitrate Text: The wound bed was treated with silver nitrate after the biopsy was performed. Curettage Text: The wound bed was treated with curettage after the biopsy was performed. Notification Instructions: Patient will be notified of biopsy results. However, patient instructed to call the office if not contacted within 2 weeks. Consent: Written consent was obtained and risks were reviewed including but not limited to scarring, infection, bleeding, scabbing, incomplete removal, nerve damage and allergy to anesthesia. Accession #: [id number] Wound Care: Petrolatum Cryotherapy Text: The wound bed was treated with cryotherapy after the biopsy was performed. Was A Bandage Applied: Yes Lab: [id number]

## 2021-03-10 ENCOUNTER — TELEPHONE (OUTPATIENT)
Dept: ORTHOPEDICS | Facility: CLINIC | Age: 61
End: 2021-03-10

## 2021-03-10 DIAGNOSIS — Z11.59 ENCOUNTER FOR SCREENING FOR OTHER VIRAL DISEASES: ICD-10-CM

## 2021-03-10 PROBLEM — D21.12 BENIGN NEOPLASM OF SOFT TISSUES OF LEFT UPPER EXTREMITY: Status: ACTIVE | Noted: 2021-03-10

## 2021-03-10 NOTE — TELEPHONE ENCOUNTER
Patient is scheduled for surgery with Dr. Rahman      Spoke or left message with: Spoke with Astrid    Date of Surgery: 3/19/21    Location: [facility]    Informed patient they will need an adult  Yes    Pre-op with surgeon (if applicable): Complete     H&P: Scheduled with PAC    Additional imaging/appointments: patient will await call from covid scheduling team    Surgery packet: Given in clinic     Additional comments: Patient will receive arrival time at PAC

## 2021-03-11 NOTE — TELEPHONE ENCOUNTER
FUTURE VISIT INFORMATION      SURGERY INFORMATION:    Date: 3.19.21    [place] [facility] [place]    Surgeon:  santos    Anesthesia Type:  general    Procedure: biopsy and possible removal tumor left forearm    Consult: 3.9.21    RECORDS REQUESTED FROM:       Primary Care Provider: Nohelia Darby    Most recent ECHO: 6.27.11

## 2021-03-14 ENCOUNTER — DOCUMENTATION ONLY (OUTPATIENT)
Dept: CARE COORDINATION | Facility: CLINIC | Age: 61
End: 2021-03-14

## 2021-03-15 DIAGNOSIS — Z11.59 ENCOUNTER FOR SCREENING FOR OTHER VIRAL DISEASES: ICD-10-CM

## 2021-03-15 LAB
SARS-COV-2 RNA RESP QL NAA+PROBE: NORMAL
SPECIMEN SOURCE: NORMAL

## 2021-03-15 PROCEDURE — U0003 INFECTIOUS AGENT DETECTION BY NUCLEIC ACID (DNA OR RNA); SEVERE ACUTE RESPIRATORY SYNDROME CORONAVIRUS 2 (SARS-COV-2) (CORONAVIRUS DISEASE [COVID-19]), AMPLIFIED PROBE TECHNIQUE, MAKING USE OF HIGH THROUGHPUT TECHNOLOGIES AS DESCRIBED BY CMS-2020-01-R: HCPCS | Performed by: ORTHOPAEDIC SURGERY

## 2021-03-15 PROCEDURE — U0005 INFEC AGEN DETEC AMPLI PROBE: HCPCS | Performed by: ORTHOPAEDIC SURGERY

## 2021-03-16 ENCOUNTER — VIRTUAL VISIT (OUTPATIENT)
Dept: SURGERY | Facility: CLINIC | Age: 61
End: 2021-03-16
Payer: COMMERCIAL

## 2021-03-16 ENCOUNTER — ANESTHESIA EVENT (OUTPATIENT)
Dept: SURGERY | Facility: AMBULATORY SURGERY CENTER | Age: 61
End: 2021-03-16

## 2021-03-16 ENCOUNTER — PRE VISIT (OUTPATIENT)
Dept: SURGERY | Facility: CLINIC | Age: 61
End: 2021-03-16

## 2021-03-16 ENCOUNTER — TELEPHONE (OUTPATIENT)
Dept: ORTHOPEDICS | Facility: CLINIC | Age: 61
End: 2021-03-16

## 2021-03-16 DIAGNOSIS — Z01.818 PRE-OP EXAMINATION: Primary | ICD-10-CM

## 2021-03-16 LAB
LABORATORY COMMENT REPORT: NORMAL
SARS-COV-2 RNA RESP QL NAA+PROBE: NEGATIVE
SPECIMEN SOURCE: NORMAL

## 2021-03-16 PROCEDURE — 98967 PH1 ASSMT&MGMT NQHP 11-20: CPT | Performed by: PHYSICIAN ASSISTANT

## 2021-03-16 RX ORDER — OMEGA-3 FATTY ACIDS/FISH OIL 300-1000MG
400 CAPSULE ORAL EVERY 8 HOURS PRN
COMMUNITY

## 2021-03-16 RX ORDER — OMEGA-3 FATTY ACIDS/FISH OIL 300-1000MG
CAPSULE ORAL EVERY MORNING
COMMUNITY

## 2021-03-16 RX ORDER — MULTIPLE VITAMINS W/ MINERALS TAB 9MG-400MCG
1 TAB ORAL EVERY MORNING
COMMUNITY

## 2021-03-16 SDOH — HEALTH STABILITY: MENTAL HEALTH: HOW OFTEN DO YOU HAVE A DRINK CONTAINING ALCOHOL?: 4 OR MORE TIMES A WEEK

## 2021-03-16 SDOH — HEALTH STABILITY: MENTAL HEALTH: HOW OFTEN DO YOU HAVE 6 OR MORE DRINKS ON ONE OCCASION?: NOT ASKED

## 2021-03-16 SDOH — HEALTH STABILITY: MENTAL HEALTH: HOW MANY STANDARD DRINKS CONTAINING ALCOHOL DO YOU HAVE ON A TYPICAL DAY?: NOT ASKED

## 2021-03-16 ASSESSMENT — ENCOUNTER SYMPTOMS: SEIZURES: 0

## 2021-03-16 ASSESSMENT — COPD QUESTIONNAIRES
CAT_SEVERITY: MILD
COPD: 1

## 2021-03-16 ASSESSMENT — LIFESTYLE VARIABLES: TOBACCO_USE: 1

## 2021-03-16 ASSESSMENT — PAIN SCALES - GENERAL: PAINLEVEL: MILD PAIN (2)

## 2021-03-16 NOTE — TELEPHONE ENCOUNTER
RN called to review surgery packet with patient but left voice message that I woiuld try again tomorrow as she did not answer.      Patient is scheduled for surgery with Dr. Rahman        Spoke or left message with: Spoke with Astrid     Date of Surgery: 3/19/21     Location: [facility]     Informed patient they will need an adult  Yes     Pre-op with surgeon (if applicable): Complete      H&P: Scheduled with PAC     Additional imaging/appointments: patient will await call from covid scheduling team     Surgery packet: Given in clinic      Additional comments: Patient will receive arrival time at PAC

## 2021-03-16 NOTE — ANESTHESIA PREPROCEDURE EVALUATION
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 Currently smoking marijuana.     Pulmonary nodules      Raynaud's syndrome      Seasonal allergic rhinitis       Past Surgical History:   Procedure Laterality Date     amputation of fingers  ,     Reynaud related     SURGICAL HISTORY OF -   98, 97    Loop Electrosurgical Excision Procedure (LEEP)      TUBAL LIGATION        Allergies   Allergen Reactions     Shrimp Nausea and Vomiting     Metronidazole Rash      Social History     Tobacco Use     Smoking status: Current Every Day Smoker     Packs/day: 0.50     Years: 36.00     Pack years: 18.00     Types: Cigarettes     Smokeless tobacco: Never Used     Tobacco comment: cutting down,    Substance Use Topics     Alcohol use: Yes     Comment: 1-2 drinks per day ( 2 bottles)      Wt Readings from Last 1 Encounters:   21 61.2 kg (135 lb)        Anesthesia Evaluation   Pt has had prior anesthetic. Type: Regional and General.    History of anesthetic complications   slow to wake - reports after tubal ligation took her 2 hours instead of 20 minutes to wake up .    ROS/MED HX  ENT/Pulmonary: Comment: Pulmonary nodules    (+) allergic rhinitis, tobacco use (0.5 ppd for the past 36 years), Current use, mild,  COPD,     Neurologic:  - neg neurologic ROS   (-) no seizures, no CVA and migraines   Cardiovascular:     (+) -----Previous cardiac testing   Echo: Date: 2011 Results:  Interpretation Summary   The study was technically adequate.   Left ventricular systolic function is normal. The visual ejection fraction is   estimated at 60-65%. Right ventricle systolic pressure estimate normal Normal   left ventricular diastolic function   PatientHeight: 63 in   PatientWeight: 176 lbs   SystolicPressure: 96 mmHg   DiastolicPressure: 64 mmHg   HeartRate: 65 bpm   BSA 1.8 m^2    Stress Test: Date: Results:    ECG Reviewed: Date: Results:    Cath: Date: Results:      METS/Exercise Tolerance: >4 METS    Hematologic:  - neg hematologic  ROS     Musculoskeletal: Comment: raynauds - s/p amputation of right hand fingers x2     Left forearm mass    Cervicalgia, thoracic pain, left shoulder pain     History of positive EUNICE in 2017  (+) arthritis,     GI/Hepatic:  - neg GI/hepatic ROS  (-) GERD   Renal/Genitourinary:  - neg Renal ROS     Endo:  - neg endo ROS     Psychiatric/Substance Use:  - neg psychiatric ROS   (+) Recreational drug usage: Cannabis (patient eats pot gummies. 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 ).    Infectious Disease:  - neg infectious disease ROS     Malignancy:  - neg malignancy ROS     Other:  - neg other ROS    (+) , H/O Chronic Pain,        Physical Exam    Airway  airway exam normal      Mallampati: I   TM distance: > 3 FB   Neck ROM: full   Mouth opening: > 3 cm    Respiratory Devices and Support         Dental  no notable dental history         Cardiovascular   cardiovascular exam normal       Rhythm and rate: regular and normal     Pulmonary   pulmonary exam normal        breath sounds clear to auscultation           OUTSIDE LABS:  CBC:   Lab Results   Component Value Date    WBC 7.5 09/27/2017    HGB 13.9 09/27/2017    HGB 15.6 05/16/2011    HCT 40.0 09/27/2017     09/27/2017     BMP:   Lab Results   Component Value Date    GLC 85 09/27/2017     COAGS:    Lab Results   Component Value Date    INR 0.95 09/27/2017     POC: No results found for: BGM, HCG, HCGS  HEPATIC:   Lab Results   Component Value Date    ALBUMIN 4.4 09/27/2017    PROTTOTAL 7.8 09/27/2017    ALT 29 09/27/2017    AST 26 09/27/2017    ALKPHOS 47 09/27/2017    BILITOTAL 0.5 09/27/2017     OTHER:   Lab Results   Component Value Date    A1C 4.9 09/27/2017       Anesthesia Plan    ASA Status:  3   NPO Status:  NPO Appropriate    Anesthesia Type: General.     - Airway: LMA   Induction: Intravenous, Propofol.   Maintenance: Balanced.        Consents    Anesthesia Plan(s) and associated risks, benefits, and realistic alternatives discussed. Questions answered and patient/representative(s) expressed understanding.     - Discussed with:  Patient    Use of blood products discussed: No .     Postoperative Care    Pain management: Multi-modal analgesia, Oral pain medications.   PONV prophylaxis: Ondansetron (or other 5HT-3), Dexamethasone or Solumedrol     Comments:              PAC Discussion and Assessment    ASA Classification: 2  Case is suitable for: ASC  Anesthetic techniques and relevant risks discussed: GA                  PAC Resident/NP Anesthesia Assessment: Astrid is a 60 year old woman who is scheduled for biopsy and possible removal tumor left forearm on 3/19/21 by Dr. 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  PAC referral for risk assessment and optimization for anesthesia with comorbid conditions of smoking, pulmonary nodule, COPD, allergies, history of positive EUNICE, Raynaud's, cervicalgia, thoracic pain and left shoulder pain:      Pre-operative considerations:   1.  Cardiac:  Functional status- METS >4. 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 She denies any cardiac symptoms. Low risk surgery with 0.4% (RCRI #) risk of major adverse cardiac event.  The patient had echo in 2011 with EF 60-65% and no  wall motion abnormalities. No further cardiac testing indicated.     2.  Pulm:  Airway feasible.  NOAH risk: Low (age)  ~ Smoking - patient smokes 0.5 ppd for the past 36 years. Smoking cessation was discussed for the DOS which the patient agrees to do.   ~ Pulmonary nodule - seen by Dr. Rivers who recommend surveillance with CT scan in 3 months  ~ COPD/ allergies - continue Spiriva and albuterol. She denies any allergy symptoms and no wheezing or shortness of breath.     3. GI:  Risk of PONV score = 2.  If > 2, anti-emetic intervention recommended.     4. Immunology: Raynaud's - s/p amputation of 2 fingers on right hand. She had a positive EUNICE in 2017 and was referred to rheumatology but lost her insurance so never followed up.     5. Musculoskeletal:  Patient recent seen by PCP for complaints of cervicalgia, thoracic pain and left shoulder pain - she will need to hold ibuprofen 24 hours prior and can continue Zanaflex and Tylenol. Consideration for careful positioning to minimize discomfort.     6. Other: The patient has 2 drinks of alcohol a day. She reports she is able to stop and has never had withdrawal symptoms. 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 She does occasionally eat pot gummies and will abstain from then for the day before and the day of surgery.     VTE risk: 0.26%    **Please refer to the physical examination documented by the anesthesiologist in the anesthesia record on the day of surgery**     Patient is optimized and is acceptable candidate for the proposed procedure.  No further diagnostic evaluation is needed.      For further details of assessment, testing, and physical exam please see H and P completed on same date     Mariama Agosto PA-C       Mid-Level Provider/Resident: Mariama Agosto PA-C  Date: 3/16/21                                 Mariama Agosto PA-C

## 2021-03-16 NOTE — PATIENT INSTRUCTIONS
Preparing for Your Surgery      Name:  Astrid Santoro   MRN:  [id number]   :  1960   Today's Date:  3/16/2021         Arriving for surgery:  Surgery date:  3/19/21  Arrival time:  7:30 am    Restrictions due to COVID 19:  One consistent visitor is allowed per patient  No ill visitors  All visitors must wear face mask     parking is available for anyone with mobility limitations or disabilities. (Monday- Friday 7 am- 5 pm)    Please come to:    [facility]  [address] [place] [id number]    Please check in on the 5th floor at the [facility]ulatory [facility] Center       What can I eat or drink?    -  You may eat and drink normally until 8 hours before surgery. (Until 1:00 am)  -  You may have clear liquids up to 4 hours before surgery. (Until 5:00 am)    Examples of clear liquids:  Water  Clear broth  Juices (apple, white grape, white cranberry  and cider) without pulp  Noncarbonated, powder based beverages  (lemonade and Eriberto-Aid)  Sodas (Sprite, 7-Up, ginger ale and seltzer)  Coffee or tea (without milk or cream)  Gatorade    --No alcohol for at least 24 hours before surgery    Which medicines can I take?    Hold Aspirin for 7 days before surgery.   Hold Multivitamins for 7 days before surgery.    **Hold Supplements (including FISH OIL, TURMERIC) for 7 days before surgery.**    Hold Ibuprofen (Advil, Motrin) for 1 day before surgery--unless otherwise directed by surgeon.  Hold Naproxen (Aleve) for 4 days before surgery.    **Hold marijuana gummies 24 hours before surgery.**      -  PLEASE TAKE the following medications the day of surgery:  Albuterol (Ventolin) Inhaler - bring inhalers with you to surgery  Tiotropium (Spiriva) Inhaler  Tizanidine (Zanaflex)   Tylenol Extra Strength if needed       How do I prepare myself?  - Please take 2 showers before surgery using Scrubcare or Hibiclens soap.    Use this soap only from the neck to your toes.     Leave the soap on your  skin for one minute--then rinse thoroughly.      You may use your own shampoo and conditioner; no other hair products.   - Please remove all jewelry and body piercings.  - No lotions, deodorants or fragrance.  - No makeup or fingernail polish.   - Bring your ID and insurance card.        - All patients are required to have a Covid-19 test within 4 days of surgery/procedure.      -Patients will be contacted by the [facility] scheduling team within 1 week of surgery to make an appointment.      - Patients may call the Scheduling team at [phone] if they have not been scheduled within 4 days of  surgery.      ALL PATIENTS ARE REQUIRED TO HAVE A RESPONSIBLE ADULT TO DRIVE AND BE IN ATTENDANCE WITH THEM FOR 24 HOURS FOLLOWING SURGERY        Questions or Concerns:    -For questions regarding the day of surgery please contact the Ambulatory Surgery Center at [phone].    -If you have health changes between today and your surgery please contact your surgeon.     For questions after surgery please call your surgeons office.

## 2021-03-16 NOTE — PROGRESS NOTES
Astrid is a 60 year old who is being evaluated via a billable video visit.      How would you like to obtain your AVS? MyChart    Will anyone else be joining your video visit? No    HPI         Review of Systems         Objective    Vitals - Patient Reported  Pain Score: Mild Pain (2)        Physical Exam     N Lee ELLIS

## 2021-03-17 ENCOUNTER — OFFICE VISIT (OUTPATIENT)
Dept: FAMILY MEDICINE | Facility: CLINIC | Age: 61
End: 2021-03-17
Payer: COMMERCIAL

## 2021-03-17 VITALS
WEIGHT: 135 LBS | DIASTOLIC BLOOD PRESSURE: 62 MMHG | BODY MASS INDEX: 24.84 KG/M2 | SYSTOLIC BLOOD PRESSURE: 108 MMHG | TEMPERATURE: 99.4 F | HEART RATE: 80 BPM | RESPIRATION RATE: 16 BRPM | HEIGHT: 62 IN

## 2021-03-17 DIAGNOSIS — N76.0 VAGINITIS AND VULVOVAGINITIS: Primary | ICD-10-CM

## 2021-03-17 DIAGNOSIS — N76.0 BV (BACTERIAL VAGINOSIS): ICD-10-CM

## 2021-03-17 DIAGNOSIS — L30.9 DERMATITIS: ICD-10-CM

## 2021-03-17 DIAGNOSIS — B96.89 BV (BACTERIAL VAGINOSIS): ICD-10-CM

## 2021-03-17 LAB
SPECIMEN SOURCE: ABNORMAL
WET PREP SPEC: ABNORMAL

## 2021-03-17 PROCEDURE — 87210 SMEAR WET MOUNT SALINE/INK: CPT | Performed by: NURSE PRACTITIONER

## 2021-03-17 PROCEDURE — 99213 OFFICE O/P EST LOW 20 MIN: CPT | Performed by: NURSE PRACTITIONER

## 2021-03-17 RX ORDER — CLINDAMYCIN PHOSPHATE 20 MG/G
1 CREAM VAGINAL AT BEDTIME
Qty: 40 G | Refills: 1 | Status: SHIPPED | OUTPATIENT
Start: 2021-03-17 | End: 2021-05-17

## 2021-03-17 RX ORDER — TRIAMCINOLONE ACETONIDE 5 MG/G
1 OINTMENT TOPICAL 2 TIMES DAILY
Qty: 15 G | Refills: 1 | Status: SHIPPED | OUTPATIENT
Start: 2021-03-17

## 2021-03-17 ASSESSMENT — MIFFLIN-ST. JEOR: SCORE: 1149.58

## 2021-03-17 NOTE — PROGRESS NOTES
Assessment & Plan     Vaginitis and vulvovaginitis  Done today ++ CLUE CELLS   - Wet prep    BV (bacterial vaginosis)  Symptomatic.  Start topical   - clindamycin (CLEOCIN) 2 % vaginal cream; Place 1 applicator vaginally At Bedtime  Not able to tolerate metronidzole due to previous allergic reaction    Dermatitis  Left posterior calf  Trial   - triamcinolone (KENALOG) 0.5 % external ointment; Apply 1 g topically 2 times daily      SHOULDER INJECTION LEFT 2 weeks post biopsy recommended.       15 minutes spent on the date of the encounter doing chart review, history and exam, documentation and further activities as noted above       Call or return to the clinic with any worsening of symptoms or no resolution. Patient/Parent verbalized understanding and is in agreement. Medication side effects reviewed.   Current Outpatient Medications   Medication Sig Dispense Refill     albuterol (VENTOLIN HFA) 108 (90 Base) MCG/ACT inhaler Inhale 2 puffs into the lungs every 6 hours (Patient taking differently: Inhale 2 puffs into the lungs every 6 hours as needed ) 3 Inhaler 3     clindamycin (CLEOCIN) 2 % vaginal cream Place 1 applicator vaginally At Bedtime 40 g 1     ibuprofen (ADVIL/MOTRIN) 200 MG capsule Take 400 mg by mouth 2 times daily       multivitamin w/minerals (MULTI-VITAMIN) tablet Take 1 tablet by mouth every morning       omega 3 1000 MG CAPS Take by mouth every morning       tiotropium (SPIRIVA RESPIMAT) 2.5 MCG/ACT inhaler Inhale 2 puffs into the lungs daily (Patient taking differently: Inhale 2 puffs into the lungs every morning ) 12 g 3     tiZANidine (ZANAFLEX) 2 MG tablet Take 1 tablet (2 mg) by mouth 3 times daily 45 tablet 1     triamcinolone (KENALOG) 0.5 % external ointment Apply 1 g topically 2 times daily 15 g 1     TURMERIC PO Take by mouth every morning       TYLENOL EXTRA STRENGTH OR Take by mouth 2 times daily        Chart documentation with Dragon Voice recognition Software. Although reviewed  "after completion, some words and grammatical errors may remain.    MARISSA Correa CNP  [facility]    Deb Resendiz is a 60 year old who presents for the following health issues     HPI     Musculoskeletal problem/pain  Onset/Duration: Months   Description  Location: shoulder - left  Joint Swelling: no  Redness: no  Pain: YES  Warmth: no  Intensity:  moderate  Progression of Symptoms:  same  Accompanying signs and symptoms:   Fevers: no  Numbness/tingling/weakness: no  History  Trauma to the area: no  Recent illness:  no  Previous similar problem: no  Previous evaluation:  YES- x ray  Precipitating or alleviating factors:  Aggravating factors include: overuse  Therapies tried and outcome: acetaminophen, Ibuprofen and muscle relaxer     Vaginal Symptoms      Duration: 3 days     Description  vaginal discharge - cloudy , yellow and itching    Intensity:  moderate    Accompanying signs and symptoms (fever/dysuria/abdominal or back pain): None    History  Sexually active: yes, single partner, contraception - none  Possibility of pregnancy: No  Recent antibiotic use: no     Therapies tried and outcome: none   Outcome: none           Review of Systems   Constitutional, HEENT, cardiovascular, pulmonary, GI, , musculoskeletal, neuro, skin, endocrine and psych systems are negative, except as otherwise noted.      Objective    /62 (Cuff Size: Adult Regular)   Pulse 80   Temp 99.4  F (37.4  C) (Tympanic)   Resp 16   Ht 1.581 m (5' 2.25\")   Wt 61.2 kg (135 lb)   LMP 02/27/2013   BMI 24.49 kg/m    Body mass index is 24.49 kg/m .  Physical Exam   GENERAL: healthy, alert and no distress  EYES: Eyes grossly normal to inspection, PERRL and conjunctivae and sclerae normal  HENT: ear canals and TM's normal, nose and mouth without ulcers or lesions  NECK: no adenopathy, no asymmetry, masses, or scars and thyroid normal to palpation  RESP: lungs clear to auscultation - no " rales, rhonchi or wheezes  CV: regular rate and rhythm, normal S1 S2, no S3 or S4, no murmur, click or rub, no peripheral edema and peripheral pulses strong  ABDOMEN: soft, nontender, no hepatosplenomegaly, no masses and bowel sounds normal  M limted range of motion left shoulder due to pain   mass left forearm growing in size and painful to touch   SKIN: dime size red rash left calf.   NEURO: Normal strength and tone, mentation intact and speech normal  PSYCH: mentation appears normal, affect normal/bright    Orders Only on 03/15/2021   Component Date Value Ref Range Status     COVID-19 Virus PCR to [facility] - So* 03/15/2021 Nasopharyngeal   Final     COVID-19 Virus PCR to [facility] - Re* 03/15/2021 Test received-See reflex to [facility]DL test SARS CoV2 (COVID-19) Virus RT-PCR   Final     SARS-CoV-2 Virus Specimen Source 03/15/2021 Nasopharyngeal   Final     SARS-CoV-2 PCR Result 03/15/2021 NEGATIVE   Final    SARS-CoV2 (COVID-19) RNA not detected, presumed negative.     SARS-CoV-2 PCR Comment 03/15/2021    Final                    Value:Testing was performed using the Aptima SARS-CoV-2 Assay on the [employer] Instrument System.   Additional information about this Emergency Use Authorization (EUA) assay can be found via   the Lab Guide.      Comment: This test should be ordered for the detection of SARS-CoV-2 in individuals who   meet SARS-CoV-2 clinical and/or epidemiological criteria. Test performance is   unknown in asymptomatic patients.  This test is for in vitro diagnostic use under the FDA EUA for laboratories   certified under CLIA to perform high complexity testing. This test has not   been FDA cleared or approved.  A negative result does not rule out the presence of PCR inhibitors in the   specimen or target RNA in concentration below the limit of detection for the   assay. The possibility of a false negative should be considered if the   patient's recent exposure or clinical presentation suggests COVID-19.  This test  was validated by the [facility] Infectious [facility]   Diagnostic Laboratory. This laboratory is certified under the Clinical   Laboratory Improvement Amendments of 1988 (CLIA-88) as qualified to perform   high complexity laboratory testing.

## 2021-03-17 NOTE — PATIENT INSTRUCTIONS
[facility] Schedule  [place] ~ [phone]  Every other Monday or Wednesday   & one Saturday morning a month    [place] ~ [phone]  Every Other Monday Afternoon    [place] ~ [phone]  Every Monday morning  Every Tuesday afternoon  Wed, Thurs, Friday morning & afternoon

## 2021-03-19 ENCOUNTER — ANESTHESIA (OUTPATIENT)
Dept: SURGERY | Facility: AMBULATORY SURGERY CENTER | Age: 61
End: 2021-03-19

## 2021-03-19 ENCOUNTER — HOSPITAL ENCOUNTER (OUTPATIENT)
Facility: AMBULATORY SURGERY CENTER | Age: 61
Discharge: HOME | End: 2021-03-19
Attending: ORTHOPAEDIC SURGERY | Admitting: ORTHOPAEDIC SURGERY
Payer: COMMERCIAL

## 2021-03-19 ENCOUNTER — NURSE TRIAGE (OUTPATIENT)
Dept: NURSING | Facility: CLINIC | Age: 61
End: 2021-03-19

## 2021-03-19 VITALS
RESPIRATION RATE: 20 BRPM | HEART RATE: 55 BPM | DIASTOLIC BLOOD PRESSURE: 68 MMHG | OXYGEN SATURATION: 98 % | BODY MASS INDEX: 24.84 KG/M2 | SYSTOLIC BLOOD PRESSURE: 105 MMHG | HEIGHT: 62 IN | WEIGHT: 135 LBS | TEMPERATURE: 98.8 F

## 2021-03-19 DIAGNOSIS — D21.12 BENIGN NEOPLASM OF SOFT TISSUES OF LEFT UPPER EXTREMITY: ICD-10-CM

## 2021-03-19 PROCEDURE — 25075 EXC FOREARM LES SC < 3 CM: CPT | Mod: LT

## 2021-03-19 PROCEDURE — 88307 TISSUE EXAM BY PATHOLOGIST: CPT | Performed by: PATHOLOGY

## 2021-03-19 RX ORDER — OXYCODONE HYDROCHLORIDE 5 MG/1
5 TABLET ORAL
Status: CANCELLED | OUTPATIENT
Start: 2021-03-19

## 2021-03-19 RX ORDER — NALOXONE HYDROCHLORIDE 0.4 MG/ML
0.2 INJECTION, SOLUTION INTRAMUSCULAR; INTRAVENOUS; SUBCUTANEOUS
Status: DISCONTINUED | OUTPATIENT
Start: 2021-03-19 | End: 2021-03-20 | Stop reason: HOSPADM

## 2021-03-19 RX ORDER — DEXAMETHASONE SODIUM PHOSPHATE 4 MG/ML
INJECTION, SOLUTION INTRA-ARTICULAR; INTRALESIONAL; INTRAMUSCULAR; INTRAVENOUS; SOFT TISSUE PRN
Status: DISCONTINUED | OUTPATIENT
Start: 2021-03-19 | End: 2021-03-19

## 2021-03-19 RX ORDER — ONDANSETRON 4 MG/1
4 TABLET, ORALLY DISINTEGRATING ORAL EVERY 30 MIN PRN
Status: DISCONTINUED | OUTPATIENT
Start: 2021-03-19 | End: 2021-03-20 | Stop reason: HOSPADM

## 2021-03-19 RX ORDER — PROPOFOL 10 MG/ML
INJECTION, EMULSION INTRAVENOUS CONTINUOUS PRN
Status: DISCONTINUED | OUTPATIENT
Start: 2021-03-19 | End: 2021-03-19

## 2021-03-19 RX ORDER — ACETAMINOPHEN 325 MG/1
975 TABLET ORAL ONCE
Status: COMPLETED | OUTPATIENT
Start: 2021-03-19 | End: 2021-03-19

## 2021-03-19 RX ORDER — ONDANSETRON 2 MG/ML
INJECTION INTRAMUSCULAR; INTRAVENOUS PRN
Status: DISCONTINUED | OUTPATIENT
Start: 2021-03-19 | End: 2021-03-19

## 2021-03-19 RX ORDER — LIDOCAINE HYDROCHLORIDE 20 MG/ML
INJECTION, SOLUTION INFILTRATION; PERINEURAL PRN
Status: DISCONTINUED | OUTPATIENT
Start: 2021-03-19 | End: 2021-03-19

## 2021-03-19 RX ORDER — NALOXONE HYDROCHLORIDE 0.4 MG/ML
0.4 INJECTION, SOLUTION INTRAMUSCULAR; INTRAVENOUS; SUBCUTANEOUS
Status: DISCONTINUED | OUTPATIENT
Start: 2021-03-19 | End: 2021-03-20 | Stop reason: HOSPADM

## 2021-03-19 RX ORDER — ACETAMINOPHEN 325 MG/1
650 TABLET ORAL
Status: CANCELLED | OUTPATIENT
Start: 2021-03-19

## 2021-03-19 RX ORDER — ONDANSETRON 2 MG/ML
4 INJECTION INTRAMUSCULAR; INTRAVENOUS EVERY 30 MIN PRN
Status: DISCONTINUED | OUTPATIENT
Start: 2021-03-19 | End: 2021-03-20 | Stop reason: HOSPADM

## 2021-03-19 RX ORDER — CEFAZOLIN SODIUM 2 G/50ML
2 SOLUTION INTRAVENOUS SEE ADMIN INSTRUCTIONS
Status: DISCONTINUED | OUTPATIENT
Start: 2021-03-19 | End: 2021-03-20 | Stop reason: HOSPADM

## 2021-03-19 RX ORDER — KETOROLAC TROMETHAMINE 30 MG/ML
INJECTION, SOLUTION INTRAMUSCULAR; INTRAVENOUS PRN
Status: DISCONTINUED | OUTPATIENT
Start: 2021-03-19 | End: 2021-03-19

## 2021-03-19 RX ORDER — PROPOFOL 10 MG/ML
INJECTION, EMULSION INTRAVENOUS PRN
Status: DISCONTINUED | OUTPATIENT
Start: 2021-03-19 | End: 2021-03-19

## 2021-03-19 RX ORDER — OXYCODONE HYDROCHLORIDE 5 MG/1
5 TABLET ORAL EVERY 4 HOURS PRN
Status: DISCONTINUED | OUTPATIENT
Start: 2021-03-19 | End: 2021-03-20 | Stop reason: HOSPADM

## 2021-03-19 RX ORDER — GABAPENTIN 300 MG/1
300 CAPSULE ORAL ONCE
Status: COMPLETED | OUTPATIENT
Start: 2021-03-19 | End: 2021-03-19

## 2021-03-19 RX ORDER — CEFAZOLIN SODIUM 2 G/50ML
2 SOLUTION INTRAVENOUS
Status: DISCONTINUED | OUTPATIENT
Start: 2021-03-19 | End: 2021-03-20 | Stop reason: HOSPADM

## 2021-03-19 RX ORDER — AMOXICILLIN 250 MG
1-2 CAPSULE ORAL 2 TIMES DAILY
Qty: 30 TABLET | Refills: 0 | Status: SHIPPED | OUTPATIENT
Start: 2021-03-19 | End: 2022-03-15

## 2021-03-19 RX ORDER — MEPERIDINE HYDROCHLORIDE 25 MG/ML
12.5 INJECTION INTRAMUSCULAR; INTRAVENOUS; SUBCUTANEOUS
Status: DISCONTINUED | OUTPATIENT
Start: 2021-03-19 | End: 2021-03-20 | Stop reason: HOSPADM

## 2021-03-19 RX ORDER — BUPIVACAINE HYDROCHLORIDE AND EPINEPHRINE 2.5; 5 MG/ML; UG/ML
INJECTION, SOLUTION INFILTRATION; PERINEURAL PRN
Status: DISCONTINUED | OUTPATIENT
Start: 2021-03-19 | End: 2021-03-19 | Stop reason: HOSPADM

## 2021-03-19 RX ORDER — SODIUM CHLORIDE, SODIUM LACTATE, POTASSIUM CHLORIDE, CALCIUM CHLORIDE 600; 310; 30; 20 MG/100ML; MG/100ML; MG/100ML; MG/100ML
INJECTION, SOLUTION INTRAVENOUS CONTINUOUS
Status: DISCONTINUED | OUTPATIENT
Start: 2021-03-19 | End: 2021-03-20 | Stop reason: HOSPADM

## 2021-03-19 RX ORDER — OXYCODONE HYDROCHLORIDE 5 MG/1
5 TABLET ORAL EVERY 4 HOURS PRN
Qty: 6 TABLET | Refills: 0 | Status: SHIPPED | OUTPATIENT
Start: 2021-03-19 | End: 2021-05-17

## 2021-03-19 RX ORDER — FENTANYL CITRATE 50 UG/ML
25-50 INJECTION, SOLUTION INTRAMUSCULAR; INTRAVENOUS
Status: DISCONTINUED | OUTPATIENT
Start: 2021-03-19 | End: 2021-03-20 | Stop reason: HOSPADM

## 2021-03-19 RX ORDER — ONDANSETRON 4 MG/1
4 TABLET, ORALLY DISINTEGRATING ORAL
Status: CANCELLED | OUTPATIENT
Start: 2021-03-19

## 2021-03-19 RX ORDER — LIDOCAINE 40 MG/G
CREAM TOPICAL
Status: DISCONTINUED | OUTPATIENT
Start: 2021-03-19 | End: 2021-03-20 | Stop reason: HOSPADM

## 2021-03-19 RX ORDER — SODIUM CHLORIDE, SODIUM LACTATE, POTASSIUM CHLORIDE, CALCIUM CHLORIDE 600; 310; 30; 20 MG/100ML; MG/100ML; MG/100ML; MG/100ML
INJECTION, SOLUTION INTRAVENOUS CONTINUOUS PRN
Status: DISCONTINUED | OUTPATIENT
Start: 2021-03-19 | End: 2021-03-19

## 2021-03-19 RX ORDER — FENTANYL CITRATE 50 UG/ML
INJECTION, SOLUTION INTRAMUSCULAR; INTRAVENOUS PRN
Status: DISCONTINUED | OUTPATIENT
Start: 2021-03-19 | End: 2021-03-19

## 2021-03-19 RX ADMIN — SODIUM CHLORIDE, SODIUM LACTATE, POTASSIUM CHLORIDE, CALCIUM CHLORIDE: 600; 310; 30; 20 INJECTION, SOLUTION INTRAVENOUS at 09:08

## 2021-03-19 RX ADMIN — PROPOFOL 150 MCG/KG/MIN: 10 INJECTION, EMULSION INTRAVENOUS at 09:16

## 2021-03-19 RX ADMIN — CEFAZOLIN SODIUM 2 G: 2 SOLUTION INTRAVENOUS at 09:23

## 2021-03-19 RX ADMIN — GABAPENTIN 300 MG: 300 CAPSULE ORAL at 07:39

## 2021-03-19 RX ADMIN — ACETAMINOPHEN 975 MG: 325 TABLET ORAL at 07:39

## 2021-03-19 RX ADMIN — DEXAMETHASONE SODIUM PHOSPHATE 4 MG: 4 INJECTION, SOLUTION INTRA-ARTICULAR; INTRALESIONAL; INTRAMUSCULAR; INTRAVENOUS; SOFT TISSUE at 09:16

## 2021-03-19 RX ADMIN — KETOROLAC TROMETHAMINE 30 MG: 30 INJECTION, SOLUTION INTRAMUSCULAR; INTRAVENOUS at 09:39

## 2021-03-19 RX ADMIN — LIDOCAINE HYDROCHLORIDE 80 MG: 20 INJECTION, SOLUTION INFILTRATION; PERINEURAL at 09:14

## 2021-03-19 RX ADMIN — PROPOFOL 170 MG: 10 INJECTION, EMULSION INTRAVENOUS at 09:12

## 2021-03-19 RX ADMIN — ONDANSETRON 4 MG: 2 INJECTION INTRAMUSCULAR; INTRAVENOUS at 09:16

## 2021-03-19 RX ADMIN — FENTANYL CITRATE 50 MCG: 50 INJECTION, SOLUTION INTRAMUSCULAR; INTRAVENOUS at 09:14

## 2021-03-19 ASSESSMENT — MIFFLIN-ST. JEOR: SCORE: 1151.96

## 2021-03-19 NOTE — ANESTHESIA CARE TRANSFER NOTE
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    Anesthesia Type:   General     Note:      Level of Consciousness: awake  Oxygen Supplementation: nasal cannula    Independent Airway: airway patency satisfactory and stable        Patient transferred to: PACU    Handoff Report: Identifed the Patient, Identified the Reponsible Provider, Reviewed the pertinent medical history, Discussed the surgical course, Reviewed Intra-OP anesthesia mangement and issues during anesthesia, Set expectations for post-procedure period and Allowed opportunity for questions and acknowledgement of understanding      Vitals: (Last set prior to Anesthesia Care Transfer)  CRNA VITALS  3/19/2021 0917 - 3/19/2021 0953      3/19/2021             EKG:  NSR        Electronically Signed By: MARISSA Vásquez CRNA  March 19, 2021  9:54 AM

## 2021-03-19 NOTE — OP NOTE
Preop diagnosis: Tumor left forearm    Postoperative diagnosis: Same    Procedure performed: Left forearm, subcutaneous, 2 cm    Surgeons: Itz Silva.    Estimated blood loss: 2 cc    Pathology submitted: Tumor left forearm in [facility] center was originally assessed over the video.  I met her this morning.  Risk and benefits surgery were again reviewed.  Surgical site was marked with my initials and line of intended incision.  The consent was signed.    Preoperative brief had been performed.  Patient was taken the operating room received a general anesthetic and in the supine position the left arm was prepped and draped sterilely.  Surgical timeout was performed.    Physical examination of the tumor in the preop area as well as the OR showed the lesion was highly suspicious for gout calcification type of lesion.  A 3 cm incision was made directly over the lesion.  Amorphous avascular mineralized tissue was encountered.  This was removed with a combination of sharp dissection and curetting.  Wound was then irrigated and closed with subcutaneous and skin layers.    Postoperative debrief was performed.    Plan: 1.  Follow-up with video visit in 2 to 3 weeks after histopathology is final.  2.  If crystals are not identified in the biopsy would consider referring back to her primary care clinic for metabolic evaluation

## 2021-03-19 NOTE — DISCHARGE INSTRUCTIONS
[facility]  Home Care Following Anesthesia  For 24 hours after surgery:  1. Get plenty of rest.  A responsible adult must stay with you for at least 24 hours after you leave the surgery center.  2. Do not drive or use heavy equipment.  If you have weakness or tingling, don't drive or use heavy equipment until this feeling goes away.   3. Do not drink alcohol.   4. Avoid strenuous or risky activities.  Ask for help when climbing stairs.  5. You may feel lightheaded.  IF so, sit for a few minutes before standing.  Have someone help you get up.   6. If you have nausea (feel sick to your stomach): Drink only clear liquids such as apple juice, ginger ale, broth or 7-Up.  Rest may also help.  Be sure to drink enough fluids.  Move to a regular diet as you feel able.   7. You may have a slight fever.  Call the doctor if your fever is over 100 F (37.7 C) (taken under the tongue) or lasts longer than 24 hours.  8. You may have a dry mouth, a sore throat, muscle aches or trouble sleeping. These should go away after 24 hours.  9. Do not make important or legal decisions.               Tips for taking pain medications  To get the best pain relief possible, remember these points:    Take pain medications as directed, before pain becomes severe.    Pain medication can upset your stomach: taking it with food may help.    Constipation is a common side effect of pain medication. Drink plenty of  fluids.    Eat foods high in fiber. Take a stool softener if recommended by your doctor or pharmacist.    Do not drink alcohol, drive or operate machinery while taking pain medications.    Ask about other ways to control pain, such as with heat, ice or relaxation.    Tylenol/Acetaminophen Consumption  To help encourage the safe use of acetaminophen, the makers of TYLENOL  have lowered the maximum daily dose for single-ingredient Extra Strength TYLENOL  (acetaminophen) products sold in the [place] from 8  pills per day (4,000 mg) to 6 pills per day (3,000 mg). The dosing interval has also changed from 2 pills every 4-6 hours to 2 pills every 6 hours.    If you feel your pain relief is insufficient, you may take Tylenol/Acetaminophen in addition to your narcotic pain medication.     Be careful not to exceed 3,000 mg of Tylenol/Acetaminophen in a 24 hour period from all sources.    If you are taking extra strength Tylenol/acetaminophen (500 mg), the maximum dose is 6 tablets in 24 hours.    If you are taking regular strength acetaminophen (325 mg), the maximum dose is 9 tablets in 24 hours.    ***975mg of Tylenol received at 07:40 am***    Call a doctor for any of the followin. Signs of infection (fever, growing tenderness at the surgery site, a large amount of drainage or bleeding, severe pain, foul-smelling drainage, redness, swelling).  2. It has been over 8 to 10 hours since surgery and you are still not able to urinate (pass water).  3. Headache for over 24 hours.  4. Numbness, tingling or weakness the day after surgery (if you had spinal anesthesia).  5. Signs of Covid-19 infection (temperature over 100 degrees, shortness of breath, cough, loss of taste/smell, generalized body aches, persistent headache, chills, sore throat, nausea/vomiting/diarrhea)  Your doctor is:  Dr. John Rahman, Orthopaedics: [phone]                    Or dial [phone] and ask for the resident on call for:  Orthopaedics  For emergency care, call the:  [facility] Emergency Department: [phone] (TTY for hearing impaired: [phone])

## 2021-03-19 NOTE — BRIEF OP NOTE
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 Rahman/JUANJO for wound check or virtual appt

## 2021-03-19 NOTE — ANESTHESIA POSTPROCEDURE EVALUATION
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    Anesthesia Type:  General    Note:  Disposition: Outpatient   Postop Pain Control: Uneventful            Sign Out: Well controlled pain   PONV: No   Neuro/Psych: Uneventful            Sign Out: Acceptable/Baseline neuro status   Airway/Respiratory: Uneventful            Sign Out: Acceptable/Baseline resp. status   CV/Hemodynamics: Uneventful            Sign Out: Acceptable CV status   Other NRE: NONE   DID A NON-ROUTINE EVENT OCCUR? No         Last vitals:  Vitals:    03/19/21 1000 03/19/21 1015 03/19/21 1030   BP: 105/63 103/62 105/68   Pulse: 55     Resp: 19 20 20   Temp: 36.5  C (97.7  F)  37.1  C (98.8  F)   SpO2: 98% 95% 98%       Last vitals prior to Anesthesia Care Transfer:  CRNA VITALS  3/19/2021 0917 - 3/19/2021 1017      3/19/2021             EKG:  NSR          Electronically Signed By: Jesús Gracia DO  March 19, 2021  2:07 PM

## 2021-03-19 NOTE — TELEPHONE ENCOUNTER
"Patient reports that she had surgery today and has a bandage on left forearm and is noting blood on the bandage.      Can see the blood on the outside, about 1\" long 2\" wide.  No other symptoms currently.     Wondering if this is OK. Was told to leave in place until Tuesday.     Advised to terry with pen on bandage current area and monitor to see if \"grows\".  If notices a large increase in size call back for re-evaluation.     Nohelia Connell, RN/EFREN [facility]    Additional Information    Negative: [1] Caller is not with the adult (patient) AND [2] reporting urgent symptoms    Negative: Lab result questions    Negative: Medication questions    Negative: Caller can't be reached by phone    Negative: Caller has already spoken to PCP or another triager    Negative: RN needs further essential information from caller in order to complete triage    Negative: Requesting regular office appointment    Negative: [1] Caller requesting NON-URGENT health information AND [2] PCP's office is the best resource    Health Information question, no triage required and triager able to answer question    Protocols used: INFORMATION ONLY CALL-A-AH      "

## 2021-03-24 LAB — COPATH REPORT: NORMAL

## 2021-03-26 DIAGNOSIS — D21.12 BENIGN NEOPLASM OF SOFT TISSUES OF LEFT UPPER EXTREMITY: Primary | ICD-10-CM

## 2021-04-03 ENCOUNTER — HEALTH MAINTENANCE LETTER (OUTPATIENT)
Age: 61
End: 2021-04-03

## 2021-04-06 ENCOUNTER — TELEPHONE (OUTPATIENT)
Dept: ORTHOPEDICS | Facility: CLINIC | Age: 61
End: 2021-04-06

## 2021-04-06 ENCOUNTER — VIRTUAL VISIT (OUTPATIENT)
Dept: ORTHOPEDICS | Facility: CLINIC | Age: 61
End: 2021-04-06
Payer: COMMERCIAL

## 2021-04-06 DIAGNOSIS — D21.6: ICD-10-CM

## 2021-04-06 DIAGNOSIS — J44.9 CHRONIC OBSTRUCTIVE PULMONARY DISEASE, UNSPECIFIED COPD TYPE (H): ICD-10-CM

## 2021-04-06 PROCEDURE — 99024 POSTOP FOLLOW-UP VISIT: CPT | Mod: 95 | Performed by: ORTHOPAEDIC SURGERY

## 2021-04-06 NOTE — NURSING NOTE
Chief Complaint   Patient presents with     Surgical Followup     2-3 wks post-op left forearm tumor removal DOS 3/19/21 // incision looks clean and dry without drainage per pt      Video Visit     video visit via [employer]       60 year old  1960               Pain Assessment  Patient Currently in Pain: No(no pain per pt)               [facility] [address] [place] [address] [address] [place], [place] - [address] - [address] [place] [address]        Allergies   Allergen Reactions     Shrimp Nausea and Vomiting     Metronidazole Rash           Current Outpatient Medications   Medication     albuterol (VENTOLIN HFA) 108 (90 Base) MCG/ACT inhaler     clindamycin (CLEOCIN) 2 % vaginal cream     ibuprofen (ADVIL/MOTRIN) 200 MG capsule     multivitamin w/minerals (MULTI-VITAMIN) tablet     omega 3 1000 MG CAPS     oxyCODONE (ROXICODONE) 5 MG tablet     senna-docusate (SENOKOT-S/PERICOLACE) 8.6-50 MG tablet     tiotropium (SPIRIVA RESPIMAT) 2.5 MCG/ACT inhaler     tiZANidine (ZANAFLEX) 2 MG tablet     triamcinolone (KENALOG) 0.5 % external ointment     TURMERIC PO     TYLENOL EXTRA STRENGTH OR     No current facility-administered medications for this visit.

## 2021-04-06 NOTE — TELEPHONE ENCOUNTER
RN called and left voice message to call me back or my chart me with where you would like to do additional lab work that Dr. Rahman would like you to have.

## 2021-04-06 NOTE — LETTER
4/6/2021         RE: Astrid Santoro  [address], [place] [address]        Dear Colleague,    Thank you for referring your patient, Astrid Santoro, to the [facility] [place]. Please see a copy of my visit note below.    Diagnosis: Tumoral calcinosis, forearm    Procedure performed: Biopsy and excision.    Effort to connect through [employer] was unsuccessful.  This was a phone visit.    Has returned to work and is feeling well.  She reports her wound is healed.  She is going to clip the ends of her dissolvable suture today.    I reviewed the description of the biopsy which is that of fibrous tissue with amorphous calcific deposits and foreign body reaction.  The comment suggest that it could represent tumoral calcinosis.  I explained this to Astrid and recommended that we also perform some blood test for calcium phosphorus and alkaline phosphatase.  She would like to proceed and would like this performed closer to home preferably [place].    Impression: Doing well no evidence of neoplasm.    Plan: 1.  Marcela to arrange in [place] for testing of alkaline phosphatase, calcium and phosphorus serum measurements.  2.  After these values are complete we should ask her to return to her primary if care physician to see if they have any other evaluation which they would recommend and me.          John Rahman MD

## 2021-04-06 NOTE — PROGRESS NOTES
Diagnosis: Tumoral calcinosis, forearm    Procedure performed: Biopsy and excision.    Effort to connect through [employer] was unsuccessful.  This was a phone visit.    Has returned to work and is feeling well.  She reports her wound is healed.  She is going to clip the ends of her dissolvable suture today.    I reviewed the description of the biopsy which is that of fibrous tissue with amorphous calcific deposits and foreign body reaction.  The comment suggest that it could represent tumoral calcinosis.  I explained this to Astrid and recommended that we also perform some blood test for calcium phosphorus and alkaline phosphatase.  She would like to proceed and would like this performed closer to home preferably [place].    Impression: Doing well no evidence of neoplasm.    Plan: 1.  Marcela to arrange in [place] for testing of alkaline phosphatase, calcium and phosphorus serum measurements.  2.  After these values are complete we should ask her to return to her primary if care physician to see if they have any other evaluation which they would recommend and me.

## 2021-04-06 NOTE — TELEPHONE ENCOUNTER
[facility] Call Center    Phone Message    May a detailed message be left on voicemail: yes     Reason for Call: Other: Patient called back stating that she would like to have the lab work done in the [facility] clinic if able, if not there her second choice would be the [facility] clinic. Please connect with patient to confirm availability.     Action Taken: Message routed to:  [facility] ([facility] ortho    Travel Screening: Not Applicable

## 2021-04-09 ENCOUNTER — TELEPHONE (OUTPATIENT)
Dept: ORTHOPEDICS | Facility: CLINIC | Age: 61
End: 2021-04-09

## 2021-04-09 NOTE — TELEPHONE ENCOUNTER
RN called and asked Astrid where she would like her lab work completed at.j  She would like to do this at the PCP office, which is a  facility. RN informed her to call and get an appointment for labs.  They should be able to see our orders.  She will do this.

## 2021-04-16 DIAGNOSIS — D21.12 BENIGN NEOPLASM OF SOFT TISSUES OF LEFT UPPER EXTREMITY: ICD-10-CM

## 2021-04-16 DIAGNOSIS — D21.6: ICD-10-CM

## 2021-04-16 LAB
ALP SERPL-CCNC: 55 U/L (ref 40–150)
CALCIUM SERPL-MCNC: 9.4 MG/DL (ref 8.5–10.1)
PHOSPHATE SERPL-MCNC: 3.8 MG/DL (ref 2.5–4.5)

## 2021-04-16 PROCEDURE — 84100 ASSAY OF PHOSPHORUS: CPT | Performed by: ORTHOPAEDIC SURGERY

## 2021-04-16 PROCEDURE — 36415 COLL VENOUS BLD VENIPUNCTURE: CPT | Performed by: ORTHOPAEDIC SURGERY

## 2021-04-16 PROCEDURE — 82310 ASSAY OF CALCIUM: CPT | Performed by: ORTHOPAEDIC SURGERY

## 2021-04-16 PROCEDURE — 84075 ASSAY ALKALINE PHOSPHATASE: CPT | Performed by: ORTHOPAEDIC SURGERY

## 2021-04-26 ENCOUNTER — TELEPHONE (OUTPATIENT)
Dept: ORTHOPEDICS | Facility: CLINIC | Age: 61
End: 2021-04-26

## 2021-04-26 NOTE — TELEPHONE ENCOUNTER
RN called and left a voice message for Tiana.  All labs normal, please follow up with PCP      John Rahman MD Chapman-Shultz, Dixie, RN             Please call the patient with the results. Inform her all labs normal. Follow up with primary physician       John Rahman MD    Associated Results    Phosphorus  Order: [id number]  Status:  Final result   Visible to patient:  Yes (MyChart)   Dx:  Benign neoplasm of soft tissue of torso   Ref Range & Units 10d ago   Phosphorus 2.5 - 4.5 mg/dL 3.8            Alkaline Phosphatase 40 - 150 U/L 55  47    [place]ing [place]  [facility]         Specimen Collected: 04/16/21  3:11 PM Last Resulted: 04/16/21  9:45 PM        Calcium 8.5 - 10.1 mg/dL 9.4    Resulting Agency  [facility]         Specimen Collected: 04/16/21  3:11 PM Last Resulted: 04/16/21  9:37 PM

## 2021-05-14 ENCOUNTER — HOSPITAL ENCOUNTER (OUTPATIENT)
Dept: CT IMAGING | Facility: CLINIC | Age: 61
Discharge: HOME | End: 2021-05-14
Attending: INTERNAL MEDICINE | Admitting: INTERNAL MEDICINE
Payer: COMMERCIAL

## 2021-05-14 DIAGNOSIS — R91.1 LUNG NODULE: ICD-10-CM

## 2021-05-14 PROCEDURE — 71250 CT THORAX DX C-: CPT

## 2021-05-17 ENCOUNTER — VIRTUAL VISIT (OUTPATIENT)
Dept: PULMONOLOGY | Facility: CLINIC | Age: 61
End: 2021-05-17
Attending: INTERNAL MEDICINE
Payer: COMMERCIAL

## 2021-05-17 DIAGNOSIS — R91.1 LUNG NODULE: Primary | ICD-10-CM

## 2021-05-17 DIAGNOSIS — F17.210 CIGARETTE NICOTINE DEPENDENCE, UNCOMPLICATED: ICD-10-CM

## 2021-05-17 PROCEDURE — 99214 OFFICE O/P EST MOD 30 MIN: CPT | Mod: 95 | Performed by: INTERNAL MEDICINE

## 2021-05-17 NOTE — PROGRESS NOTES
"Astrid is a 60 year old who is being evaluated via a billable video visit.      How would you like to obtain your AVS? MyChart  If the video visit is dropped, the invitation should be resent by: Send to e-mail at: [email or web address]  Will anyone else be joining your video visit? No     Vitals - Patient Reported  Weight (Patient Reported): 58.1 kg (128 lb)  Height (Patient Reported): 158.5 cm (5' 2.4\")  BMI (Based on Pt Reported Ht/Wt): 23.11  Pain Score: No Pain (0)    Brea Hannaita JOSEFINA        Video Start Time: 358  Video-Visit Details    Type of service:  Video Visit    Video End Time:4:01 PM    Originating Location (pt. Location): Home    Distant Location (provider location):  [facility]     Platform used for Video Visit: Yousuf     60-year-old woman with a long history of cigarette smoking referred here for pulmonary nodules.  She has not had any changes in her lung health since her last visit.    Chest CT reviewed and interpreted by me: No changes in her lung nodules.  Calcification pattern is benign.  This is radiology's opinion as well.    I think the likelihood that these nodules are cancer is very very low.    She is appropriate for lung cancer screening.  She would like to continue this at her primary care clinic.    We discussed smoking cessation.  She is contemplative at this time.  I invited her to contact me or her primary care provider if she would like more information.    History reviewed    Medications and allergies reviewed    On exam  Good spirits  No anxiety  Normal pattern of breathing  Normal voice    Chest CT reviewed interpreted by me: Multiple bilateral nodules with some calcification.    I communicated my recommendations to MARISSA Rush for follow-up.    MD Inocencio Simeon MD  "

## 2021-05-17 NOTE — LETTER
"5/17/2021       RE: Astrid Santoro  [address] [address]     Dear Colleague,    Thank you for referring your patient, Astrid Santoro, to the [facility] at [facility]. Please see a copy of my visit note below.    Astrid is a 60 year old who is being evaluated via a billable video visit.      How would you like to obtain your AVS? MyChart  If the video visit is dropped, the invitation should be resent by: Send to e-mail at: [email or web address]  Will anyone else be joining your video visit? No     Vitals - Patient Reported  Weight (Patient Reported): 58.1 kg (128 lb)  Height (Patient Reported): 158.5 cm (5' 2.4\")  BMI (Based on Pt Reported Ht/Wt): 23.11  Pain Score: No Pain (0)    Brea ROCHA        Video Start Time: 358  Video-Visit Details    Type of service:  Video Visit    Video End Time:4:01 PM    Originating Location (pt. Location): Home    Distant Location (provider location):  [facility]     Platform used for Video Visit: [employer]Well     60-year-old woman with a long history of cigarette smoking referred here for pulmonary nodules.  She has not had any changes in her lung health since her last visit.    Chest CT reviewed and interpreted by me: No changes in her lung nodules.  Calcification pattern is benign.  This is radiology's opinion as well.    I think the likelihood that these nodules are cancer is very very low.    She is appropriate for lung cancer screening.  She would like to continue this at her primary care clinic.    We discussed smoking cessation.  She is contemplative at this time.  I invited her to contact me or her primary care provider if she would like more information.    History reviewed    Medications and allergies reviewed    On exam  Good spirits  No anxiety  Normal pattern of breathing  Normal voice    Chest CT reviewed interpreted by me: Multiple bilateral nodules with " some calcification.    I communicated my recommendations to MARISSA Rush for follow-up.    Inocencio Rivers MD

## 2021-05-19 ENCOUNTER — OFFICE VISIT (OUTPATIENT)
Dept: URGENT CARE | Facility: URGENT CARE | Age: 61
End: 2021-05-19
Payer: COMMERCIAL

## 2021-05-19 VITALS
HEIGHT: 62 IN | RESPIRATION RATE: 16 BRPM | BODY MASS INDEX: 24.4 KG/M2 | SYSTOLIC BLOOD PRESSURE: 104 MMHG | WEIGHT: 132.6 LBS | DIASTOLIC BLOOD PRESSURE: 70 MMHG | TEMPERATURE: 98.4 F | HEART RATE: 74 BPM | OXYGEN SATURATION: 96 %

## 2021-05-19 DIAGNOSIS — R30.0 DYSURIA: Primary | ICD-10-CM

## 2021-05-19 LAB
ALBUMIN UR-MCNC: NEGATIVE MG/DL
APPEARANCE UR: CLEAR
BILIRUB UR QL STRIP: NEGATIVE
COLOR UR AUTO: YELLOW
GLUCOSE UR STRIP-MCNC: NEGATIVE MG/DL
HGB UR QL STRIP: NEGATIVE
KETONES UR STRIP-MCNC: NEGATIVE MG/DL
LEUKOCYTE ESTERASE UR QL STRIP: ABNORMAL
NITRATE UR QL: NEGATIVE
NON-SQ EPI CELLS #/AREA URNS LPF: NORMAL /LPF
PH UR STRIP: 5.5 PH (ref 5–7)
RBC #/AREA URNS AUTO: NORMAL /HPF
SOURCE: ABNORMAL
SP GR UR STRIP: 1.01 (ref 1–1.03)
SPECIMEN SOURCE: NORMAL
UROBILINOGEN UR STRIP-ACNC: 0.2 EU/DL (ref 0.2–1)
WBC #/AREA URNS AUTO: NORMAL /HPF
WET PREP SPEC: NORMAL

## 2021-05-19 PROCEDURE — 87210 SMEAR WET MOUNT SALINE/INK: CPT | Performed by: NURSE PRACTITIONER

## 2021-05-19 PROCEDURE — 87086 URINE CULTURE/COLONY COUNT: CPT | Performed by: NURSE PRACTITIONER

## 2021-05-19 PROCEDURE — 81001 URINALYSIS AUTO W/SCOPE: CPT | Performed by: NURSE PRACTITIONER

## 2021-05-19 PROCEDURE — 99213 OFFICE O/P EST LOW 20 MIN: CPT | Performed by: NURSE PRACTITIONER

## 2021-05-19 ASSESSMENT — MIFFLIN-ST. JEOR: SCORE: 1141.07

## 2021-05-19 NOTE — PATIENT INSTRUCTIONS
We will culture your urine and if a bacteria grows that needs treatment we will contact you to do so.    Follow-up with your primary care provider next week and as needed.    Indications for emergent return to emergency department discussed with patient, who verbalized good understanding and agreement.  Patient understands the limitations of today's evaluation.         Patient Education     Dysuria with Uncertain Cause (Adult)    The urethra is the tube that allows urine to pass out of the body. In a woman, the urethra is the opening above the vagina. In men, the urethra is the opening on the tip of the penis. Dysuria is the feeling of pain or burning in the urethra when passing urine.   Dysuria can be caused by anything that irritates or inflames the urethra. An infection or chemical irritation can cause this reaction. A bladder infection is the most common cause of dysuria in adults. A urine test can diagnose this. A bladder infection needs antibiotic treatment.   Soaps, lotions, colognes, and feminine hygiene products can cause dysuria. So can birth control jellies, creams, and foams. It will go away 1 to 3 days after using these irritants.   Sexually transmitted infections (STIs) such as chlamydia or gonorrhea can cause dysuria. Your healthcare provider may take a culture sample. Your provider may start you on antibiotic medicine before the culture test returns.   In women who have gone through menopause, dysuria can be from dryness in the lining of the urethra. This can be treated with hormones. Dysuria becomes long-term (chronic) when it lasts for weeks or months. You may need to see a specialist (urologist) to diagnose and treat chronic dysuria.   Home care  These home care tips may help:    Don't use any chemicals or products that you think may be causing your symptoms.    If you were given a prescription medicine, take as directed. Take it until it is all used up.    If a culture was taken, don't have sex  until you have been told that it is negative. A negative culture means you don't have an infection. Then follow your healthcare provider's advice to treat your condition.  If a culture was done and it is positive:     Both you and your sexual partner may need to be treated. This is true even if your partner has no symptoms.    Contact your healthcare provider or go to an urgent care clinic or the public health department to be looked at and treated.    Don't have sex until both you and your partner have finished all antibiotics and your healthcare provider says you are no longer contagious.    Learn about and use safe sex practices. The safest sex is with a partner who has tested negative and only has sex with you. Condoms can prevent STIs from spreading, but they aren't a guarantee.  Follow-up care  Follow up with your healthcare provider, or as advised. If a culture was taken, you may call as directed for the results. If you have an STI, follow up with your provider or the public health department for a complete STI screening, including HIV testing. For more information, contact CDC-INFO at 633-648-5776.   When to get medical advice  Call your healthcare provider right away if any of these occur:    You aren't better after 3 days of treatment    Fever of 100.4 F (38 C) or higher, or as directed by your provider    Back or belly pain that gets worse    You can't urinate because of pain    New discharge from the urethra, vagina, or penis    Painful sores on the penis    Rash or joint pain    Painful lumps (lymph nodes) in the groin    Testicle pain or swelling of the scrotum  [employer] last reviewed this educational content on 10/1/2019    6128-0754 [employer]. All rights reserved. This information is not intended as a substitute for professional medical care. Always follow your healthcare professional's instructions.

## 2021-05-19 NOTE — PROGRESS NOTES
Assessment & Plan   Problem List Items Addressed This Visit     None      Visit Diagnoses     Dysuria    -  Primary    Relevant Orders    Wet prep (Completed)    *UA reflex to Microscopic and Culture ([place] and [facility] (except [place] and [place]) (Completed)    Urine Microscopic (Completed)    Urine Culture Aerobic Bacterial (Completed)             20 minutes spent on the date of the encounter doing chart review, history and exam, documentation and further activities per the note       Patient Instructions   We will culture your urine and if a bacteria grows that needs treatment we will contact you to do so.    Follow-up with your primary care provider next week and as needed.    Indications for emergent return to emergency department discussed with patient, who verbalized good understanding and agreement.  Patient understands the limitations of today's evaluation.         Patient Education     Dysuria with Uncertain Cause (Adult)    The urethra is the tube that allows urine to pass out of the body. In a woman, the urethra is the opening above the vagina. In men, the urethra is the opening on the tip of the penis. Dysuria is the feeling of pain or burning in the urethra when passing urine.   Dysuria can be caused by anything that irritates or inflames the urethra. An infection or chemical irritation can cause this reaction. A bladder infection is the most common cause of dysuria in adults. A urine test can diagnose this. A bladder infection needs antibiotic treatment.   Soaps, lotions, colognes, and feminine hygiene products can cause dysuria. So can birth control jellies, creams, and foams. It will go away 1 to 3 days after using these irritants.   Sexually transmitted infections (STIs) such as chlamydia or gonorrhea can cause dysuria. Your healthcare provider may take a culture sample. Your provider may start you on antibiotic medicine before the culture test returns.   In women who have gone  through menopause, dysuria can be from dryness in the lining of the urethra. This can be treated with hormones. Dysuria becomes long-term (chronic) when it lasts for weeks or months. You may need to see a specialist (urologist) to diagnose and treat chronic dysuria.   Home care  These home care tips may help:    Don't use any chemicals or products that you think may be causing your symptoms.    If you were given a prescription medicine, take as directed. Take it until it is all used up.    If a culture was taken, don't have sex until you have been told that it is negative. A negative culture means you don't have an infection. Then follow your healthcare provider's advice to treat your condition.  If a culture was done and it is positive:     Both you and your sexual partner may need to be treated. This is true even if your partner has no symptoms.    Contact your healthcare provider or go to an urgent care clinic or the public health department to be looked at and treated.    Don't have sex until both you and your partner have finished all antibiotics and your healthcare provider says you are no longer contagious.    Learn about and use safe sex practices. The safest sex is with a partner who has tested negative and only has sex with you. Condoms can prevent STIs from spreading, but they aren't a guarantee.  Follow-up care  Follow up with your healthcare provider, or as advised. If a culture was taken, you may call as directed for the results. If you have an STI, follow up with your provider or the public health department for a complete STI screening, including HIV testing. For more information, contact CDC-INFO at 648-440-7626.   When to get medical advice  Call your healthcare provider right away if any of these occur:    You aren't better after 3 days of treatment    Fever of 100.4 F (38 C) or higher, or as directed by your provider    Back or belly pain that gets worse    You can't urinate because of  "pain    New discharge from the urethra, vagina, or penis    Painful sores on the penis    Rash or joint pain    Painful lumps (lymph nodes) in the groin    Testicle pain or swelling of the scrotum  [employer] last reviewed this educational content on 10/1/2019    9341-7017 [employer]. All rights reserved. This information is not intended as a substitute for professional medical care. Always follow your healthcare professional's instructions.               Return in about 1 day (around 5/20/2021), or if symptoms worsen or fail to improve, for Lab Work, Follow up with your primary care provider.    MARISSA Harrison CNP  [facility]    Deb Resendiz is a 60 year old who presents for the following health issues     HPI     Chief Complaint   Patient presents with     Vaginal Problem     3-4 Patient is having itching, pressure in abdomen, burning.           Review of Systems   Constitutional, HEENT, cardiovascular, pulmonary, GI, , musculoskeletal, neuro, skin, endocrine and psych systems are negative, except as otherwise noted.      Objective    /70 (BP Location: Right arm, Patient Position: Sitting, Cuff Size: Adult Regular)   Pulse 74   Temp 98.4  F (36.9  C) (Tympanic)   Resp 16   Ht 1.585 m (5' 2.4\")   Wt 60.1 kg (132 lb 9.6 oz)   LMP 02/27/2013   SpO2 96%   BMI 23.94 kg/m    Body mass index is 23.94 kg/m .  Physical Exam   GENERAL: healthy, alert and no distress, nontoxic  EYES: Eyes grossly normal to inspection, PERRL and conjunctivae and sclerae normal  HENT: normocephalic  NECK: supple with full ROM  RESP: lungs clear to auscultation - no rales, rhonchi or wheezes  CV: regular rate and rhythm, normal S1 S2, no S3 or S4, no murmur, click or rub, no peripheral edema   ABDOMEN: soft, nontender, no hepatosplenomegaly, no masses and bowel sounds normal  MS: no gross musculoskeletal defects noted, no edema  Neg CVA tenderness    Results for " orders placed or performed in visit on 05/19/21 (from the past 24 hour(s))   Wet prep    Specimen: Vagina   Result Value Ref Range    Specimen Description Vagina     Wet Prep No yeast seen     Wet Prep No clue cells seen     Wet Prep No Trichomonas seen     Wet Prep No WBC's seen    *UA reflex to Microscopic and Culture ([place] [facility] (except [place] and [place])    Specimen: Urine   Result Value Ref Range    Color Urine Yellow     Appearance Urine Clear     Glucose Urine Negative NEG^Negative mg/dL    Bilirubin Urine Negative NEG^Negative    Ketones Urine Negative NEG^Negative mg/dL    Specific Gravity Urine 1.015 1.003 - 1.035    Blood Urine Negative NEG^Negative    pH Urine 5.5 5.0 - 7.0 pH    Protein Albumin Urine Negative NEG^Negative mg/dL    Urobilinogen Urine 0.2 0.2 - 1.0 EU/dL    Nitrite Urine Negative NEG^Negative    Leukocyte Esterase Urine Trace (A) NEG^Negative    Source Urine    Urine Microscopic   Result Value Ref Range    WBC Urine 0 - 5 OTO5^0 - 5 /HPF    RBC Urine O - 2 OTO2^O - 2 /HPF    Squamous Epithelial /LPF Urine Few FEW^Few /LPF

## 2021-05-20 LAB
BACTERIA SPEC CULT: NO GROWTH
Lab: NORMAL
SPECIMEN SOURCE: NORMAL

## 2021-06-04 NOTE — TELEPHONE ENCOUNTER
Astrid's BC/BS Plan has Prempro as Non-Formulary. Please call [phone] for P.A. Info or Alternatives.  Her ID# [id number]  Thank you    [facility]    
Tiffanie:    Please see alternative formulary options covered by insurance and advise on new prescription.    Thank you    Tierra CRANE RN    
prempro not covered.  Preferred per online formulary:     PROGESTINS   medroxyprogesterone acetate tab2.5 mg (Provera)   medroxyprogesterone acetate tab5 mg (Provera)   medroxyprogesterone acetate tab10 mg (Provera)   norethindrone acetate tab 5 mg(Aygestin)   progesterone im in oil 50 mg/ml   progesterone micronized cap 100 mg(Prometrium)   progesterone micronized cap 200 mg(     ESTROGENS   COMBIPATCH   estradiol-norethindrone ace td pttw 0.05-0.14mg/day   COMBIPATCH   estradiol-norethindrone ace td pttw 0.05-0.25mg/day   estradiol & norethindrone acetatetab 0.5-0.1 mg (Activella)   estradiol & norethindrone acetatetab 1-0.5 mg (Activella)   estradiol tab 0.5 mg (Estrace)   estradiol tab 1 mg (Estrace)   estradiol tab 2 mg (Estrace)   estradiol td patch twice weekly0.025 mg/24hr (Vivelle-dot)   estradiol td patch twice weekly0.0375 mg/24hr (Vivelle-dot)   estradiol td patch twice weekly0.05 mg/24hr (Vivelle-dot)   estradiol td patch twice weekly0.075 mg/24hr (Vivelle-dot)   estradiol td patch twice weekly0.1 mg/24hr (Vivelle-dot)   estradiol td patch weekly0.025 mg/24hr (Climara       estradiol td patch weekly0.0375 mg/24hr (37.5 mcg/24hr)(Climara)   estradiol td patch weekly0.05 mg/24hr (Climara)   estradiol td patch weekly0.06 mg/24hr (Climara)   estradiol td patch weekly0.075 mg/24hr (Climara)   estradiol td patch weekly0.1 mg/24hr (Climara)   estradiol valerate im in oil 20 mg/ml(Delestrogen)   estradiol valerate im in oil 40 mg/ml(Delestrogen)   ESTROGEL   estradiol gel 0.06% (0.75mg/1.25 gm metered-dose pump)   ESTROPIPATE   estropipate tab 0.75mg   ESTROPIPATE   estropipate tab 1.5mg   ESTROPIPATE   estropipate tab 3 mg   estropipate tab 0.75 mg   estropipate tab 1.5 mg       
Statement Selected

## 2021-06-13 ENCOUNTER — MYC MEDICAL ADVICE (OUTPATIENT)
Dept: FAMILY MEDICINE | Facility: CLINIC | Age: 61
End: 2021-06-13

## 2021-06-13 DIAGNOSIS — J44.9 CHRONIC OBSTRUCTIVE PULMONARY DISEASE, UNSPECIFIED COPD TYPE (H): ICD-10-CM

## 2021-06-14 RX ORDER — ALBUTEROL SULFATE 90 UG/1
2 AEROSOL, METERED RESPIRATORY (INHALATION) EVERY 6 HOURS PRN
Qty: 54 G | Refills: 1 | Status: SHIPPED | OUTPATIENT
Start: 2021-06-14 | End: 2021-12-14

## 2021-09-18 ENCOUNTER — HEALTH MAINTENANCE LETTER (OUTPATIENT)
Age: 61
End: 2021-09-18

## 2021-11-16 ENCOUNTER — MYC MEDICAL ADVICE (OUTPATIENT)
Dept: FAMILY MEDICINE | Facility: CLINIC | Age: 61
End: 2021-11-16
Payer: COMMERCIAL

## 2021-11-18 ENCOUNTER — LAB (OUTPATIENT)
Dept: FAMILY MEDICINE | Facility: CLINIC | Age: 61
End: 2021-11-18
Attending: FAMILY MEDICINE
Payer: COMMERCIAL

## 2021-11-18 ENCOUNTER — VIRTUAL VISIT (OUTPATIENT)
Dept: FAMILY MEDICINE | Facility: CLINIC | Age: 61
End: 2021-11-18
Payer: COMMERCIAL

## 2021-11-18 DIAGNOSIS — U07.1 INFECTION DUE TO 2019 NOVEL CORONAVIRUS: ICD-10-CM

## 2021-11-18 DIAGNOSIS — U07.1 INFECTION DUE TO 2019 NOVEL CORONAVIRUS: Primary | ICD-10-CM

## 2021-11-18 PROCEDURE — U0003 INFECTIOUS AGENT DETECTION BY NUCLEIC ACID (DNA OR RNA); SEVERE ACUTE RESPIRATORY SYNDROME CORONAVIRUS 2 (SARS-COV-2) (CORONAVIRUS DISEASE [COVID-19]), AMPLIFIED PROBE TECHNIQUE, MAKING USE OF HIGH THROUGHPUT TECHNOLOGIES AS DESCRIBED BY CMS-2020-01-R: HCPCS | Mod: 90

## 2021-11-18 PROCEDURE — U0005 INFEC AGEN DETEC AMPLI PROBE: HCPCS | Mod: 90

## 2021-11-18 PROCEDURE — 99000 SPECIMEN HANDLING OFFICE-LAB: CPT

## 2021-11-18 PROCEDURE — 99213 OFFICE O/P EST LOW 20 MIN: CPT | Mod: 95 | Performed by: FAMILY MEDICINE

## 2021-11-18 RX ORDER — PREDNISONE 20 MG/1
40 TABLET ORAL DAILY
Qty: 10 TABLET | Refills: 0 | Status: SHIPPED | OUTPATIENT
Start: 2021-11-18 | End: 2021-11-23

## 2021-11-18 RX ORDER — BENZONATATE 100 MG/1
100 CAPSULE ORAL 3 TIMES DAILY PRN
Qty: 42 CAPSULE | Refills: 0 | Status: SHIPPED | OUTPATIENT
Start: 2021-11-18 | End: 2021-12-14

## 2021-11-18 NOTE — PROGRESS NOTES
Astrid is a 61 year old who is being evaluated via a billable telephone visit.      What phone number would you like to be contacted at? [phone]  How would you like to obtain your AVS? MyChart    Assessment & Plan     Infection due to 2019 novel coronavirus  Symptomatic therapy suggested: push fluids, rest, gargle warm salt water, use vaporizer or mist needed , use acetaminophen, ibuprofen, antihistamine-decongestant of choice as needed and Return office visit if symptoms persist or worsen.   See AVS  - Symptomatic COVID-19 Virus (Coronavirus) by PCR; Future  - predniSONE (DELTASONE) 20 MG tablet; Take 2 tablets (40 mg) by mouth daily for 5 days  - benzonatate (TESSALON) 100 MG capsule; Take 1 capsule (100 mg) by mouth 3 times daily as needed for cough      10 minutes spent on the date of the encounter doing chart review, history and exam, documentation and further activities per the note       Tobacco Cessation:   reports that she has been smoking cigarettes. She has a 18.00 pack-year smoking history. She has never used smokeless tobacco.  Tobacco Cessation Action Plan: Information offered: Patient not interested at this time        Return if symptoms worsen or fail to improve.    Garland Shelton MD  [facility] YOCASTA Resendiz is a 61 year old who presents for the following health issues  accompanied by her self.    HPI       Concern for COVID-19  About how many days ago did these symptoms start? Sunday  Is this your first visit for this illness? Yes  In the 14 days before your symptoms started, have you had close contact with someone with COVID-19 (Coronavirus)? Unsure, but co-worker has been out for a week   Do you have a fever or chills? No  Are you having new or worsening difficulty breathing? No  Do you have new or worsening cough? Yes, I am coughing up mucus.  Have you had any new or unexplained body aches? No    Have you experienced any of the following NEW  symptoms?    Headache: YES    Sore throat: No    Loss of taste or smell: YES    Chest pain: No    Diarrhea: YES    Rash: No  What treatments have you tried? OTC cold medication  Who do you live with?   Are you, or a household member, a healthcare worker or a ? No  Do you live in a nursing home, group home, or shelter? No  Do you have a way to get food/medications if quarantined? Yes, I have a friend or family member who can help me.              Review of Systems         Objective           Vitals:  No vitals were obtained today due to virtual visit.    Physical Exam   healthy, alert and no distress  PSYCH: Alert and oriented times 3; coherent speech, normal   rate and volume, able to articulate logical thoughts, able   to abstract reason, no tangential thoughts, no hallucinations   or delusions  Her affect is normal  RESP: No cough, no audible wheezing, able to talk in full sentences  Remainder of exam unable to be completed due to telephone visits            Phone call duration: 10  minutes

## 2021-11-18 NOTE — PATIENT INSTRUCTIONS
"Patient Education       Coronavirus Disease 2019 (COVID-19): Caring for Yourself or Others  If you or a household member have symptoms of COVID-19, follow these guidelines for preventing spread of the virus and managing symptoms. This is regardless of your vaccination status.  If you have COVID-19 symptoms    Stay home. Call your healthcare provider and tell them you have symptoms of COVID-19. Do this before going to any hospital or clinic. Follow your provider's instructions. You may be advised to isolate yourself at home. This is called self-isolation. You may also be told to stay at least 6 feet from others to prevent the spread of COVID-19. This is called \"social distancing.\"    Stay away from work, school, and public places. Limit physical contact with family members. Limit visitors. Don't kiss anyone or share eating or drinking utensils. Clean surfaces you touch with disinfectant. This is to help prevent the virus from spreading.    If you need to cough or sneeze, do it into a tissue. Then throw the tissue into the trash. If you don't have tissues, cough or sneeze into the bend of your elbow.    Wear a cloth face mask with two or more layers of washable, breathable fabric and a nose wire while in public or when indoors with people who don't live with you. Or you can wear a disposable paper mask with a cloth mask on top. Wear the mask so that it covers both your nose and mouth.    Don t share food or personal items with people in your household. This includes items like eating and drinking utensils, towels, and bedding.    If you need to go to a hospital or clinic, expect that the healthcare staff will wear protective equipment such as masks, gowns, gloves, and eye protection. You may be advised to wait in or enter through a separate area. This is to prevent the possible virus from spreading.    Tell the healthcare staff about recent travel. This includes local travel on public transport. Staff may need to find " other people you have been in contact with.    Follow all instructions the healthcare staff give you.    If you have been diagnosed with COVID-19    Stay home and start self-isolation. Don t leave your home unless you need to get medical care. Don't go to work, school, or public areas. Don't use public transportation or taxis.    Follow all instructions from your healthcare provider. Call your healthcare provider s office before going. They can prepare and give you instructions. This will help prevent the virus from spreading.    If you need to go to a hospital or clinic, expect that the healthcare staff will wear protective equipment such as masks, gowns, gloves, and eye protection. You may be advised to wait in or enter through a separate area. This is to prevent the possible virus from spreading.    Wear a face mask with 2 or more layers and a nose wire. Use either a cloth mask with layers of tightly woven, breathable fabric or a disposable paper mask with a cloth mask on top. This is to protect other people from your germs. If you are not able to wear a mask, your caregivers should. Wear the mask so that it covers both your nose and mouth.    Stay away from other people in your home.    Stay away from pets and other animals.    Don t share food or personal items with people in your household. This includes items like eating and drinking utensils, towels, and bedding.    If you need to cough or sneeze, do it into a tissue. Then throw the tissue into the trash. If you don't have tissues, cough or sneeze into the bend of your elbow.    Wash your hands often.    Self-care at home   Prevention  The FDA has approved several vaccines to prevent COVID-19 or reduce its severity. These vaccines also reduce how severe the illness will be if you get the virus. No vaccine is ever 100% effective in preventing any illness, but the COVID-19 vaccines work well and are safe. One vaccine has been approved for people as young as  12. Expert groups, including ACOG and CDC, advise pregnant or breastfeeding people to be vaccinated. Talk with your healthcare provider about which vaccine is best for you and your family.  Treatment  Current treatment is mainly aimed at helping your body while it fights the virus. This is known as supportive care. For serious COVID-19, you may need to stay in the hospital. Supportive care includes:    Getting rest. This helps your body fight the illness.    Staying hydrated.  Drinking liquids is the best way to prevent dehydration. Try to drink 6 to 8 glasses of liquids every day, or as advised by your provider. Also check with your provider about which fluids are best for you. Don't drink fluids that contain caffeine or alcohol.    Taking over-the-counter (OTC) pain medicine. These are used to help ease pain and reduce fever. Follow your healthcare provider's instructions for which OTC medicine to use.  If you've been treated for suspected or confirmed COVID-19 , follow all of your healthcare team's instructions. This will include when it's OK to stop self-isolation. You may also get instructions on position changes to help your breathing, such as lying on your belly (prone positioning). If you were treated at a hospital and discharged, you may be sent home with a pulse oximeter. This is a small electronic device that you clip on your fingertip. It measures the amount of oxygen in your body. Follow your healthcare team's instructions on its use, how they will be in touch with you, and when to call them.  The FDA approved monoclonal antibody therapy for emergency investigational use in certain people who have a positive COVID-19 viral test and have mild to moderate symptoms but are not in the hospital. Your healthcare provider will advise you on whether monoclonal antibody therapy is appropriate for you. It's not approved to prevent COVID-19. It's approved for people 12 years and older who weigh about 88 pounds (40  kgs) and are at high risk for severe COVID-19 and a hospital stay. This includes people who are 65 years and older and people with certain chronic conditions. Monoclonal antibody therapy is not approved for people who:    Are in the hospital with COVID-19, or    Need oxygen therapy for COVID-19, or    Need oxygen therapy for a chronic condition and need to have oxygen flow increased because of COVID-19     If you've had confirmed COVID-19, your healthcare team may ask you to consider donating your plasma. This is called COVID-19 convalescent plasma donation. Plasma from people fully recovered from COVID-19 may contain antibodies to help fight COVID-19 in people who are currently seriously ill with the disease. Experts don't know the safety of COVID-19 convalescent plasma or how well it works. Research continues. The FDA has approved it for emergency use in certain people with serious or life-threatening COVID-19.  Home care for a sick person     Follow all instructions from healthcare staff.    Wash your hands often.    Wear protective clothing as advised.    Make sure the sick person wears a mask. If they can't wear a mask, don't stay in the same room with the person. If you must be in the same room, wear a face mask. When wearing a mask, make sure that it covers both the nose and mouth.    Keep track of the sick person s symptoms.    Clean home surfaces often with disinfectant. This includes phones, kitchen counters, fridge door handle, bathroom surfaces, and others.    Don t let anyone share household items with the sick person. This includes eating and drinking tools, towels, sheets, or blankets.    Clean fabrics and laundry thoroughly.    Keep other people and pets away from the sick person.    When you can stop self-isolation  When you are sick with COVID-19, you should stay away from other people. This is called self-isolation.  For normally healthy children or adults with COVID-19 symptoms, CDC advises  stopping self-isolation when all 3 of these are true:  1. You have had no fever for at least 24 hours. This means no fever without medicine that reduces fever, such as acetaminophen, for at least 24 hours.  2. Your symptoms such as cough or trouble breathing have improved.  3. It has been at least 10 days since your first symptoms started.  For people who have COVID-19 but no symptoms , isolation can stop 10 days after the first positive test.  If you have a weak immune system and COVID-19, or if you've had severe COVID-19,  your instructions on when to stop isolation will be somewhat different. Some conditions and treatments can cause a weak immune system. These include cancer treatment, bone marrow or organ transplants, and conditions such as HIV or other immune system disorders. You may be advised to self-isolate up to 20 days after your symptoms first started. Your healthcare provider may want to retest you for COVID-19. Follow your provider's instructions.  CDC mask guidance  Consider the CDC's guidance and your local community's instructions on face masks.       Cloth masks may help prevent people who have COVID-19 from spreading the virus to others.    Cloth masks are most likely to reduce COVID-19 spread when masks are widely used by people who are out in the public.    Wear a mask inside your house if you live with someone who has symptoms of COVID-19 or has tested positive for COVID-19.    CDC advises all people older than 2 who are not fully vaccinated to wear a mask and stay 6 feet away from others while in public.    CDC advises people with weak immune systems, even if fully vaccinated, to continue wearing masks and to stay 6 feet away from others while in public.    CDC advises indoor masking for all teachers, staff, students, and visitors to schools, regardless of vaccination status.    Like other viruses, the virus that causes COVID-19 changes (mutates) all the time. This leads to variants that are  easily spread, including the delta variant. To protect against variants, CDC advises all people over age 2, including those who are fully vaccinated, to wear a mask indoors in public settings if you are in an area of high numbers of COVID-19 cases. See the [facility]'s county data website for current transmission information in your area.     Certain people should not wear a face mask. This includes:    Children younger than 2 years old    Anyone with a health, developmental, or mental health condition that can be made worse by wearing a mask    Anyone who is unconscious or unable to remove the face covering without help     See the CDC's mask guidance.  When to call your healthcare provider  Call your healthcare provider right away if a sick person has any of these:    Trouble breathing    Pain or pressure in chest  If a sick person has any of these, call 911:    Trouble breathing that gets worse    Pain or pressure in chest that gets worse    Blue tint to lips or face    Fast or irregular heartbeat    Confusion or trouble waking    Fainting or loss of consciousness    Coughing up blood  Going home from the hospital  If you were diagnosed with COVID-19 and were recently discharged from the hospital:    Follow the instructions above for self-care and isolation.    Follow the hospital healthcare team s specific instructions.    Ask questions if anything is unclear to you. Write down answers so you remember them.  Date last modified: 9/24/2021  [place] last reviewed this educational content on 4/1/2020 2000-2021 [employer]. All rights reserved. This information is not intended as a substitute for professional medical care. Always follow your healthcare professional's instructions.

## 2021-11-19 LAB
SARS-COV-2 RNA RESP QL NAA+PROBE: NORMAL
SARS-COV-2 RNA RESP QL NAA+PROBE: NOT DETECTED

## 2021-12-02 ENCOUNTER — PATIENT OUTREACH (OUTPATIENT)
Dept: FAMILY MEDICINE | Facility: CLINIC | Age: 61
End: 2021-12-02
Payer: COMMERCIAL

## 2021-12-02 DIAGNOSIS — R87.810 CERVICAL HIGH RISK HPV (HUMAN PAPILLOMAVIRUS) TEST POSITIVE: ICD-10-CM

## 2021-12-14 ENCOUNTER — OFFICE VISIT (OUTPATIENT)
Dept: FAMILY MEDICINE | Facility: CLINIC | Age: 61
End: 2021-12-14
Payer: COMMERCIAL

## 2021-12-14 VITALS
HEIGHT: 62 IN | SYSTOLIC BLOOD PRESSURE: 116 MMHG | DIASTOLIC BLOOD PRESSURE: 62 MMHG | WEIGHT: 130 LBS | HEART RATE: 80 BPM | BODY MASS INDEX: 23.92 KG/M2

## 2021-12-14 DIAGNOSIS — Z00.00 ROUTINE GENERAL MEDICAL EXAMINATION AT A HEALTH CARE FACILITY: Primary | ICD-10-CM

## 2021-12-14 DIAGNOSIS — B00.9 HERPES SIMPLEX VIRUS INFECTION: ICD-10-CM

## 2021-12-14 DIAGNOSIS — J44.9 CHRONIC OBSTRUCTIVE PULMONARY DISEASE, UNSPECIFIED COPD TYPE (H): ICD-10-CM

## 2021-12-14 DIAGNOSIS — J01.90 ACUTE SINUSITIS WITH SYMPTOMS > 10 DAYS: ICD-10-CM

## 2021-12-14 DIAGNOSIS — R76.8 ELEVATED ANTINUCLEAR ANTIBODY (ANA) LEVEL: ICD-10-CM

## 2021-12-14 PROCEDURE — G0145 SCR C/V CYTO,THINLAYER,RESCR: HCPCS | Performed by: NURSE PRACTITIONER

## 2021-12-14 PROCEDURE — 99396 PREV VISIT EST AGE 40-64: CPT | Performed by: NURSE PRACTITIONER

## 2021-12-14 PROCEDURE — 99214 OFFICE O/P EST MOD 30 MIN: CPT | Mod: 25 | Performed by: NURSE PRACTITIONER

## 2021-12-14 PROCEDURE — 87624 HPV HI-RISK TYP POOLED RSLT: CPT | Performed by: NURSE PRACTITIONER

## 2021-12-14 RX ORDER — VALACYCLOVIR HYDROCHLORIDE 500 MG/1
500 TABLET, FILM COATED ORAL 2 TIMES DAILY
Qty: 6 TABLET | Refills: 3 | Status: SHIPPED | OUTPATIENT
Start: 2021-12-14 | End: 2022-01-12

## 2021-12-14 RX ORDER — ALBUTEROL SULFATE 90 UG/1
2 AEROSOL, METERED RESPIRATORY (INHALATION) EVERY 6 HOURS PRN
Qty: 54 G | Refills: 1 | Status: SHIPPED | OUTPATIENT
Start: 2021-12-14 | End: 2021-12-21

## 2021-12-14 ASSESSMENT — MIFFLIN-ST. JEOR: SCORE: 1117.93

## 2021-12-14 NOTE — PROGRESS NOTES
SUBJECTIVE:   CC: Astrid Santoro is an 61 year old woman who presents for preventive health visit.       Patient has been advised of split billing requirements and indicates understanding: Yes  Healthy Habits:     Getting at least 3 servings of Calcium per day:  Yes    Bi-annual eye exam:  NO    Dental care twice a year:  NO    Sleep apnea or symptoms of sleep apnea:  None    Diet:  Low salt and Carbohydrate counting    Frequency of exercise:  4-5 days/week    Duration of exercise:  Less than 15 minutes    Taking medications regularly:  Yes    Barriers to taking medications:  Not applicable    Medication side effects:  Not applicable    PHQ-2 Total Score: 0    Additional concerns today:  Yes    SINUS INFECTION   3 weeks   Had negative covid test     Vaginal sores for the past few years  Same partner x 13 yrs     Had joint pain severe at times   Has not seen rheumatology .. needs new referral. Elevated EUNICE          Social History     Tobacco Use     Smoking status: Current Every Day Smoker     Packs/day: 0.50     Years: 36.00     Pack years: 18.00     Types: Cigarettes     Smokeless tobacco: Never Used     Tobacco comment: cutting down,    Substance Use Topics     Alcohol use: Yes     Comment: 1-2 drinks per day ( 2 bottles)        Today's PHQ-2 Score:   PHQ-2 ( 1999 Pfizer) 12/14/2021   Q1: Little interest or pleasure in doing things 0   Q2: Feeling down, depressed or hopeless 0   PHQ-2 Score 0   PHQ-2 Total Score (12-17 Years)- Positive if 3 or more points; Administer PHQ-A if positive -   Q1: Little interest or pleasure in doing things -   Q2: Feeling down, depressed or hopeless -   PHQ-2 Score Incomplete       Abuse: Current or Past (Physical, Sexual or Emotional) - No  Do you feel safe in your environment? Yes        Social History     Tobacco Use     Smoking status: Current Every Day Smoker     Packs/day: 0.50     Years: 36.00     Pack years: 18.00     Types: Cigarettes     Smokeless tobacco: Never Used      Tobacco comment: cutting down,    Substance Use Topics     Alcohol use: Yes     Comment: 1-2 drinks per day ( 2 bottles)     If you drink alcohol do you typically have >3 drinks per day or >7 drinks per week? No    Alcohol Use 12/15/2020   Prescreen: >3 drinks/day or >7 drinks/week? Yes   Prescreen: >3 drinks/day or >7 drinks/week? -   AUDIT SCORE  10     AUDIT - Alcohol Use Disorders Identification Test - Reproduced from the World Health Organization Audit 2001 (Second Edition) 12/15/2020   1.  How often do you have a drink containing alcohol? 4 or more times a week   2.  How many drinks containing alcohol do you have on a typical day when you are drinking? 5 or 6   3.  How often do you have five or more drinks on one occasion? Daily or almost daily   4.  How often during the last year have you found that you were not able to stop drinking once you had started? Never   5.  How often during the last year have you failed to do what was normally expected of you because of drinking? Never   6.  How often during the last year have you needed a first drink in the morning to get yourself going after a heavy drinking session? Never   7.  How often during the last year have you had a feeling of guilt or remorse after drinking? Never   8.  How often during the last year have you been unable to remember what happened the night before because of your drinking? Never   9.  Have you or someone else been injured because of your drinking? No   10. Has a relative, friend, doctor or other health care worker been concerned about your drinking or suggested you cut down? No   TOTAL SCORE 10       Reviewed orders with patient.  Reviewed health maintenance and updated orders accordingly - Yes  Labs reviewed in EPIC  BP Readings from Last 3 Encounters:   12/14/21 116/62   05/19/21 104/70   03/19/21 105/68    Wt Readings from Last 3 Encounters:   12/14/21 59 kg (130 lb)   05/19/21 60.1 kg (132 lb 9.6 oz)   03/19/21 61.2 kg (135 lb)                   Patient Active Problem List   Diagnosis     HPV in female     Tobacco use disorder     Perimenopause     CARDIOVASCULAR SCREENING; LDL GOAL LESS THAN 160     COPD (chronic obstructive pulmonary disease) (H)     Cervical high risk HPV (human papillomavirus) test positive     DDD (degenerative disc disease), cervical     DDD (degenerative disc disease), thoracic     Pulmonary nodules     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     Benign neoplasm of soft tissue of torso     Past Surgical History:   Procedure Laterality Date     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, 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     RESECT TUMOR UPPER EXTREMITY Left 3/19/2021    Procedure: removal tumor left forearm;  Surgeon: John Rahman MD;  Location: [facility] OR     SURGICAL HISTORY OF -   01/30/98, 09/03/97    Loop Electrosurgical Excision Procedure (LEEP)      TUBAL LIGATION  1989       Social History     Tobacco Use     Smoking status: Current Every Day Smoker     Packs/day: 0.50     Years: 36.00     Pack years: 18.00     Types: Cigarettes     Smokeless tobacco: Never Used     Tobacco comment: cutting down,    Substance Use Topics     Alcohol use: Yes     Comment: 1-2 drinks per day ( 2 bottles)     Family History   Problem Relation Age of Onset     Heart Disease Father      Hypertension Father      Alcohol/Drug Father      Diabetes Maternal Grandmother      Family History Negative Mother          Current Outpatient Medications   Medication Sig Dispense Refill     albuterol (VENTOLIN HFA) 108 (90 Base) MCG/ACT inhaler Inhale 2 puffs into the lungs every 6 hours as needed 54 g 1     amoxicillin-clavulanate (AUGMENTIN) 875-125 MG tablet Take 1 tablet by mouth 2 times daily 20 tablet 0     fluticasone-vilanterol (BREO ELLIPTA) 200-25 MCG/INH inhaler Inhale 1 puff into the lungs daily 60 each 2     ibuprofen (ADVIL/MOTRIN) 200 MG capsule Take 400 mg by mouth 2 times daily       multivitamin w/minerals (MULTI-VITAMIN) tablet Take 1  tablet by mouth every morning       omega 3 1000 MG CAPS Take by mouth every morning       senna-docusate (SENOKOT-S/PERICOLACE) 8.6-50 MG tablet Take 1-2 tablets by mouth 2 times daily 30 tablet 0     triamcinolone (KENALOG) 0.5 % external ointment Apply 1 g topically 2 times daily 15 g 1     TURMERIC PO Take by mouth every morning       TYLENOL EXTRA STRENGTH OR Take by mouth 2 times daily        valACYclovir (VALTREX) 500 MG tablet Take 1 tablet (500 mg) by mouth 2 times daily for 3 days 6 tablet 3     Allergies   Allergen Reactions     Shrimp Nausea and Vomiting     Metronidazole Rash       Breast Cancer Screening:  Any new diagnosis of family breast, ovarian, or bowel cancer? No    FHS-7: No flowsheet data found.  click delete button to remove this line now  Mammogram Screening: Recommended mammography every 1-2 years with patient discussion and risk factor consideration  Pertinent mammograms are reviewed under the imaging tab.    History of abnormal Pap smear: NO - age 30-65 PAP every 5 years with negative HPV co-testing recommended  PAP / HPV Latest Ref Rng & Units 12/15/2020 9/27/2017 8/21/2009   PAP (Historical) - NIL ASC-US(A) NIL   HPV16 NEG:Negative Negative Negative -   HPV18 NEG:Negative Negative Negative -   HRHPV NEG:Negative Positive(A) Positive(A) -     Reviewed and updated as needed this visit by clinical staff                Reviewed and updated as needed this visit by Provider               Past Medical History:   Diagnosis Date     Abnormal Pap smear of cervix     2017     Cervical high risk HPV (human papillomavirus) test positive 10/04/2017    12/15/20     COPD (chronic obstructive pulmonary disease) (H)      Forearm mass      Human papillomavirus in conditions classified elsewhere and of unspecified site      Lupus erythematosus      Other, mixed, or unspecified nondependent drug abuse, unspecified     Meth, Marijuana. Currently smoking marijuana.     Positive EUNICE (antinuclear antibody)       Pulmonary nodules      Raynaud's syndrome      Seasonal allergic rhinitis       Past Surgical History:   Procedure Laterality Date     amputation of fingers  ,     Reynaud related     RESECT TUMOR UPPER EXTREMITY Left 3/19/2021    Procedure: removal tumor left forearm;  Surgeon: John Rahman MD;  Location: [facility] OR     SURGICAL HISTORY OF -   98, 97    Loop Electrosurgical Excision Procedure (LEEP)      TUBAL LIGATION       OB History    Para Term  AB Living   4 4 4 0 0 4   SAB IAB Ectopic Multiple Live Births   0 0 0 0 0      # Outcome Date GA Lbr Jimi/2nd Weight Sex Delivery Anes PTL Lv   4 Term            3 Term            2 Term            1 Term                Review of Systems   ROS: 10 point ROS neg other than the symptoms noted above in the HPI.        OBJECTIVE:   LMP 2013   Physical Exam  GENERAL APPEARANCE: healthy, alert and no distress  EYES: Eyes grossly normal to inspection, PERRL and conjunctivae and sclerae normal  HENT: ear canals and TM's normal, nose discharge yellow-green.  Pain with palpation over the left maxillary sinus.  And mouth without ulcers or lesions, oropharynx mild erythema posterior pharynx with yellow PND and oral mucous membranes moist  NECK: no adenopathy, no asymmetry, masses, or scars and thyroid normal to palpation  RESP: lungs distant breath sounds   BREAST: normal without masses, tenderness or nipple discharge and no palpable axillary masses or adenopathy  CV: regular rate and rhythm, , no peripheral edema and peripheral pulses strong  ABDOMEN: soft, nontender, no hepatosplenomegaly, no masses and bowel sounds normal   (female): ulcerated lesion x 3 left labia with erythema and swelling. Cervix normal vaginal vault with atrophy. Uterus and ovaries unremarkable.   MS: no musculoskeletal defects are noted and gait is age appropriate without ataxia  SKIN: no suspicious lesions or rashes  NEURO: Normal strength  "and tone, sensory exam grossly normal, mentation intact and speech normal  PSYCH: mentation appears normal and affect normal/bright    Diagnostic Test Results:  Labs reviewed in Epic    ASSESSMENT/PLAN:   Astrid was seen today for physical.    Diagnoses and all orders for this visit:    Routine general medical examination at a health care facility  -     PAP screen with HPV - recommended age 30 - 65 years  -     MA Screen Bilateral w/Telly; Future  : Schedule mammogram    Chronic obstructive pulmonary disease, unspecified COPD type (H)  Smoking cessation encouraged.  Spiriva discontinued per insurance.  Trial of Breo  Continue albuterol  -     albuterol (VENTOLIN HFA) 108 (90 Base) MCG/ACT inhaler; Inhale 2 puffs into the lungs every 6 hours as needed  -     fluticasone-vilanterol (BREO ELLIPTA) 200-25 MCG/INH inhaler; Inhale 1 puff into the lungs daily    Acute sinusitis with symptoms > 10 days  Treat for bacterial infection symptoms greater than 3 weeks.  Begin  -     amoxicillin-clavulanate (AUGMENTIN) 875-125 MG tablet; Take 1 tablet by mouth 2 times daily    Elevated antinuclear antibody (EUNICE) level  Formal referral placed  -     Rheumatology Referral; Future    Herpes simplex virus infection  Symptomatic care strategies reviewed.  Begin new medication  -     valACYclovir (VALTREX) 500 MG tablet; Take 1 tablet (500 mg) by mouth 2 times daily for 3 days        Patient has been advised of split billing requirements and indicates understanding: Yes  COUNSELING:  Reviewed preventive health counseling, as reflected in patient instructions    Estimated body mass index is 23.94 kg/m  as calculated from the following:    Height as of 5/19/21: 1.585 m (5' 2.4\").    Weight as of 5/19/21: 60.1 kg (132 lb 9.6 oz).        She reports that she has been smoking cigarettes. She has a 18.00 pack-year smoking history. She has never used smokeless tobacco.  Tobacco Cessation Action Plan:   Information offered: Patient not " interested at this time      Call or return to the clinic with any worsening of symptoms or no resolution. Patient/Parent verbalized understanding and is in agreement. Medication side effects reviewed.   Current Outpatient Medications   Medication Sig Dispense Refill     albuterol (VENTOLIN HFA) 108 (90 Base) MCG/ACT inhaler Inhale 2 puffs into the lungs every 6 hours as needed 54 g 1     amoxicillin-clavulanate (AUGMENTIN) 875-125 MG tablet Take 1 tablet by mouth 2 times daily 20 tablet 0     fluticasone-vilanterol (BREO ELLIPTA) 200-25 MCG/INH inhaler Inhale 1 puff into the lungs daily 60 each 2     ibuprofen (ADVIL/MOTRIN) 200 MG capsule Take 400 mg by mouth 2 times daily       multivitamin w/minerals (MULTI-VITAMIN) tablet Take 1 tablet by mouth every morning       omega 3 1000 MG CAPS Take by mouth every morning       senna-docusate (SENOKOT-S/PERICOLACE) 8.6-50 MG tablet Take 1-2 tablets by mouth 2 times daily 30 tablet 0     triamcinolone (KENALOG) 0.5 % external ointment Apply 1 g topically 2 times daily 15 g 1     TURMERIC PO Take by mouth every morning       TYLENOL EXTRA STRENGTH OR Take by mouth 2 times daily        valACYclovir (VALTREX) 500 MG tablet Take 1 tablet (500 mg) by mouth 2 times daily for 3 days 6 tablet 3     Chart documentation with Dragon Voice recognition Software. Although reviewed after completion, some words and grammatical errors may remain.      MARISSA Correa [facility]

## 2021-12-16 ENCOUNTER — TELEPHONE (OUTPATIENT)
Dept: FAMILY MEDICINE | Facility: CLINIC | Age: 61
End: 2021-12-16
Payer: COMMERCIAL

## 2021-12-16 DIAGNOSIS — R76.8 ELEVATED ANTINUCLEAR ANTIBODY (ANA) LEVEL: Primary | ICD-10-CM

## 2021-12-16 DIAGNOSIS — M25.50 MULTIPLE JOINT PAIN: ICD-10-CM

## 2021-12-16 LAB
BKR LAB AP GYN ADEQUACY: NORMAL
BKR LAB AP GYN INTERPRETATION: NORMAL
BKR LAB AP HPV REFLEX: NORMAL
BKR LAB AP PREVIOUS ABNORMAL: NORMAL
PATH REPORT.COMMENTS IMP SPEC: NORMAL
PATH REPORT.COMMENTS IMP SPEC: NORMAL
PATH REPORT.RELEVANT HX SPEC: NORMAL

## 2021-12-16 NOTE — TELEPHONE ENCOUNTER
Patient would like to schedule at the [facility] Call Center    Phone Message    May a detailed message be left on voicemail: yes     Reason for Call: Appointment Intake    Referring Provider Name: Dr. Darby  Diagnosis and/or Symptoms: Lupus  Request Summary [[id number]]   Procedure: Rheumatology Referral Status: Needs Scheduling   Requested appt date: 12/14/2021 (Approximate) Authorizing: Nohelia Darby APRN CNP in [facility]   Referral: [id number] (Referral NOT Required)       Expires: 12/14/2022 Priority: Routine: Next available opening   Diagnosis: Elevated antinuclear antibody (EUNICE) level [R76.8]         Action Taken: Other: [facility]    Travel Screening: Not Applicable

## 2021-12-20 ENCOUNTER — PATIENT OUTREACH (OUTPATIENT)
Dept: FAMILY MEDICINE | Facility: CLINIC | Age: 61
End: 2021-12-20
Payer: COMMERCIAL

## 2021-12-20 LAB
HUMAN PAPILLOMA VIRUS 16 DNA: NEGATIVE
HUMAN PAPILLOMA VIRUS 18 DNA: NEGATIVE
HUMAN PAPILLOMA VIRUS FINAL DIAGNOSIS: ABNORMAL
HUMAN PAPILLOMA VIRUS OTHER HR: POSITIVE

## 2021-12-21 DIAGNOSIS — J44.9 CHRONIC OBSTRUCTIVE PULMONARY DISEASE, UNSPECIFIED COPD TYPE (H): ICD-10-CM

## 2021-12-21 RX ORDER — ALBUTEROL SULFATE 90 UG/1
AEROSOL, METERED RESPIRATORY (INHALATION)
Qty: 54 G | Refills: 4 | Status: SHIPPED | OUTPATIENT
Start: 2021-12-21 | End: 2022-12-26

## 2021-12-22 NOTE — TELEPHONE ENCOUNTER
Spoke to clinic coordinator re: referral - will call back once she has a response from referring provider about providing current EUNICE results.

## 2021-12-22 NOTE — TELEPHONE ENCOUNTER
I had a call from Stephanie who is a  for [facility]. They got a Rheumatology referral from Nohelia Wilner with the reason of elevated EUNICE.  They are not able to see a recent EUNICE result. They need to send this referral to be reviewed for appointment and would need a recent EUNICE result.  She is wondering if Nohelia Darby has a more recent result or if she would want to order it.  We can call Stephanie back to let her know. [phone]. Any  will be able to help.

## 2021-12-22 NOTE — TELEPHONE ENCOUNTER
I am sorry but I may have written down the wrong number to reach your .  Please see note from Nohelia Darby. CNP.    Please let Nohelia know if you need further labs.   (I will be out of the office until 12/26. Or you can route to our care team P [phone])  Thank you.

## 2021-12-22 NOTE — TELEPHONE ENCOUNTER
EUNICE interpretation NEG^Negative Positive Abnormal     Comment:                                    Reference range:   <1:40  NEGATIVE   1:40 - 1:80  BORDERLINE POSITIVE   >1:80 POSITIVE     EUNICE pattern 1  NUCLEOLAR     EUNICE titer 1  >1,280     EUNICE pattern 2  SPECKLED     EUNICE titer 2  1:1,280    Resulting Agency  [facility]              Specimen Collected: 09/27/17  4:06 PM Last Resulted: 09/29/17  2:19 PM           Here is the result for the EUNICE.   We can do another one if they require an update to schedule the consultation.   I can place the order. Please find out if they need any other labs done before they will schedule so we can do them all at the same time. If they need the lab ordered let me know .. I will pend it now and let the patient now lab is needed.   Thanks Nohelia Darby [facility]

## 2021-12-24 NOTE — TELEPHONE ENCOUNTER
Please let patient know EUNICE  lab has been ordered. For her to come in for lab appt to have this done.  Thanks Nohelia Darby [facility]

## 2021-12-27 ENCOUNTER — E-VISIT (OUTPATIENT)
Dept: FAMILY MEDICINE | Facility: CLINIC | Age: 61
End: 2021-12-27
Payer: COMMERCIAL

## 2021-12-27 DIAGNOSIS — B37.31 CANDIDAL VULVOVAGINITIS: Primary | ICD-10-CM

## 2021-12-27 PROCEDURE — 99421 OL DIG E/M SVC 5-10 MIN: CPT | Performed by: FAMILY MEDICINE

## 2021-12-27 RX ORDER — FLUCONAZOLE 150 MG/1
150 TABLET ORAL ONCE
Qty: 1 TABLET | Refills: 0 | Status: SHIPPED | OUTPATIENT
Start: 2021-12-27 | End: 2021-12-27

## 2021-12-27 NOTE — PATIENT INSTRUCTIONS
Thank you for choosing us for your care. I have placed an order for a prescription so that you can start treatment. View your full visit summary for details by clicking on the link below. Your pharmacist will able to address any questions you may have about the medication.     If you re not feeling better within 2-3 days, please schedule an appointment.  You can schedule an appointment right here in [facility], or call [phone]  If the visit is for the same symptoms as your eVisit, we ll refund the cost of your eVisit if seen within seven days.      Yeast Infection (Candida Vaginal Infection)    You have a Candida vaginal infection. This is also known as a yeast infection. It's most often caused by a type of yeast (fungus) called Candida. Candida are normally found in the vagina. But if they increase in number, this can lead to infection and cause symptoms.   Symptoms of a yeast infection can include:     Clumpy or thin, white discharge, which may look like cottage cheese    Itching or burning    Burning with urination  Certain factors can make a yeast infection more likely. These can include:     Taking certain medicines, such as antibiotics or birth control pills    Pregnancy    Diabetes    Weak immune system  A yeast infection is most often treated with antifungal medicine. This may be given as a vaginal cream or pills you take by mouth. Treatment may last for about 1 to 7 days. Women with severe or recurrent infections may need longer courses of treatment.   Home care    If you re prescribed medicine, be sure to use it as directed. Finish all of the medicine, even if your symptoms go away. Don t try to treat yourself using over-the-counter products without talking with your provider first. They will let you know if this is a good option for you.    Ask your provider what steps you can take to help reduce your risk of having a yeast infection in the future.    Follow-up care  Follow up with your healthcare  provider, or as directed.   When to seek medical advice  Call your healthcare provider right away if:     You have a fever of 100.4 F (38 C) or higher, or as directed by your provider.    Your symptoms worsen, or they don t go away within a few days of starting treatment.    You have new pain in the lower belly or pelvic region.    You have side effects that bother you or a reaction to the cream or pills you re prescribed.    You or any partners you have sex with have new symptoms, such as a rash, joint pain, or sores.  [employer] last reviewed this educational content on 7/1/2020 2000-2021 [employer]. All rights reserved. This information is not intended as a substitute for professional medical care. Always follow your healthcare professional's instructions.

## 2021-12-31 ENCOUNTER — LAB (OUTPATIENT)
Dept: LAB | Facility: CLINIC | Age: 61
End: 2021-12-31
Payer: COMMERCIAL

## 2021-12-31 DIAGNOSIS — M25.50 MULTIPLE JOINT PAIN: ICD-10-CM

## 2021-12-31 DIAGNOSIS — R76.8 ELEVATED ANTINUCLEAR ANTIBODY (ANA) LEVEL: ICD-10-CM

## 2021-12-31 LAB
CRP SERPL-MCNC: <2.9 MG/L (ref 0–8)
ERYTHROCYTE [SEDIMENTATION RATE] IN BLOOD BY WESTERGREN METHOD: 9 MM/HR (ref 0–30)

## 2021-12-31 PROCEDURE — 86039 ANTINUCLEAR ANTIBODIES (ANA): CPT

## 2021-12-31 PROCEDURE — 36415 COLL VENOUS BLD VENIPUNCTURE: CPT

## 2021-12-31 PROCEDURE — 86038 ANTINUCLEAR ANTIBODIES: CPT

## 2021-12-31 PROCEDURE — 85652 RBC SED RATE AUTOMATED: CPT

## 2021-12-31 PROCEDURE — 86140 C-REACTIVE PROTEIN: CPT

## 2022-01-07 NOTE — TELEPHONE ENCOUNTER
Pt has EUNICE 1:1280, Nucleolar and Speckled, +joint pain and +vaginal sores.      Ok to schedule next available.    Meka Hernandez RN  Rheumatology Clinic

## 2022-01-11 ENCOUNTER — E-VISIT (OUTPATIENT)
Dept: FAMILY MEDICINE | Facility: CLINIC | Age: 62
End: 2022-01-11
Payer: COMMERCIAL

## 2022-01-11 DIAGNOSIS — J06.9 UPPER RESPIRATORY TRACT INFECTION, UNSPECIFIED TYPE: Primary | ICD-10-CM

## 2022-01-11 DIAGNOSIS — Z20.822 SUSPECTED COVID-19 VIRUS INFECTION: ICD-10-CM

## 2022-01-11 PROCEDURE — 99421 OL DIG E/M SVC 5-10 MIN: CPT | Performed by: NURSE PRACTITIONER

## 2022-01-11 NOTE — PATIENT INSTRUCTIONS
Instructions for Patients  It is recommended that you have a test for coronavirus (COVID-19) and Influenza . These illnesses can cause fever, headache,  sinus symptoms, cough and trouble breathing. Many people get a mild case and get better on their own. Some people can get very sick.     Please follow these steps:    1. We will call to schedule your test.  2. A member of our care team will ask you some questions. Then, they will use a swab to collect samples from your nose and throat.     Our testing team will send you your test results.    How can I protect others?    Stay home and away from others (self-isolate) until:    You ve had no fever--and no medicine that reduces fever--for 1 full day (24 hours). And      Your other symptoms have resolved (gotten better). For example, your cough or breathing has improved. And     At least 10 days have passed since your symptoms started.    Stay at least 6 feet away from others. (If someone will drive you to your test, stay in the backseat, as far away from the  as you can.)     Don t go to work, school or anywhere else. When it s time for your test, go straight to the testing site. Don t make any stops on the way there or back.     Wash your hands and face often. Use soap and water.     Cover your mouth and nose with a mask, tissue or washcloth.     Don t touch anyone. No hugging, kissing or handshakes.    How can I take care of myself?    1. Get lots of rest. Drink extra fluids (unless a doctor has told you not to).     2. Take Tylenol (acetaminophen) for fever or pain. If you have liver or kidney problems, ask your family doctor if it's okay to take Tylenol.     Adults can take either:     650 mg (two 325 mg pills) every 4 to 6 hours, or     1,000 mg (two 500 mg pills) every 8 hours as needed.     Note: Don't take more than 3,000 mg in one day.   Acetaminophen is found in many medicines (both prescribed and over-the-counter medicines). Read all labels to be sure  you don't take too much.   For children, check the Tylenol bottle for the right dose. The dose is based on  the child's age or weight.    3. If you have other health problems (like cancer, heart failure, an organ transplant or severe kidney disease): Call your specialty clinic if you don't feel better in the next 2 days.    4. Know when to call 911: If your breathing is so bad that it keeps you from doing normal activities, call 911 or go to the emergency room. Tell them that you've been staying home and may have COVID-19.      Thank you for limiting contact with others, wearing a simple mask to cover your cough, practice good hand hygiene habits and accessing our virtual services where possible to limit the spread of this virus.    For more information about COVID19 and options for caring for yourself at home, please visit the CDC website at https://www.cdc.gov/coronavirus/2019-ncov/about/steps-when-sick.html  For more options for care at [facility], please visit our website at [email or web address]  Thank you for choosing us for your care. Given your symptoms, I would like you to do a CURBSIDE TESTING visit for COVID AND INFLUENZA to determine what is causing them.  I have placed the orders.  They will call you to get the testing scheduled.  We will let you know when the results are back and next steps to take.

## 2022-01-12 ENCOUNTER — E-VISIT (OUTPATIENT)
Dept: FAMILY MEDICINE | Facility: CLINIC | Age: 62
End: 2022-01-12
Payer: COMMERCIAL

## 2022-01-12 DIAGNOSIS — J01.90 ACUTE NON-RECURRENT SINUSITIS, UNSPECIFIED LOCATION: ICD-10-CM

## 2022-01-12 DIAGNOSIS — J01.90 ACUTE SINUSITIS WITH SYMPTOMS > 10 DAYS: Primary | ICD-10-CM

## 2022-01-12 PROCEDURE — 99422 OL DIG E/M SVC 11-20 MIN: CPT | Performed by: NURSE PRACTITIONER

## 2022-01-12 NOTE — LETTER
January 13, 2022      Astrid Santoro  [id number] [id number]        To Whom It May Concern:    Astrid Santoro  was seen on 1/11/2022.    Please excuse her from 1/11/2022 until 1/17/2022 due to illness.        Sincerely,        MARISSA Correa CNP

## 2022-01-13 ENCOUNTER — MYC MEDICAL ADVICE (OUTPATIENT)
Dept: SCHEDULING | Facility: CLINIC | Age: 62
End: 2022-01-13
Payer: COMMERCIAL

## 2022-01-13 NOTE — PATIENT INSTRUCTIONS
Patient Education     Acute Bacterial Rhinosinusitis (ABRS)    Acute bacterial rhinosinusitis (ABRS) is an infection of your nasal cavity and sinuses. It s caused by bacteria. Acute means that you ve had symptoms for less than 4 weeks, but possibly up to 12 weeks.  Understanding your sinuses  The nasal cavity is the large air-filled space behind your nose. The sinuses are a group of spaces formed by the bones of your face. They connect with your nasal cavity. ABRS causes the tissue lining these spaces to become inflamed. Mucus may not drain normally. This leads to facial pain and other symptoms.  What causes ABRS?  ABRS most often follows an upper respiratory infection caused by a virus. Bacteria then infect the lining of your nasal cavity and sinuses. But you can also get ABRS if you have:    Nasal allergies    Long-term nasal swelling and congestion not caused by allergies    Blockage in the nose  Symptoms of ABRS  The symptoms of ABRS may be different for each person and include:    Nasal congestion or blockage    Pain or pressure in the face    Thick, colored drainage from the nose  Other symptoms may include:    Runny nose    Fluid draining from the nose down the throat (postnasal drip)    Headache    Cough    Pain    Fever  Diagnosing ABRS  ABRS may be diagnosed if you ve had an upper respiratory infection like a cold and cough for 10 or more days without improvement or with worsening symptoms. Your healthcare provider will ask about your symptoms and your medical history. The provider will check your vital signs, including your temperature. You ll have a physical exam. The healthcare provider will check your ears, nose, and throat. You likely won t need any tests. If ABRS comes back, you may have a culture or other tests.  Treatment for ABRS  Treatment may include:    Antibiotic medicine. This is for symptoms that last for at least 10 to 14 days.    Nasal corticosteroid medicine. Drops or spray used in the  nose can lessen swelling and congestion.    Over-the-counter pain medicine. This is to lessen sinus pain and pressure.    Nasal decongestant medicine. Spray or drops may help to lessen congestion. Do not use them for more than a few days.    Salt wash (saline irrigation). This can help to loosen mucus.  Possible complications of ABRS  ABRS may come back or become long-term (chronic). In rare cases, ABRS may cause complications such as:     Inflamed tissue around the brain and spinal cord (meningitis)    Inflamed tissue around the eyes (orbital cellulitis)    Inflamed bones around the sinuses (osteitis)  These problems may need to be treated in a hospital with intravenous (IV) antibiotic medicine or surgery.  When to call the healthcare provider  Call your healthcare provider if you have any of the following:    Symptoms that don t get better, or get worse    Symptoms that don t get better after 3 to 5 days on antibiotics    Trouble seeing    Swelling around your eyes    Confusion or trouble staying awake   [employer] last reviewed this educational content on 5/1/2017 2000-2021 [employer]. All rights reserved. This information is not intended as a substitute for professional medical care. Always follow your healthcare professional's instructions.           Patient Education     Sinusitis (Antibiotic Treatment)    The sinuses are air-filled spaces within the bones of the face. They connect to the inside of the nose. Sinusitis is an inflammation of the tissue that lines the sinuses. Sinusitis can occur during a cold. It can also happen due to allergies to pollens and other particles in the air. Sinusitis can cause symptoms of sinus congestion and a feeling of fullness. A sinus infection causes fever, headache, and facial pain. There is often green or yellow fluid draining from the nose or into the back of the throat (post-nasal drip). You have been given antibiotics to treat this condition.   Home  care    Take the full course of antibiotics as instructed. Don't stop taking them, even when you feel better.    Drink plenty of water, hot tea, and other liquids as directed by the healthcare provider. This may help thin nasal mucus. It also may help your sinuses drain fluids.    Heat may help soothe painful areas of your face. Use a towel soaked in hot water. Or,  the shower and direct the warm spray onto your face. Using a vaporizer along with a menthol rub at night may also help soothe symptoms.     An expectorant with guaifenesin may help thin nasal mucus and help your sinuses drain fluids. Talk with your provider or pharmacists before taking an over-the-counter (OTC) medicine if you have any questions about it or its side effects..    You can use an OTC decongestant, unless a similar medicine was prescribed to you. Nasal sprays work the fastest. Use one that contains phenylephrine or oxymetazoline. First blow your nose gently. Then use the spray. Don't use these medicines more often than directed on the label. If you do, your symptoms may get worse. You may also take pills that contain pseudoephedrine. Don t use products that combine multiple medicines. This is because side effects may be increased. Read labels. You can also ask the pharmacist for help. (People with high blood pressure should not use decongestants. They can raise blood pressure.) Talk with your provider or pharmacist if you have any questions about the medicine..    OTC antihistamines may help if allergies contributed to your sinusitis. Talk with your provider or pharmacist if you have any questions about the medicine..    Don't use nasal rinses or irrigation during an acute sinus infection, unless your healthcare provider tells you to. Rinsing may spread the infection to other areas in your sinuses.    Use acetaminophen or ibuprofen to control pain, unless another pain medicine was prescribed to you. If you have chronic liver or  kidney disease or ever had a stomach ulcer, talk with your healthcare provider before using these medicines. Never give aspirin to anyone under age 18 who is ill with a fever. It may cause severe liver damage.    Don't smoke. This can make symptoms worse.    Follow-up care  Follow up with your healthcare provider, or as advised.   When to seek medical advice  Call your healthcare provider if any of these occur:     Facial pain or headache that gets worse    Stiff neck    Unusual drowsiness or confusion    Swelling of your forehead or eyelids    Symptoms don't go away in 10 days    Vision problems, such as blurred or double vision    Fever of 100.4 F (38 C) or higher, or as directed by your healthcare provider  Call 911  Call 911 if any of these occur:     Seizure    Trouble breathing    Feeling dizzy or faint    Fingernails, skin or lips look blue, purple , or gray  Prevention  Here are steps you can take to help prevent an infection:     Keep good hand washing habits.    Don t have close contact with people who have sore throats, colds, or other upper respiratory infections.    Don t smoke, and stay away from secondhand smoke.    Stay up to date with of your vaccines.  [employer] last reviewed this educational content on 12/1/2019 2000-2021 [employer]. All rights reserved. This information is not intended as a substitute for professional medical care. Always follow your healthcare professional's instructions.           Patient Education     Self-Care for Sinusitis  Sinusitis can often be managed with self-care. Self-care can keep sinuses moist and make you feel more comfortable. Remember to follow your doctor's instructions closely. This can make a big difference in getting your sinus problem under control.   Drink fluids  Drinking extra fluids helps thin your mucus. This lets it drain from your sinuses more easily. Have a glass of water every hour or two. A humidifier helps in much the same way.  Fluids can also offset the drying effects of certain medicines. If you use a humidifier, follow the product maker's instructions on how to use it. Clean it on a regular schedule.      Drinking plenty of water can help sinuses drain.   Use saltwater rinses  Rinses help keep your sinuses and nose moist. Mix a teaspoon of salt in 8 ounces of fresh, warm water. Use a bulb syringe to gently squirt the water into your nose a few times a day. You can also buy ready-made saline nasal sprays.   Apply hot or cold packs  Applying heat to the area surrounding your sinuses may make you feel more comfortable. Use a hot water bottle or a hand towel dipped in hot water. Some people also find ice packs effective for relieving pain.   Medicines  Your doctor may prescribe medicines to help treat your sinusitis. If you have an infection, antibiotics can help clear it up. If you are prescribed antibiotics, take all pills on schedule until they are gone, even if you feel better. Decongestants help relieve swelling. Use decongestant sprays for short periods only under the direction of your healthcare provider. If you have allergies, your doctor may prescribe medicines to help relieve them.   [employer] last reviewed this educational content on 9/1/2019 2000-2021 [employer]. All rights reserved. This information is not intended as a substitute for professional medical care. Always follow your healthcare professional's instructions.

## 2022-01-14 NOTE — TELEPHONE ENCOUNTER
Faxed letter to provided email from pt and mailed pt a copy   Yovana Avalos MA on 1/14/2022 at 8:20 AM

## 2022-01-16 ENCOUNTER — MYC MEDICAL ADVICE (OUTPATIENT)
Dept: FAMILY MEDICINE | Facility: CLINIC | Age: 62
End: 2022-01-16
Payer: COMMERCIAL

## 2022-01-16 DIAGNOSIS — B00.9 HERPES SIMPLEX VIRUS INFECTION: ICD-10-CM

## 2022-01-17 RX ORDER — VALACYCLOVIR HYDROCHLORIDE 500 MG/1
500 TABLET, FILM COATED ORAL 2 TIMES DAILY
Qty: 6 TABLET | Refills: 3 | Status: SHIPPED | OUTPATIENT
Start: 2022-01-17

## 2022-01-24 ENCOUNTER — TELEPHONE (OUTPATIENT)
Dept: FAMILY MEDICINE | Facility: CLINIC | Age: 62
End: 2022-01-24
Payer: COMMERCIAL

## 2022-01-24 NOTE — TELEPHONE ENCOUNTER
Patient Quality Outreach    Patient is due for the following:   Colon Cancer Screening -  FIT  Breast Cancer Screening - Mammogram    NEXT STEPS:   Patient was scheduled for an appointment.  Patient needs FIT testing.    Type of outreach:    Sent [employer] message.      Questions for provider review:    None     Bailee Kahler

## 2022-02-03 ENCOUNTER — HOSPITAL ENCOUNTER (OUTPATIENT)
Dept: CT IMAGING | Facility: CLINIC | Age: 62
End: 2022-02-03
Attending: NURSE PRACTITIONER
Payer: COMMERCIAL

## 2022-02-03 ENCOUNTER — HOSPITAL ENCOUNTER (OUTPATIENT)
Dept: MAMMOGRAPHY | Facility: CLINIC | Age: 62
End: 2022-02-03
Attending: NURSE PRACTITIONER
Payer: COMMERCIAL

## 2022-02-03 DIAGNOSIS — R91.8 PULMONARY NODULES: ICD-10-CM

## 2022-02-03 DIAGNOSIS — Z12.31 VISIT FOR SCREENING MAMMOGRAM: ICD-10-CM

## 2022-02-03 PROCEDURE — 77067 SCR MAMMO BI INCL CAD: CPT

## 2022-02-03 PROCEDURE — 71250 CT THORAX DX C-: CPT

## 2022-02-06 ENCOUNTER — MYC MEDICAL ADVICE (OUTPATIENT)
Dept: FAMILY MEDICINE | Facility: CLINIC | Age: 62
End: 2022-02-06
Payer: COMMERCIAL

## 2022-02-07 ENCOUNTER — MYC MEDICAL ADVICE (OUTPATIENT)
Dept: FAMILY MEDICINE | Facility: CLINIC | Age: 62
End: 2022-02-07
Payer: COMMERCIAL

## 2022-02-14 ENCOUNTER — PATIENT OUTREACH (OUTPATIENT)
Dept: FAMILY MEDICINE | Facility: CLINIC | Age: 62
End: 2022-02-14
Payer: COMMERCIAL

## 2022-02-14 DIAGNOSIS — R87.810 CERVICAL HIGH RISK HPV (HUMAN PAPILLOMAVIRUS) TEST POSITIVE: ICD-10-CM

## 2022-02-22 ENCOUNTER — VIRTUAL VISIT (OUTPATIENT)
Dept: PULMONOLOGY | Facility: CLINIC | Age: 62
End: 2022-02-22
Attending: INTERNAL MEDICINE
Payer: COMMERCIAL

## 2022-02-22 ENCOUNTER — MYC MEDICAL ADVICE (OUTPATIENT)
Dept: FAMILY MEDICINE | Facility: CLINIC | Age: 62
End: 2022-02-22

## 2022-02-22 DIAGNOSIS — R43.2 LOSS OF TASTE: ICD-10-CM

## 2022-02-22 DIAGNOSIS — R91.8 LUNG NODULES: Primary | ICD-10-CM

## 2022-02-22 DIAGNOSIS — F17.210 CIGARETTE NICOTINE DEPENDENCE, UNCOMPLICATED: ICD-10-CM

## 2022-02-22 PROCEDURE — 99214 OFFICE O/P EST MOD 30 MIN: CPT | Mod: 95 | Performed by: INTERNAL MEDICINE

## 2022-02-22 NOTE — LETTER
2/22/2022       RE: Astrid Santoro  [address] [id number]     Dear Colleague,    Thank you for referring your patient, [facility]id Santoro[facility]. Please see a copy of my visit note below.    LUNG NODULE & INTERVENTIONAL PULMONARY CLINIC  CL[facility]ICS & SURGERY CENTER, [facility]     Astrid Santoro MRN# [id number]   Age: 61 year old YOB: 1960       Requesting Physician: Referred Self, MD  No address on file       Assessment and Plan:    1.  New worsening lung nodules concerning for cancer-more concerning for metastatic versus primary lung.  Given upper lobe predominance, infection is also possibility..  They meet criteria for PET scan.  I will order this and follow-up.    2.  Loss of taste and smell.  Consistent with Covid infection but negative multiple times by her report.  If this does not improve over the next few months could consider referring for evaluation.                 History:     Astrid Santoro is a 61 year old female with sig h/o for significant tobacco use, currently smoking here for follow-up of lung nodules.  She previously had some nodules but was referred back to lung cancer screening.  She had a chest cold associated with loss of taste and smell although was Covid negative in the fall.  Earlier this year had some sinus issues that have resolved.  No hemoptysis.  No weight loss.      - My interpretation of the images relevant for this visit includes: Multiple new bilateral upper lobe nodules, greatest 14 mm, some cavitation.             Past Medical History:      Past Medical History:   Diagnosis Date     Abnormal Pap smear of cervix     2017     Cervical high risk HPV (human papillomavirus) test positive 10/04/2017    12/15/20, 12/14/21     COPD (chronic obstructive pulmonary disease) (H)      Forearm mass      Human papillomavirus in  conditions classified elsewhere and of unspecified site      Lupus erythematosus      Other, mixed, or unspecified nondependent drug abuse, unspecified     Meth, Marijuana. Currently smoking marijuana.     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     RESECT TUMOR UPPER EXTREMITY Left 3/19/2021    Procedure: removal tumor left forearm;  Surgeon: John Rahman MD;  Location: [facility] OR     SURGICAL HISTORY OF -   01/30/98, 09/03/97    Loop Electrosurgical Excision Procedure (LEEP)      TUBAL LIGATION  1989          Social History:     Social History     Tobacco Use     Smoking status: Current Every Day Smoker     Packs/day: 0.50     Years: 36.00     Pack years: 18.00     Types: Cigarettes     Smokeless tobacco: Never Used     Tobacco comment: cutting down,    Substance Use Topics     Alcohol use: Yes     Comment: 1-2 drinks per day ( 2 bottles)          Family History:     Family History   Problem Relation Age of Onset     Heart Disease Father      Hypertension Father      Alcohol/Drug Father      Diabetes Maternal Grandmother      Family History Negative Mother            Allergies:      Allergies   Allergen Reactions     Shrimp Nausea and Vomiting     Metronidazole Rash          Medications:     Current Outpatient Medications   Medication Sig     albuterol (PROAIR HFA/PROVENTIL HFA/VENTOLIN HFA) 108 (90 Base) MCG/ACT inhaler INHALE 2 PUFFS BY MOUTH EVERY 6 HOURS AS NEEDED     amoxicillin-clavulanate (AUGMENTIN) 875-125 MG tablet Take 1 tablet by mouth 2 times daily     amoxicillin-clavulanate (AUGMENTIN) 875-125 MG tablet Take 1 tablet by mouth 2 times daily     fluticasone-vilanterol (BREO ELLIPTA) 200-25 MCG/INH inhaler Inhale 1 puff into the lungs daily     ibuprofen (ADVIL/MOTRIN) 200 MG  capsule Take 400 mg by mouth 2 times daily     multivitamin w/minerals (MULTI-VITAMIN) tablet Take 1 tablet by mouth every morning     omega 3 1000 MG CAPS Take by mouth every morning     senna-docusate (SENOKOT-S/PERICOLACE) 8.6-50 MG tablet Take 1-2 tablets by mouth 2 times daily     triamcinolone (KENALOG) 0.5 % external ointment Apply 1 g topically 2 times daily     TURMERIC PO Take by mouth every morning     TYLENOL EXTRA STRENGTH OR Take by mouth 2 times daily      valACYclovir (VALTREX) 500 MG tablet Take 1 tablet (500 mg) by mouth 2 times daily     No current facility-administered medications for this visit.          Review of Systems:     See HPI         Physical Exam:   LMP 02/27/2013     Constitutional - looks well, in no apparent distress  Eyes - no redness or discharge  Respiratory -breathing appears comfortable. No wheeze or rhonchi.   Cardiac -- Normal rate, rhythm.   Skin - No appreciable discoloration or lesions (very limited exam)  Neurological - No apparent tremors. Speech fluent and articlate  Psychiatric - no signs of delirium or anxiety          Current Laboratory Data:   All laboratory and imaging data reviewed.    No results found for this or any previous visit (from the past 24 hour(s)).         Again, thank you for allowing me to participate in the care of your patient.      Sincerely,    Inocencio Rivers MD

## 2022-02-22 NOTE — PROGRESS NOTES
Astrid is a 61 year old who is being evaluated via a billable video visit.      How would you like to obtain your AVS? MyChart  If the video visit is dropped, the invitation should be resent by: Text to cell phone: [phone]  Will anyone else be joining your video visit? No      Video Start Time: 148  Video-Visit Details    Type of service:  Video Visit    Video End Time:200    Originating Location (pt. Location): Home    Distant Location (provider location):  [facility]     Platform used for Video Visit: Yousuf Henry    LUNG NODULE & INTERVENTIONAL PULMONARY CLINIC  CLINICS & SURGERY CENTER, [facility]     Astrid Santoro MRN# [id number]   Age: 61 year old YOB: 1960       Requesting Physician: Bradley Brady MD  No address on file       Assessment and Plan:    1.  New worsening lung nodules concerning for cancer-more concerning for metastatic versus primary lung.  Given upper lobe predominance, infection is also possibility..  They meet criteria for PET scan.  I will order this and follow-up.    2.  Loss of taste and smell.  Consistent with Covid infection but negative multiple times by her report.  If this does not improve over the next few months could consider referring for evaluation.                 History:     Astrid Santoro is a 61 year old female with sig h/o for significant tobacco use, currently smoking here for follow-up of lung nodules.  She previously had some nodules but was referred back to lung cancer screening.  She had a chest cold associated with loss of taste and smell although was Covid negative in the fall.  Earlier this year had some sinus issues that have resolved.  No hemoptysis.  No weight loss.      - My interpretation of the images relevant for this visit includes: Multiple new bilateral upper lobe nodules, greatest 14 mm, some cavitation.             Past Medical History:      Past  Medical History:   Diagnosis Date     Abnormal Pap smear of cervix     2017     Cervical high risk HPV (human papillomavirus) test positive 10/04/2017    12/15/20, 12/14/21     COPD (chronic obstructive pulmonary disease) (H)      Forearm mass      Human papillomavirus in conditions classified elsewhere and of unspecified site      Lupus erythematosus      Other, mixed, or unspecified nondependent drug abuse, unspecified     Meth, Marijuana. Currently smoking marijuana.     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     RESECT TUMOR UPPER EXTREMITY Left 3/19/2021    Procedure: removal tumor left forearm;  Surgeon: John Rahman MD;  Location: [facility] OR     SURGICAL HISTORY OF -   01/30/98, 09/03/97    Loop Electrosurgical Excision Procedure (LEEP)      TUBAL LIGATION  1989          Social History:     Social History     Tobacco Use     Smoking status: Current Every Day Smoker     Packs/day: 0.50     Years: 36.00     Pack years: 18.00     Types: Cigarettes     Smokeless tobacco: Never Used     Tobacco comment: cutting down,    Substance Use Topics     Alcohol use: Yes     Comment: 1-2 drinks per day ( 2 bottles)          Family History:     Family History   Problem Relation Age of Onset     Heart Disease Father      Hypertension Father      Alcohol/Drug Father      Diabetes Maternal Grandmother      Family History Negative Mother            Allergies:      Allergies   Allergen Reactions     Shrimp Nausea and Vomiting     Metronidazole Rash          Medications:     Current Outpatient Medications   Medication Sig     albuterol (PROAIR HFA/PROVENTIL HFA/VENTOLIN HFA) 108 (90 Base) MCG/ACT inhaler INHALE 2 PUFFS BY MOUTH EVERY 6 HOURS AS NEEDED     amoxicillin-clavulanate (AUGMENTIN) 875-125 MG tablet  Take 1 tablet by mouth 2 times daily     amoxicillin-clavulanate (AUGMENTIN) 875-125 MG tablet Take 1 tablet by mouth 2 times daily     fluticasone-vilanterol (BREO ELLIPTA) 200-25 MCG/INH inhaler Inhale 1 puff into the lungs daily     ibuprofen (ADVIL/MOTRIN) 200 MG capsule Take 400 mg by mouth 2 times daily     multivitamin w/minerals (MULTI-VITAMIN) tablet Take 1 tablet by mouth every morning     omega 3 1000 MG CAPS Take by mouth every morning     senna-docusate (SENOKOT-S/PERICOLACE) 8.6-50 MG tablet Take 1-2 tablets by mouth 2 times daily     triamcinolone (KENALOG) 0.5 % external ointment Apply 1 g topically 2 times daily     TURMERIC PO Take by mouth every morning     TYLENOL EXTRA STRENGTH OR Take by mouth 2 times daily      valACYclovir (VALTREX) 500 MG tablet Take 1 tablet (500 mg) by mouth 2 times daily     No current facility-administered medications for this visit.          Review of Systems:     See HPI         Physical Exam:   LMP 02/27/2013     Constitutional - looks well, in no apparent distress  Eyes - no redness or discharge  Respiratory -breathing appears comfortable. No wheeze or rhonchi.   Cardiac -- Normal rate, rhythm.   Skin - No appreciable discoloration or lesions (very limited exam)  Neurological - No apparent tremors. Speech fluent and articlate  Psychiatric - no signs of delirium or anxiety          Current Laboratory Data:   All laboratory and imaging data reviewed.    No results found for this or any previous visit (from the past 24 hour(s)).

## 2022-03-13 DIAGNOSIS — J44.9 CHRONIC OBSTRUCTIVE PULMONARY DISEASE, UNSPECIFIED COPD TYPE (H): ICD-10-CM

## 2022-03-15 ENCOUNTER — PRE VISIT (OUTPATIENT)
Dept: RHEUMATOLOGY | Facility: CLINIC | Age: 62
End: 2022-03-15
Payer: COMMERCIAL

## 2022-03-15 ENCOUNTER — OFFICE VISIT (OUTPATIENT)
Dept: RHEUMATOLOGY | Facility: CLINIC | Age: 62
End: 2022-03-15
Attending: INTERNAL MEDICINE
Payer: COMMERCIAL

## 2022-03-15 ENCOUNTER — LAB (OUTPATIENT)
Dept: LAB | Facility: CLINIC | Age: 62
End: 2022-03-15
Attending: INTERNAL MEDICINE
Payer: COMMERCIAL

## 2022-03-15 VITALS
SYSTOLIC BLOOD PRESSURE: 127 MMHG | DIASTOLIC BLOOD PRESSURE: 70 MMHG | RESPIRATION RATE: 16 BRPM | BODY MASS INDEX: 25.08 KG/M2 | OXYGEN SATURATION: 97 % | HEART RATE: 83 BPM | WEIGHT: 136.3 LBS | HEIGHT: 62 IN | TEMPERATURE: 98.1 F

## 2022-03-15 DIAGNOSIS — R76.8 ELEVATED ANTINUCLEAR ANTIBODY (ANA) LEVEL: ICD-10-CM

## 2022-03-15 DIAGNOSIS — M34.1 CREST (CALCINOSIS, RAYNAUD'S PHENOMENON, ESOPHAGEAL DYSFUNCTION, SCLERODACTYLY, TELANGIECTASIA) (H): Primary | ICD-10-CM

## 2022-03-15 DIAGNOSIS — M34.1 CREST (CALCINOSIS, RAYNAUD'S PHENOMENON, ESOPHAGEAL DYSFUNCTION, SCLERODACTYLY, TELANGIECTASIA) (H): ICD-10-CM

## 2022-03-15 LAB
ALBUMIN SERPL-MCNC: 4.4 G/DL (ref 3.4–5)
ALBUMIN UR-MCNC: NEGATIVE MG/DL
ALP SERPL-CCNC: 61 U/L (ref 40–150)
ALT SERPL W P-5'-P-CCNC: 29 U/L (ref 0–50)
ANION GAP SERPL CALCULATED.3IONS-SCNC: 5 MMOL/L (ref 3–14)
APPEARANCE UR: CLEAR
AST SERPL W P-5'-P-CCNC: 28 U/L (ref 0–45)
BASOPHILS # BLD AUTO: 0 10E3/UL (ref 0–0.2)
BASOPHILS NFR BLD AUTO: 1 %
BILIRUB SERPL-MCNC: 0.4 MG/DL (ref 0.2–1.3)
BILIRUB UR QL STRIP: NEGATIVE
BUN SERPL-MCNC: 10 MG/DL (ref 7–30)
CALCIUM SERPL-MCNC: 9.4 MG/DL (ref 8.5–10.1)
CHLORIDE BLD-SCNC: 107 MMOL/L (ref 94–109)
CK SERPL-CCNC: 143 U/L (ref 30–225)
CO2 SERPL-SCNC: 28 MMOL/L (ref 20–32)
COLOR UR AUTO: YELLOW
CREAT SERPL-MCNC: 0.62 MG/DL (ref 0.52–1.04)
CREAT UR-MCNC: 30 MG/DL
CRP SERPL-MCNC: <2.9 MG/L (ref 0–8)
EOSINOPHIL # BLD AUTO: 0.1 10E3/UL (ref 0–0.7)
EOSINOPHIL NFR BLD AUTO: 1 %
ERYTHROCYTE [DISTWIDTH] IN BLOOD BY AUTOMATED COUNT: 12.2 % (ref 10–15)
ERYTHROCYTE [SEDIMENTATION RATE] IN BLOOD BY WESTERGREN METHOD: 9 MM/HR (ref 0–30)
GFR SERPL CREATININE-BSD FRML MDRD: >90 ML/MIN/1.73M2
GLUCOSE BLD-MCNC: 106 MG/DL (ref 70–99)
GLUCOSE UR STRIP-MCNC: NEGATIVE MG/DL
HCT VFR BLD AUTO: 42.2 % (ref 35–47)
HGB BLD-MCNC: 13.9 G/DL (ref 11.7–15.7)
HGB UR QL STRIP: NEGATIVE
IMM GRANULOCYTES # BLD: 0 10E3/UL
IMM GRANULOCYTES NFR BLD: 0 %
KETONES UR STRIP-MCNC: NEGATIVE MG/DL
LEUKOCYTE ESTERASE UR QL STRIP: NEGATIVE
LYMPHOCYTES # BLD AUTO: 1.1 10E3/UL (ref 0.8–5.3)
LYMPHOCYTES NFR BLD AUTO: 18 %
Lab: NORMAL
MCH RBC QN AUTO: 33.7 PG (ref 26.5–33)
MCHC RBC AUTO-ENTMCNC: 32.9 G/DL (ref 31.5–36.5)
MCV RBC AUTO: 102 FL (ref 78–100)
MONOCYTES # BLD AUTO: 0.4 10E3/UL (ref 0–1.3)
MONOCYTES NFR BLD AUTO: 7 %
NEUTROPHILS # BLD AUTO: 4.4 10E3/UL (ref 1.6–8.3)
NEUTROPHILS NFR BLD AUTO: 73 %
NITRATE UR QL: NEGATIVE
NRBC # BLD AUTO: 0 10E3/UL
NRBC BLD AUTO-RTO: 0 /100
PERFORMING LABORATORY: NORMAL
PH UR STRIP: 6 [PH] (ref 5–7)
PLATELET # BLD AUTO: 212 10E3/UL (ref 150–450)
POTASSIUM BLD-SCNC: 4.4 MMOL/L (ref 3.4–5.3)
PROT SERPL-MCNC: 7.9 G/DL (ref 6.8–8.8)
PROT UR-MCNC: 0.09 G/L
PROT/CREAT 24H UR: 0.3 G/G CR (ref 0–0.2)
RBC # BLD AUTO: 4.13 10E6/UL (ref 3.8–5.2)
RBC URINE: <1 /HPF
SODIUM SERPL-SCNC: 140 MMOL/L (ref 133–144)
SP GR UR STRIP: 1 (ref 1–1.03)
SQUAMOUS EPITHELIAL: <1 /HPF
TEST NAME: NORMAL
TSH SERPL DL<=0.005 MIU/L-ACNC: 1.15 MU/L (ref 0.4–4)
UROBILINOGEN UR STRIP-MCNC: NORMAL MG/DL
WBC # BLD AUTO: 6.1 10E3/UL (ref 4–11)
WBC URINE: <1 /HPF

## 2022-03-15 PROCEDURE — 99205 OFFICE O/P NEW HI 60 MIN: CPT | Performed by: INTERNAL MEDICINE

## 2022-03-15 PROCEDURE — 80050 GENERAL HEALTH PANEL: CPT | Performed by: PATHOLOGY

## 2022-03-15 PROCEDURE — G0463 HOSPITAL OUTPT CLINIC VISIT: HCPCS

## 2022-03-15 PROCEDURE — 36415 COLL VENOUS BLD VENIPUNCTURE: CPT | Performed by: PATHOLOGY

## 2022-03-15 PROCEDURE — 81001 URINALYSIS AUTO W/SCOPE: CPT | Performed by: PATHOLOGY

## 2022-03-15 PROCEDURE — 83516 IMMUNOASSAY NONANTIBODY: CPT | Performed by: PATHOLOGY

## 2022-03-15 PROCEDURE — 86140 C-REACTIVE PROTEIN: CPT | Performed by: PATHOLOGY

## 2022-03-15 PROCEDURE — 85652 RBC SED RATE AUTOMATED: CPT | Performed by: PATHOLOGY

## 2022-03-15 PROCEDURE — 86235 NUCLEAR ANTIGEN ANTIBODY: CPT | Mod: 90 | Performed by: PATHOLOGY

## 2022-03-15 PROCEDURE — 99000 SPECIMEN HANDLING OFFICE-LAB: CPT | Performed by: PATHOLOGY

## 2022-03-15 PROCEDURE — 84156 ASSAY OF PROTEIN URINE: CPT | Performed by: PATHOLOGY

## 2022-03-15 PROCEDURE — 82550 ASSAY OF CK (CPK): CPT | Performed by: PATHOLOGY

## 2022-03-15 ASSESSMENT — PAIN SCALES - GENERAL: PAINLEVEL: MODERATE PAIN (4)

## 2022-03-15 NOTE — LETTER
3/15/2022       RE: Astrid Santoro  [address] [facility] [address]     Dear Colleague,    Thank you for referring your patient, Astrid Santoro, to the [facility]HEUMATOLOG[facility] [place] at [facility]. Please see a copy of my visit note below.    Rheumatology consultation note    CC: Joint pain for many years and Raynaud's    HPI: Astrid is 61 years old.  She has had joint pains for years and her symptoms are slowly getting worse.  She has had Raynaud's for many years and she has battled poor circulation in her fingertips for many years with amputation of several fingertips.  This was associated with nonhealing ulcers and cracked skin at the fingers.  She continues to smoke.    Calcifications. at the extensor surface of her left arm she had numerous calcium deposits surgically removed, with significant improvement but there are still a few calcifications present.  She does not have any calcifications elsewhere.  Raynaud's for many years, especially in the winter with white or blue fingers, digital ulcerations, pain and numbness and tingling in the fingers and hands.  Several fingertip amputations.  She denies any Esophageal dysmotility.  Sclerodactyly.  Over the years she has noticed more thickening of her fingers and also cracks in her skin of her hands.  Telangiectasias.  She has noticed red dots on her palms, face and elsewhere.    Past medical history, she was diagnosed with COPD and she had two CT scans to evaluate for pulmonary nodules.  Her most recent scan had new nodules that were not seen on the previous one.    Social history, she is a current smoker, social alcohol intake, she works full-time at [employer].    Family history, there is no autoimmune disease in the family.    ROS: She does have Raynaud's obviously, no sicca symptoms.  She denies any chest pain or shortness of breath, but she does notice more dyspnea on exertion as compared to  several years ago.  No abdominal pain, diarrhea, constipation.  No dysphagia.  Food does not get stuck.  She has a partial denture that she does not wear therefore she prefers soft foods.  No fevers, no skin rash.    Physical examination  Review of her vital signs are all normal and her BMI is 24  HEENT exam is normal but she does have several telangiectasias present on her face.  Mouth aperture is normal.  There is no cervical lymphadenopathy  Lungs are clear to auscultation  Heart regular rate and rhythm no murmur normal S1-S2  Abdomen is nontender  Joint examination shows that she has hands that are suggestive of  hands with multiple cracks in the fingertips.  Currently no digital ulcerations.  She has sclerodactyly.  Several digits are shorter due to fingertip amputations.  No Raynaud's during this exam.  Her hands display several telangiectasias.  She is able to make a full fist and there is no active synovitis in the MCPs wrists or elbows.  Shoulder exam is also normal.  Lower extremity exam reveals that she has difficulty doing a 90 degree straight leg raise.  Knees ankles and feet no active synovitis but she does have crepitation in both knees right more than left.  Cervical examination is normal lumbar spine shows normal forward flexion without any SI joint tenderness.  Gait is normal.  There is no evidence of tight skin on the dorsum of her hands, forearms face chest or lower extremities.    I reviewed her laboratory test and she has a positive EUNICE 1:1280 speckled and nucleolar pattern.    Impression/plan  1.  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    2.  She has pulmonary lesions bilaterally.  She has been smoking for many years and work-up of these pulmonary lesions is in progress.   3.  I have ordered a echocardiogram for a baseline and to check for pulmonary artery hypertension, which is associated with CREST.  4. I advised her to quit smoking  ASAP.  5. Overlap syndrome is another consideration, as her hands look like 's hands, prompting the consideration of a synthetase syndrome, which is usually associated with an inflammatory myopathy. Her CK was normal however.  6. We will proceed with further antibody work-up and I will get back to her with results.  6.  Follow-up in 3-6 months with one of my colleagues as I will be on leave.    I spent 60 minutes face to face with the patient, with 35 minutes on counseling and coordination of care.      Sincerely,    Arturo Anderson MD

## 2022-03-15 NOTE — NURSING NOTE
"Chief Complaint   Patient presents with     Consult     Positive EUNICE     Vital signs:  Temp: 98.1  F (36.7  C) Temp src: Oral BP: 127/70 Pulse: 83   Resp: 16 SpO2: 97 %     Height: 157.5 cm (5' 2.01\") Weight: 61.8 kg (136 lb 4.8 oz)  Estimated body mass index is 24.92 kg/m  as calculated from the following:    Height as of this encounter: 1.575 m (5' 2.01\").    Weight as of this encounter: 61.8 kg (136 lb 4.8 oz).        Yanet Randall, [facility]  3/15/2022 7:41 AM      "

## 2022-03-15 NOTE — LETTER
3/15/2022      RE: Astrid Santoro  [address] [facility] [address]       Rheumatology consultation note    CC: Joint pain for many years and Raynaud's    HPI: Astrid is 61 years old.  She has had joint pains for years and her symptoms are slowly getting worse.  She has had Raynaud's for many years and she has battled poor circulation in her fingertips for many years with amputation of several fingertips.  This was associated with nonhealing ulcers and cracked skin at the fingers.  She continues to smoke.    Calcifications. at the extensor surface of her left arm she had numerous calcium deposits surgically removed, with significant improvement but there are still a few calcifications present.  She does not have any calcifications elsewhere.  Raynaud's for many years, especially in the winter with white or blue fingers, digital ulcerations, pain and numbness and tingling in the fingers and hands.  Several fingertip amputations.  She denies any Esophageal dysmotility.  Sclerodactyly.  Over the years she has noticed more thickening of her fingers and also cracks in her skin of her hands.  Telangiectasias.  She has noticed red dots on her palms, face and elsewhere.    Past medical history, she was diagnosed with COPD and she had two CT scans to evaluate for pulmonary nodules.  Her most recent scan had new nodules that were not seen on the previous one.    Social history, she is a current smoker, social alcohol intake, she works full-time at [employer].    Family history, there is no autoimmune disease in the family.    ROS: She does have Raynaud's obviously, no sicca symptoms.  She denies any chest pain or shortness of breath, but she does notice more dyspnea on exertion as compared to several years ago.  No abdominal pain, diarrhea, constipation.  No dysphagia.  Food does not get stuck.  She has a partial denture that she does not wear therefore she prefers soft foods.  No fevers, no skin rash.    Physical  examination  Review of her vital signs are all normal and her BMI is 24  HEENT exam is normal but she does have several telangiectasias present on her face.  Mouth aperture is normal.  There is no cervical lymphadenopathy  Lungs are clear to auscultation  Heart regular rate and rhythm no murmur normal S1-S2  Abdomen is nontender  Joint examination shows that she has hands that are suggestive of  hands with multiple cracks in the fingertips.  Currently no digital ulcerations.  She has sclerodactyly.  Several digits are shorter due to fingertip amputations.  No Raynaud's during this exam.  Her hands display several telangiectasias.  She is able to make a full fist and there is no active synovitis in the MCPs wrists or elbows.  Shoulder exam is also normal.  Lower extremity exam reveals that she has difficulty doing a 90 degree straight leg raise.  Knees ankles and feet no active synovitis but she does have crepitation in both knees right more than left.  Cervical examination is normal lumbar spine shows normal forward flexion without any SI joint tenderness.  Gait is normal.  There is no evidence of tight skin on the dorsum of her hands, forearms face chest or lower extremities.    I reviewed her laboratory test and she has a positive EUNICE 1:1280 speckled and nucleolar pattern.    Impression/plan  1.  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    2.  She has pulmonary lesions bilaterally.  She has been smoking for many years and work-up of these pulmonary lesions is in progress.   3.  I have ordered a echocardiogram for a baseline and to check for pulmonary artery hypertension, which is associated with CREST.  4. I advised her to quit smoking ASAP.  5. Overlap syndrome is another consideration, as her hands look like 's hands, prompting the consideration of a synthetase syndrome, which is usually associated with an inflammatory myopathy. Her CK was normal  however.  6. We will proceed with further antibody work-up and I will get back to her with results.  6.  Follow-up in 3-6 months with one of my colleagues as I will be on leave.    I spent 60 minutes face to face with the patient, with 35 minutes on counseling and coordination of care.      Arturo Anderson MD

## 2022-03-15 NOTE — PROGRESS NOTES
Rheumatology consultation note    CC: Joint pain for many years and Raynaud's    HPI: Astrid is 61 years old.  She has had joint pains for years and her symptoms are slowly getting worse.  She has had Raynaud's for many years and she has battled poor circulation in her fingertips for many years with amputation of several fingertips.  This was associated with nonhealing ulcers and cracked skin at the fingers.  She continues to smoke.    Calcifications. at the extensor surface of her left arm she had numerous calcium deposits surgically removed, with significant improvement but there are still a few calcifications present.  She does not have any calcifications elsewhere.  Raynaud's for many years, especially in the winter with white or blue fingers, digital ulcerations, pain and numbness and tingling in the fingers and hands.  Several fingertip amputations.  She denies any Esophageal dysmotility.  Sclerodactyly.  Over the years she has noticed more thickening of her fingers and also cracks in her skin of her hands.  Telangiectasias.  She has noticed red dots on her palms, face and elsewhere.    Past medical history, she was diagnosed with COPD and she had two CT scans to evaluate for pulmonary nodules.  Her most recent scan had new nodules that were not seen on the previous one.    Social history, she is a current smoker, social alcohol intake, she works full-time at [employer].    Family history, there is no autoimmune disease in the family.    ROS: She does have Raynaud's obviously, no sicca symptoms.  She denies any chest pain or shortness of breath, but she does notice more dyspnea on exertion as compared to several years ago.  No abdominal pain, diarrhea, constipation.  No dysphagia.  Food does not get stuck.  She has a partial denture that she does not wear therefore she prefers soft foods.  No fevers, no skin rash.    Physical examination  Review of her vital signs are all normal and her BMI is 24  HEENT exam is  normal but she does have several telangiectasias present on her face.  Mouth aperture is normal.  There is no cervical lymphadenopathy  Lungs are clear to auscultation  Heart regular rate and rhythm no murmur normal S1-S2  Abdomen is nontender  Joint examination shows that she has hands that are suggestive of  hands with multiple cracks in the fingertips.  Currently no digital ulcerations.  She has sclerodactyly.  Several digits are shorter due to fingertip amputations.  No Raynaud's during this exam.  Her hands display several telangiectasias.  She is able to make a full fist and there is no active synovitis in the MCPs wrists or elbows.  Shoulder exam is also normal.  Lower extremity exam reveals that she has difficulty doing a 90 degree straight leg raise.  Knees ankles and feet no active synovitis but she does have crepitation in both knees right more than left.  Cervical examination is normal lumbar spine shows normal forward flexion without any SI joint tenderness.  Gait is normal.  There is no evidence of tight skin on the dorsum of her hands, forearms face chest or lower extremities.    I reviewed her laboratory test and she has a positive EUNICE 1:1280 speckled and nucleolar pattern.    Impression/plan  1.  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    2.  She has pulmonary lesions bilaterally.  She has been smoking for many years and work-up of these pulmonary lesions is in progress.   3.  I have ordered a echocardiogram for a baseline and to check for pulmonary artery hypertension, which is associated with CREST.  4. I advised her to quit smoking ASAP.  5. Overlap syndrome is another consideration, as her hands look like 's hands, prompting the consideration of a synthetase syndrome, which is usually associated with an inflammatory myopathy. Her CK was normal however.  6. We will proceed with further antibody work-up and I will get back to her with  results.  6.  Follow-up in 3-6 months with one of my colleagues as I will be on leave.    I spent 60 minutes face to face with the patient, with 35 minutes on counseling and coordination of care.

## 2022-03-15 NOTE — TELEPHONE ENCOUNTER
NOTES Status Details   OFFICE NOTE from referring provider Internal 12.16.2021 Nohelia Darby APRN CNP   MEDICATION LIST Internal    LABS (Any and all labs)      Internal

## 2022-03-16 LAB
ENA SM IGG SER IA-ACNC: 1.7 U/ML
ENA SM IGG SER IA-ACNC: NEGATIVE

## 2022-03-17 LAB — MISCELLANEOUS TEST 1 (ARUP): NORMAL

## 2022-03-18 LAB
CENTROMERE B AB SER-ACNC: <0.7 U/ML
CENTROMERE B AB SER-ACNC: NEGATIVE
ENA JO1 AB SER IA-ACNC: <0.5 U/ML
ENA JO1 IGG SER-ACNC: NEGATIVE
ENA SCL70 IGG SER IA-ACNC: <0.6 U/ML
ENA SCL70 IGG SER IA-ACNC: NEGATIVE

## 2022-03-24 ENCOUNTER — HOSPITAL ENCOUNTER (OUTPATIENT)
Dept: PET IMAGING | Facility: CLINIC | Age: 62
Discharge: HOME | End: 2022-03-24
Attending: INTERNAL MEDICINE | Admitting: INTERNAL MEDICINE
Payer: COMMERCIAL

## 2022-03-24 DIAGNOSIS — R91.8 LUNG NODULES: ICD-10-CM

## 2022-03-24 PROCEDURE — 78815 PET IMAGE W/CT SKULL-THIGH: CPT | Mod: 26

## 2022-03-24 PROCEDURE — A9552 F18 FDG: HCPCS | Performed by: INTERNAL MEDICINE

## 2022-03-24 PROCEDURE — 343N000001 HC RX 343: Performed by: INTERNAL MEDICINE

## 2022-03-24 PROCEDURE — 78815 PET IMAGE W/CT SKULL-THIGH: CPT | Mod: PI

## 2022-03-24 RX ADMIN — FLUDEOXYGLUCOSE F-18 11.6 MCI.: 500 INJECTION, SOLUTION INTRAVENOUS at 09:54

## 2022-03-30 ENCOUNTER — VIRTUAL VISIT (OUTPATIENT)
Dept: PULMONOLOGY | Facility: CLINIC | Age: 62
End: 2022-03-30
Attending: INTERNAL MEDICINE
Payer: COMMERCIAL

## 2022-03-30 DIAGNOSIS — F17.210 CIGARETTE NICOTINE DEPENDENCE, UNCOMPLICATED: ICD-10-CM

## 2022-03-30 DIAGNOSIS — J43.8 OTHER EMPHYSEMA (H): ICD-10-CM

## 2022-03-30 DIAGNOSIS — R91.8 LUNG NODULES: Primary | ICD-10-CM

## 2022-03-30 PROCEDURE — 99214 OFFICE O/P EST MOD 30 MIN: CPT | Mod: 95 | Performed by: INTERNAL MEDICINE

## 2022-03-30 NOTE — TELEPHONE ENCOUNTER
Pulmonary Nodule Conference      Patient Name: Astrid Santoro    Reason for conference discussion (brief overview):    61-year-old woman, current smoker, known previous granulomatous disease with 2 stable calcified nodules.    10 months after previous negative screening CT she had multiple new round nodules including cavitary 1 right apex and I believe 1 in bilateral superior segments.       Because of the round shape and relatively fast onset iron a PET scan as I thought they could be metastases.  PET scan was negative for distant lesions without significant activity in any of the nodules.     No symptoms.  Multiple significant sinus/chest cold back in December.    Specific Question:  3 month CT follow up?    Pertinent Histology:  n/a    Referring Physician: Inocencio Rivers MD    The patient's case was presented at the multidisciplinary conference for the above noted reason.  There was a consensus recommendation for the following actions:     6 month surveillance CT scan.          Case Lead:  Aline Rivas RN    Interventional Radiology Staff Present: Vaughn Ordoñez MD

## 2022-03-30 NOTE — LETTER
3/30/2022     RE: Astrid Santoro  [address] [facility] [address]     Dear Colleague,    Thank you for referring your patient, Astrid Santoro, to the [facility] at . Please see a copy of my visit note below.    Astrid is a 61 year old who is being evaluated via a billable video visit.      How would you like to obtain your AVS? MyChart  If the video visit is dropped, the invitation should be resent by: Text to cell phone: [phone]  Will anyone else be joining your video visit? No      Video Start Time: 145  Video-Visit Details    Type of service:  Video Visit    Video End Time:155    Originating Location (pt. Location): Home    Distant Location (provider location):  [facility]     Platform used for Video Visit: Yousuf Henry      LUNG NODULE & INTERVENTIONAL PULMONARY CLINIC  CL[facility]ICS & SURGERY [facility], [facility]     Astrid Santoro MRN# [id number]   Age: 61 year old YOB: 1960       Requesting Physician: Bradley Brady MD  No address on file       Assessment and Plan:    1.  Persistent lung nodules, negative PET.  Consider 3 month CT and discuss at nodule conference.    2.  COPD: some sinus, possible exacerbation symptoms a few months ago but none recently.  Continue current inhalers.  She should stop smoking.                 History:     Astrid Santoro is a 61 year old female with sig h/o for significant tobacco use, currently smoking here for follow-up of lung nodules.  She recently had a PET scan that showed no abnormal levels of activity knees nodules.  She reports having a sinus for chest cold type episode that was pretty significant earlier this year but none recently.  She is feeling fine currently.      - My interpretation of the images relevant for this visit includes: Multiple stable bilateral nodules, greatest 14 mm,  some cavitation.  Not PET avid             Past Medical History:      Past Medical History:   Diagnosis Date     Abnormal Pap smear of cervix     2017     Cervical high risk HPV (human papillomavirus) test positive 10/04/2017    12/15/20, 12/14/21     COPD (chronic obstructive pulmonary disease) (H)      Forearm mass      Human papillomavirus in conditions classified elsewhere and of unspecified site      Lupus erythematosus      Other, mixed, or unspecified nondependent drug abuse, unspecified     Meth, Marijuana. Currently smoking marijuana.     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     RESECT TUMOR UPPER EXTREMITY Left 3/19/2021    Procedure: removal tumor left forearm;  Surgeon: John Rahman MD;  Location: [facility] OR     SURGICAL HISTORY OF -   01/30/98, 09/03/97    Loop Electrosurgical Excision Procedure (LEEP)      TUBAL LIGATION  1989          Social History:     Social History     Tobacco Use     Smoking status: Current Every Day Smoker     Packs/day: 0.50     Years: 36.00     Pack years: 18.00     Types: Cigarettes     Smokeless tobacco: Never Used     Tobacco comment: cutting down,    Substance Use Topics     Alcohol use: Yes     Comment: 1-2 drinks per day ( 2 bottles)          Family History:     Family History   Problem Relation Age of Onset     Heart Disease Father      Hypertension Father      Alcohol/Drug Father      Diabetes Maternal Grandmother      Family History Negative Mother            Allergies:      Allergies   Allergen Reactions     Shrimp Nausea and Vomiting     Metronidazole Rash          Medications:     Current Outpatient Medications   Medication Sig     albuterol (PROAIR HFA/PROVENTIL HFA/VENTOLIN HFA) 108 (90 Base) MCG/ACT inhaler INHALE 2 PUFFS BY MOUTH EVERY  6 HOURS AS NEEDED     BREO ELLIPTA 200-25 MCG/INH Inhaler Inhale 1 puff by mouth once daily     ibuprofen (ADVIL/MOTRIN) 200 MG capsule Take 400 mg by mouth every 8 hours as needed      multivitamin w/minerals (MULTI-VITAMIN) tablet Take 1 tablet by mouth every morning     omega 3 1000 MG CAPS Take by mouth every morning     triamcinolone (KENALOG) 0.5 % external ointment Apply 1 g topically 2 times daily (Patient taking differently: Apply 1 g topically 2 times daily as needed )     TURMERIC PO Take by mouth every morning     TYLENOL EXTRA STRENGTH OR Take 1,000 mg by mouth 3 times daily as needed      valACYclovir (VALTREX) 500 MG tablet Take 1 tablet (500 mg) by mouth 2 times daily (Patient not taking: Reported on 3/15/2022)     No current facility-administered medications for this visit.          Review of Systems:     See HPI         Physical Exam:   LMP 02/27/2013     Constitutional - looks well, in no apparent distress  Eyes - no redness or discharge  Respiratory -breathing appears comfortable. No wheeze or rhonchi.   Cardiac -- Normal rate, rhythm.   Skin - No appreciable discoloration or lesions (very limited exam)  Neurological - No apparent tremors. Speech fluent and articlate  Psychiatric - no signs of delirium or anxiety          Current Laboratory Data:   All laboratory and imaging data reviewed.    No results found for this or any previous visit (from the past 24 hour(s)).     Again, thank you for allowing me to participate in the care of your patient.      Sincerely,    Inocencio Rivers MD

## 2022-03-30 NOTE — PROGRESS NOTES
Astrid is a 61 year old who is being evaluated via a billable video visit.      How would you like to obtain your AVS? MyChart  If the video visit is dropped, the invitation should be resent by: Text to cell phone: [phone]  Will anyone else be joining your video visit? No      Video Start Time: 145  Video-Visit Details    Type of service:  Video Visit    Video End Time:155    Originating Location (pt. Location): Home    Distant Location (provider location):  [facility]     Platform used for Video Visit: Yousuf Henry      LUNG NODULE & INTERVENTIONAL PULMONARY CLINIC  CL[facility]ICS & SUR[facility]ERY [facility], [facility]     Astrid Santoro MRN# [id number]   Age: 61 year old YOB: 1960       Requesting Physician: Bradley Brady MD  No address on file       Assessment and Plan:    1.  Persistent lung nodules, negative PET.  Consider 3 month CT and discuss at nodule conference.    2.  COPD: some sinus, possible exacerbation symptoms a few months ago but none recently.  Continue current inhalers.  She should stop smoking.                 History:     Astrid Santoro is a 61 year old female with sig h/o for significant tobacco use, currently smoking here for follow-up of lung nodules.  She recently had a PET scan that showed no abnormal levels of activity knees nodules.  She reports having a sinus for chest cold type episode that was pretty significant earlier this year but none recently.  She is feeling fine currently.      - My interpretation of the images relevant for this visit includes: Multiple stable bilateral nodules, greatest 14 mm, some cavitation.  Not PET avid             Past Medical History:      Past Medical History:   Diagnosis Date     Abnormal Pap smear of cervix     2017     Cervical high risk HPV (human papillomavirus) test positive 10/04/2017    12/15/20, 12/14/21     COPD (chronic obstructive pulmonary disease)  (H)      Forearm mass      Human papillomavirus in conditions classified elsewhere and of unspecified site      Lupus erythematosus      Other, mixed, or unspecified nondependent drug abuse, unspecified     Meth, Marijuana. Currently smoking marijuana.     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     RESECT TUMOR UPPER EXTREMITY Left 3/19/2021    Procedure: removal tumor left forearm;  Surgeon: John Rahman MD;  Location: [facility] OR     SURGICAL HISTORY OF -   01/30/98, 09/03/97    Loop Electrosurgical Excision Procedure (LEEP)      TUBAL LIGATION  1989          Social History:     Social History     Tobacco Use     Smoking status: Current Every Day Smoker     Packs/day: 0.50     Years: 36.00     Pack years: 18.00     Types: Cigarettes     Smokeless tobacco: Never Used     Tobacco comment: cutting down,    Substance Use Topics     Alcohol use: Yes     Comment: 1-2 drinks per day ( 2 bottles)          Family History:     Family History   Problem Relation Age of Onset     Heart Disease Father      Hypertension Father      Alcohol/Drug Father      Diabetes Maternal Grandmother      Family History Negative Mother            Allergies:      Allergies   Allergen Reactions     Shrimp Nausea and Vomiting     Metronidazole Rash          Medications:     Current Outpatient Medications   Medication Sig     albuterol (PROAIR HFA/PROVENTIL HFA/VENTOLIN HFA) 108 (90 Base) MCG/ACT inhaler INHALE 2 PUFFS BY MOUTH EVERY 6 HOURS AS NEEDED     BREO ELLIPTA 200-25 MCG/INH Inhaler Inhale 1 puff by mouth once daily     ibuprofen (ADVIL/MOTRIN) 200 MG capsule Take 400 mg by mouth every 8 hours as needed      multivitamin w/minerals (MULTI-VITAMIN) tablet Take 1 tablet by mouth every morning     omega 3 1000 MG CAPS  Take by mouth every morning     triamcinolone (KENALOG) 0.5 % external ointment Apply 1 g topically 2 times daily (Patient taking differently: Apply 1 g topically 2 times daily as needed )     TURMERIC PO Take by mouth every morning     TYLENOL EXTRA STRENGTH OR Take 1,000 mg by mouth 3 times daily as needed      valACYclovir (VALTREX) 500 MG tablet Take 1 tablet (500 mg) by mouth 2 times daily (Patient not taking: Reported on 3/15/2022)     No current facility-administered medications for this visit.          Review of Systems:     See HPI         Physical Exam:   LMP 02/27/2013     Constitutional - looks well, in no apparent distress  Eyes - no redness or discharge  Respiratory -breathing appears comfortable. No wheeze or rhonchi.   Cardiac -- Normal rate, rhythm.   Skin - No appreciable discoloration or lesions (very limited exam)  Neurological - No apparent tremors. Speech fluent and articlate  Psychiatric - no signs of delirium or anxiety          Current Laboratory Data:   All laboratory and imaging data reviewed.    No results found for this or any previous visit (from the past 24 hour(s)).

## 2022-04-01 ENCOUNTER — TEAM CONFERENCE (OUTPATIENT)
Dept: PULMONOLOGY | Facility: CLINIC | Age: 62
End: 2022-04-01
Payer: COMMERCIAL

## 2022-04-01 DIAGNOSIS — R91.8 LUNG NODULES: Primary | ICD-10-CM

## 2022-05-14 DIAGNOSIS — J44.9 CHRONIC OBSTRUCTIVE PULMONARY DISEASE, UNSPECIFIED COPD TYPE (H): ICD-10-CM

## 2022-07-07 DIAGNOSIS — J44.9 CHRONIC OBSTRUCTIVE PULMONARY DISEASE, UNSPECIFIED COPD TYPE (H): ICD-10-CM

## 2022-07-22 NOTE — TELEPHONE ENCOUNTER
FYI to provider - Patient is lost to pap tracking follow-up. Attempts to contact pt have been made per reminder process and there has been no reply and/or no appt scheduled. Contact hx listed below.     9/27/2017 ASCUS, +HR HPV (neg 16/18). Plan [place]  10/30/17 [place] Bx & ECC - negative. Plan cotest in 1 year.   12/6/18 Lost to follow-up for pap tracking  12/15/20 NIL, + HR HPV (not 16 or 18). Plan cotest in 1 year   12/14/21 NIL Pap, + HR HPV (neg 16/18). [place] due by 3/14/22.  12/21/21 Message left to return call. [facility] result note sent.  12/24/21 Pt viewed result on [employer].  2/14/22 Reminder [employer] -- read  3/9/22 [place] not done. Tracking updated for 6 mo colp/pap due 6/14/22.    5/24/22 Reminder [employer]  6/23/22 Reminder call -- left message  7/22/22 Lost to follow-up for pap tracking       Maria T Mendez RN BSN, Pap Tracking

## 2022-08-04 ENCOUNTER — MYC REFILL (OUTPATIENT)
Dept: FAMILY MEDICINE | Facility: CLINIC | Age: 62
End: 2022-08-04

## 2022-08-04 DIAGNOSIS — J44.9 CHRONIC OBSTRUCTIVE PULMONARY DISEASE, UNSPECIFIED COPD TYPE (H): ICD-10-CM

## 2022-10-03 ENCOUNTER — HOSPITAL ENCOUNTER (OUTPATIENT)
Dept: CT IMAGING | Facility: CLINIC | Age: 62
Discharge: HOME | End: 2022-10-03
Attending: INTERNAL MEDICINE | Admitting: INTERNAL MEDICINE
Payer: COMMERCIAL

## 2022-10-03 DIAGNOSIS — R91.8 LUNG NODULES: ICD-10-CM

## 2022-10-03 PROCEDURE — 71250 CT THORAX DX C-: CPT

## 2022-11-09 ENCOUNTER — VIRTUAL VISIT (OUTPATIENT)
Dept: PULMONOLOGY | Facility: CLINIC | Age: 62
End: 2022-11-09
Attending: INTERNAL MEDICINE
Payer: COMMERCIAL

## 2022-11-09 DIAGNOSIS — J43.8 OTHER EMPHYSEMA (H): ICD-10-CM

## 2022-11-09 DIAGNOSIS — R91.8 LUNG NODULES: Primary | ICD-10-CM

## 2022-11-09 PROCEDURE — G0463 HOSPITAL OUTPT CLINIC VISIT: HCPCS | Mod: PN,RTG | Performed by: INTERNAL MEDICINE

## 2022-11-09 PROCEDURE — 99214 OFFICE O/P EST MOD 30 MIN: CPT | Mod: 95 | Performed by: INTERNAL MEDICINE

## 2022-11-09 NOTE — NURSING NOTE
Patient denies any changes since check-in regarding medication and allergies and states all information entered during check-in remains accurate.    Sam Singh, Visit Facilitator/MA.

## 2022-11-09 NOTE — LETTER
11/9/2022       RE: Astrid Santoro  [address] [address]     Dear Colleague,    Thank you for referring your patient, Astrid Santoro, to the [facility] at [facility]. Please see a copy of my visit note below.    Astrid is a 62 year old who is being evaluated via a billable video visit.      How would you like to obtain your AVS? MyChart  If the video visit is dropped, the invitation should be resent by: Text to cell phone: [phone]  Will anyone else be joining your video visit? No      Sam Singh, Visit Facilitator/MA.    Video-Visit Details    Video Start Time: 1525    Type of service:  Video Visit    Video End Time:1530    Originating Location (pt. Location): Home        Distant Location (provider location):  On-site    Platform used for Video Visit: Yousuf Resendiz is a 61 year old who is being evaluated via a billable video visit.      How would you like to obtain your AVS? MyChart  If the video visit is dropped, the invitation should be resent by: Text to cell phone: [phone]  Will anyone else be joining your video visit? No      Video Start Time: 145  Video-Visit Details    Type of service:  Video Visit    Video End Time:155    Originating Location (pt. Location): Home    Distant Location (provider location):  [facility] CANCER [facility]     Platform used for Video Visit: Yousuf Henry      LUNG NODULE & INTERVENTIONAL PULMONARY CLINIC  CLINICS & SURGERY CENTER, [facility]     Astrid Santoro MRN# [id number]   Age: 61 year old YOB: 1960       Requesting Physician: Referred MD Jose Antonio  No address on file       Assessment and Plan:    1.  Persistent lung nodules, negative PET.  Most likely scarring or inflammation. One nodule of many increased slightly.  She prefers longer interval follow up. Plan for 1 year CT.    2.  COPD: No  recent exacerbations.  Continue current inhalers.  She should stop smoking.                 History:     Astrid Santoro is a 62 year old female with sig h/o for significant tobacco use  here for follow-up of lung nodules. She is feeling well without any current pulmonary symptoms.      - My interpretation of the images relevant for this visit includes: Multiple stable bilateral nodules, greatest 14 mm, one SHARITA increased in size slightly.             Past Medical History:      Past Medical History:   Diagnosis Date     Abnormal Pap smear of cervix     2017     Cervical high risk HPV (human papillomavirus) test positive 10/04/2017    12/15/20, 12/14/21     COPD (chronic obstructive pulmonary disease) (H)      Forearm mass      Human papillomavirus in conditions classified elsewhere and of unspecified site      Lupus erythematosus      Other, mixed, or unspecified nondependent drug abuse, unspecified     Meth, Marijuana. Currently smoking marijuana.     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     RESECT TUMOR UPPER EXTREMITY Left 3/19/2021    Procedure: removal tumor left forearm;  Surgeon: John Rahman MD;  Location: [facility]     SURGICAL HISTORY OF -   01/30/98, 09/03/97    Loop Electrosurgical Excision Procedure (LEEP)      TUBAL LIGATION  1989          Social History:     Social History     Tobacco Use     Smoking status: Every Day     Packs/day: 0.50     Years: 36.00     Pack years: 18.00     Types: Cigarettes     Smokeless tobacco: Never     Tobacco comments:     cutting down,    Substance Use Topics     Alcohol use: Yes     Comment: 1-2 drinks per day ( 2 bottles)          Family History:     Family History   Problem Relation Age of Onset     Heart Disease Father      Hypertension Father       Alcohol/Drug Father      Diabetes Maternal Grandmother      Family History Negative Mother            Allergies:      Allergies   Allergen Reactions     Shrimp Nausea and Vomiting     Metronidazole Rash          Medications:     Current Outpatient Medications   Medication Sig     albuterol (PROAIR HFA/PROVENTIL HFA/VENTOLIN HFA) 108 (90 Base) MCG/ACT inhaler INHALE 2 PUFFS BY MOUTH EVERY 6 HOURS AS NEEDED     fluticasone-vilanterol (BREO ELLIPTA) 200-25 MCG/INH inhaler INHALE 1 PUFF ONCE DAILY     ibuprofen (ADVIL/MOTRIN) 200 MG capsule Take 400 mg by mouth every 8 hours as needed      multivitamin w/minerals (THERA-VIT-M) tablet Take 1 tablet by mouth every morning     omega 3 1000 MG CAPS Take by mouth every morning     triamcinolone (KENALOG) 0.5 % external ointment Apply 1 g topically 2 times daily (Patient taking differently: Apply 1 g topically 2 times daily as needed)     TURMERIC PO Take by mouth every morning     TYLENOL EXTRA STRENGTH OR Take 1,000 mg by mouth 3 times daily as needed      valACYclovir (VALTREX) 500 MG tablet Take 1 tablet (500 mg) by mouth 2 times daily (Patient not taking: Reported on 3/15/2022)     No current facility-administered medications for this visit.          Review of Systems:     See HPI         Physical Exam:   LMP 02/27/2013     Constitutional - looks well, in no apparent distress  Eyes - no redness or discharge  Respiratory -breathing appears comfortable. No wheeze or rhonchi.   Skin - No appreciable discoloration or lesions (very limited exam)  Neurological - No apparent tremors. Speech fluent and articlate  Psychiatric - no signs of delirium or anxiety          Current Laboratory Data:   All laboratory and imaging data reviewed.    No results found for this or any previous visit (from the past 24 hour(s)).           Again, thank you for allowing me to participate in the care of your patient.      Sincerely,    Inocencio Rivers MD

## 2022-11-09 NOTE — PROGRESS NOTES
Astrid is a 62 year old who is being evaluated via a billable video visit.      How would you like to obtain your AVS? MyChart  If the video visit is dropped, the invitation should be resent by: Text to cell phone: [phone]  Will anyone else be joining your video visit? No      Steffi Patel/MA.    Video-Visit Details    Video Start Time: 1525    Type of service:  Video Visit    Video End Time:1530    Originating Location (pt. Location): Home        Distant Location (provider location):  On-site    Platform used for Video Visit: Yousuf Resendiz is a 61 year old who is being evaluated via a billable video visit.      How would you like to obtain your AVS? MyChart  If the video visit is dropped, the invitation should be resent by: Text to cell phone: [phone]  Will anyone else be joining your video visit? No      Video Start Time: 145  Video-Visit Details    Type of service:  Video Visit    Video End Time:155    Originating Location (pt. Location): Home    Distant Location (provider location):  [facility] CANCER [facility]     Platform used for Video Visit: Yousuf Henry      LUNG NODULE & INTERVENTIONAL PULMONARY CLINIC  CLINICS & SURGERY CENTER, [facility]     Atsrid Santoro MRN# [id number]   Age: 61 year old YOB: 1960       Requesting Physician: Bradley Brady MD  No address on file       Assessment and Plan:    1.  Persistent lung nodules, negative PET.  Most likely scarring or inflammation. One nodule of many increased slightly.  She prefers longer interval follow up. Plan for 1 year CT.    2.  COPD: No recent exacerbations.  Continue current inhalers.  She should stop smoking.                 History:     Astrid Santoro is a 62 year old female with sig h/o for significant tobacco use  here for follow-up of lung nodules. She is feeling well without any current pulmonary symptoms.      - My  interpretation of the images relevant for this visit includes: Multiple stable bilateral nodules, greatest 14 mm, one SHARITA increased in size slightly.             Past Medical History:      Past Medical History:   Diagnosis Date     Abnormal Pap smear of cervix     2017     Cervical high risk HPV (human papillomavirus) test positive 10/04/2017    12/15/20, 12/14/21     COPD (chronic obstructive pulmonary disease) (H)      Forearm mass      Human papillomavirus in conditions classified elsewhere and of unspecified site      Lupus erythematosus      Other, mixed, or unspecified nondependent drug abuse, unspecified     Meth, Marijuana. Currently smoking marijuana.     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     RESECT TUMOR UPPER EXTREMITY Left 3/19/2021    Procedure: removal tumor left forearm;  Surgeon: John Rahman MD;  Location: [facility] OR     SURGICAL HISTORY OF -   01/30/98, 09/03/97    Loop Electrosurgical Excision Procedure (LEEP)      TUBAL LIGATION  1989          Social History:     Social History     Tobacco Use     Smoking status: Every Day     Packs/day: 0.50     Years: 36.00     Pack years: 18.00     Types: Cigarettes     Smokeless tobacco: Never     Tobacco comments:     cutting down,    Substance Use Topics     Alcohol use: Yes     Comment: 1-2 drinks per day ( 2 bottles)          Family History:     Family History   Problem Relation Age of Onset     Heart Disease Father      Hypertension Father      Alcohol/Drug Father      Diabetes Maternal Grandmother      Family History Negative Mother            Allergies:      Allergies   Allergen Reactions     Shrimp Nausea and Vomiting     Metronidazole Rash          Medications:     Current Outpatient Medications   Medication Sig      albuterol (PROAIR HFA/PROVENTIL HFA/VENTOLIN HFA) 108 (90 Base) MCG/ACT inhaler INHALE 2 PUFFS BY MOUTH EVERY 6 HOURS AS NEEDED     fluticasone-vilanterol (BREO ELLIPTA) 200-25 MCG/INH inhaler INHALE 1 PUFF ONCE DAILY     ibuprofen (ADVIL/MOTRIN) 200 MG capsule Take 400 mg by mouth every 8 hours as needed      multivitamin w/minerals (THERA-VIT-M) tablet Take 1 tablet by mouth every morning     omega 3 1000 MG CAPS Take by mouth every morning     triamcinolone (KENALOG) 0.5 % external ointment Apply 1 g topically 2 times daily (Patient taking differently: Apply 1 g topically 2 times daily as needed)     TURMERIC PO Take by mouth every morning     TYLENOL EXTRA STRENGTH OR Take 1,000 mg by mouth 3 times daily as needed      valACYclovir (VALTREX) 500 MG tablet Take 1 tablet (500 mg) by mouth 2 times daily (Patient not taking: Reported on 3/15/2022)     No current facility-administered medications for this visit.          Review of Systems:     See HPI         Physical Exam:   LMP 02/27/2013     Constitutional - looks well, in no apparent distress  Eyes - no redness or discharge  Respiratory -breathing appears comfortable. No wheeze or rhonchi.   Skin - No appreciable discoloration or lesions (very limited exam)  Neurological - No apparent tremors. Speech fluent and articlate  Psychiatric - no signs of delirium or anxiety          Current Laboratory Data:   All laboratory and imaging data reviewed.    No results found for this or any previous visit (from the past 24 hour(s)).

## 2022-11-19 ENCOUNTER — HEALTH MAINTENANCE LETTER (OUTPATIENT)
Age: 62
End: 2022-11-19

## 2022-11-30 ENCOUNTER — HOSPITAL ENCOUNTER (OUTPATIENT)
Dept: CARDIOLOGY | Facility: CLINIC | Age: 62
Discharge: HOME | End: 2022-11-30
Attending: INTERNAL MEDICINE | Admitting: INTERNAL MEDICINE
Payer: COMMERCIAL

## 2022-11-30 DIAGNOSIS — M34.1 CREST (CALCINOSIS, RAYNAUD'S PHENOMENON, ESOPHAGEAL DYSFUNCTION, SCLERODACTYLY, TELANGIECTASIA) (H): ICD-10-CM

## 2022-11-30 LAB — LVEF ECHO: NORMAL

## 2022-11-30 PROCEDURE — 93306 TTE W/DOPPLER COMPLETE: CPT | Mod: 26 | Performed by: INTERNAL MEDICINE

## 2022-11-30 PROCEDURE — 93306 TTE W/DOPPLER COMPLETE: CPT

## 2022-12-26 DIAGNOSIS — J44.9 CHRONIC OBSTRUCTIVE PULMONARY DISEASE, UNSPECIFIED COPD TYPE (H): ICD-10-CM

## 2022-12-26 RX ORDER — ALBUTEROL SULFATE 90 UG/1
AEROSOL, METERED RESPIRATORY (INHALATION)
Qty: 18 G | Refills: 0 | Status: SHIPPED | OUTPATIENT
Start: 2022-12-26 | End: 2023-01-09

## 2023-01-09 ENCOUNTER — MYC REFILL (OUTPATIENT)
Dept: FAMILY MEDICINE | Facility: CLINIC | Age: 63
End: 2023-01-09

## 2023-01-09 DIAGNOSIS — J44.9 CHRONIC OBSTRUCTIVE PULMONARY DISEASE, UNSPECIFIED COPD TYPE (H): ICD-10-CM

## 2023-01-10 RX ORDER — ALBUTEROL SULFATE 90 UG/1
2 AEROSOL, METERED RESPIRATORY (INHALATION) EVERY 6 HOURS PRN
Qty: 18 G | Refills: 0 | Status: SHIPPED | OUTPATIENT
Start: 2023-01-10

## 2023-01-10 RX ORDER — FLUTICASONE FUROATE AND VILANTEROL TRIFENATATE 200; 25 UG/1; UG/1
POWDER RESPIRATORY (INHALATION)
Qty: 60 EACH | Refills: 0 | Status: SHIPPED | OUTPATIENT
Start: 2023-01-10 | End: 2023-02-14

## 2023-02-07 DIAGNOSIS — J44.9 CHRONIC OBSTRUCTIVE PULMONARY DISEASE, UNSPECIFIED COPD TYPE (H): ICD-10-CM

## 2023-02-10 NOTE — TELEPHONE ENCOUNTER
Routing to provider for consideration, pt did not schedule appt with last reminder, my chart sent today requesting appt and explaining the why.     Stephanie Tadeo RN MSN

## 2023-02-12 NOTE — RESULT ENCOUNTER NOTE
[facility] Emergency Dept discharge antibiotic (if prescribed): None  No changes in treatment per [facility] ED Lab Result Urine culture protocol.   TTP RLQ > LLQ, +guarding, no rebound. +BS x 4

## 2023-02-14 RX ORDER — FLUTICASONE FUROATE AND VILANTEROL TRIFENATATE 200; 25 UG/1; UG/1
POWDER RESPIRATORY (INHALATION)
Qty: 84 EACH | Refills: 0 | Status: SHIPPED | OUTPATIENT
Start: 2023-02-14 | End: 2023-07-10

## 2023-04-09 ENCOUNTER — HEALTH MAINTENANCE LETTER (OUTPATIENT)
Age: 63
End: 2023-04-09

## 2023-07-07 DIAGNOSIS — J44.9 CHRONIC OBSTRUCTIVE PULMONARY DISEASE, UNSPECIFIED COPD TYPE (H): ICD-10-CM

## 2023-07-07 NOTE — TELEPHONE ENCOUNTER
Pending Prescriptions:                       Disp   Refills    BREO ELLIPTA 200-25 MCG/ACT inhaler [Pharm*       0        Sig: INHALE 1 PUFF BY MOUTH ONCE DAILY-DUE FOR YEARLY           CHECK UP WITH PRIMARY CARE PHYSICIAN    Routing refill request to provider for review/approval because:  Patient needs to be seen because it has been more than 1 year since last office visit.  Last seen 12/2021.    Christie Benitez RN

## 2023-07-10 RX ORDER — FLUTICASONE FUROATE AND VILANTEROL TRIFENATATE 200; 25 UG/1; UG/1
POWDER RESPIRATORY (INHALATION)
Qty: 1 EACH | Refills: 0 | Status: SHIPPED | OUTPATIENT
Start: 2023-07-10

## 2023-08-21 DIAGNOSIS — J44.9 CHRONIC OBSTRUCTIVE PULMONARY DISEASE, UNSPECIFIED COPD TYPE (H): ICD-10-CM

## 2023-08-22 RX ORDER — FLUTICASONE FUROATE AND VILANTEROL TRIFENATATE 200; 25 UG/1; UG/1
POWDER RESPIRATORY (INHALATION)
Refills: 0 | OUTPATIENT
Start: 2023-08-22

## 2023-08-22 NOTE — TELEPHONE ENCOUNTER
Per last fill notes appt needed please call to schedule and let the team know if consideration of a bridge fill to scheduled appt is needed.  Stephanie Tadeo MSN, RN

## 2024-01-15 NOTE — TELEPHONE ENCOUNTER
See 11-12-20  visit- abx changed to clindamycin due to allergic rx sx. Advised to start antihistamine.  Taking clindamycin as prescribed, initially took loratidine- switched to Allegra.  Reports sx have worsened- increased swelling, red rash hands/ fingers, face. Eye lids swollen in AM, better as day progresses.   Denies scratchy throat, swollen lip, difficulty breathing, swallowing. No oral lesions.   Has applied neosporin pain relief to hands, usual moisturizer to face.   Advised needs to be seen for eval- scheduled with Glenys guzman AM. Neg MALATHI Bergman RN     - - -

## 2024-04-07 ENCOUNTER — HEALTH MAINTENANCE LETTER (OUTPATIENT)
Age: 64
End: 2024-04-07

## 2024-06-16 ENCOUNTER — HEALTH MAINTENANCE LETTER (OUTPATIENT)
Age: 64
End: 2024-06-16

## (undated) DEVICE — SU PDS II 3-0 PS-2 18" Z497G

## (undated) DEVICE — SUCTION MANIFOLD NEPTUNE 2 SYS 1 PORT 702-025-000

## (undated) DEVICE — ESU PENCIL W/HOLSTER

## (undated) DEVICE — GLOVE PROTEXIS BLUE W/NEU-THERA 8.0  2D73EB80

## (undated) DEVICE — SU VICRYL 2-0 CT-1 27" UND J259H

## (undated) DEVICE — ESU GROUND PAD ADULT W/CORD E7507

## (undated) DEVICE — LINEN ORTHO PACK 5446

## (undated) DEVICE — GLOVE PROTEXIS W/NEU-THERA 7.5  2D73TE75

## (undated) DEVICE — GLOVE PROTEXIS POWDER FREE SMT 7.0  2D72PT70X

## (undated) DEVICE — SOL NACL 0.9% IRRIG 500ML BOTTLE 2F7123

## (undated) DEVICE — GLOVE PROTEXIS BLUE W/NEU-THERA 7.5  2D73EB75

## (undated) DEVICE — DRSG AQUACEL AG 3.5X6.0" HYDROFIBER 412010

## (undated) DEVICE — DRAPE STERI U 1015

## (undated) RX ORDER — CEFAZOLIN SODIUM 2 G/50ML
SOLUTION INTRAVENOUS
Status: DISPENSED
Start: 2021-03-19

## (undated) RX ORDER — GABAPENTIN 300 MG/1
CAPSULE ORAL
Status: DISPENSED
Start: 2021-03-19

## (undated) RX ORDER — FENTANYL CITRATE 50 UG/ML
INJECTION, SOLUTION INTRAMUSCULAR; INTRAVENOUS
Status: DISPENSED
Start: 2021-03-19

## (undated) RX ORDER — ACETAMINOPHEN 325 MG/1
TABLET ORAL
Status: DISPENSED
Start: 2021-03-19